# Patient Record
Sex: FEMALE | Race: WHITE | NOT HISPANIC OR LATINO | Employment: UNEMPLOYED | RURAL
[De-identification: names, ages, dates, MRNs, and addresses within clinical notes are randomized per-mention and may not be internally consistent; named-entity substitution may affect disease eponyms.]

---

## 2018-04-24 ENCOUNTER — HISTORICAL (OUTPATIENT)
Dept: ADMINISTRATIVE | Facility: HOSPITAL | Age: 48
End: 2018-04-24

## 2018-04-26 LAB
LAB AP GENERAL CAT - HISTORICAL: NORMAL
LAB AP INTERPRETATION/RESULT - HISTORICAL: NEGATIVE
LAB AP SPECIMEN ADEQUACY - HISTORICAL: NORMAL
LAB AP SPECIMEN SUBMITTED - HISTORICAL: NORMAL

## 2018-09-07 ENCOUNTER — HISTORICAL (OUTPATIENT)
Dept: ADMINISTRATIVE | Facility: HOSPITAL | Age: 48
End: 2018-09-07

## 2018-09-10 LAB
LAB AP CLINICAL INFORMATION: NORMAL
LAB AP COMMENTS: NORMAL
LAB AP DIAGNOSIS - HISTORICAL: NORMAL
LAB AP GROSS PATHOLOGY - HISTORICAL: NORMAL
LAB AP SPECIMEN SUBMITTED - HISTORICAL: NORMAL

## 2020-05-14 ENCOUNTER — HISTORICAL (OUTPATIENT)
Dept: ADMINISTRATIVE | Facility: HOSPITAL | Age: 50
End: 2020-05-14

## 2020-05-18 LAB
LAB AP COMMENTS: NORMAL
LAB AP GENERAL CAT - HISTORICAL: NORMAL
LAB AP INTERPRETATION/RESULT - HISTORICAL: NEGATIVE
LAB AP SPECIMEN ADEQUACY - HISTORICAL: NORMAL
LAB AP SPECIMEN SUBMITTED - HISTORICAL: NORMAL

## 2021-09-27 ENCOUNTER — HOSPITAL ENCOUNTER (OUTPATIENT)
Dept: RADIOLOGY | Facility: HOSPITAL | Age: 51
Discharge: HOME | End: 2021-09-27
Attending: ORTHOPAEDIC SURGERY
Payer: MEDICARE

## 2021-09-27 DIAGNOSIS — M25.562 PAIN IN BOTH KNEES, UNSPECIFIED CHRONICITY: ICD-10-CM

## 2021-09-27 DIAGNOSIS — M25.561 PAIN IN BOTH KNEES, UNSPECIFIED CHRONICITY: ICD-10-CM

## 2021-09-27 PROCEDURE — 73562 X-RAY EXAM OF KNEE 3: CPT | Mod: TC,50

## 2021-10-04 PROBLEM — M17.0 PRIMARY OSTEOARTHRITIS OF BOTH KNEES: Status: ACTIVE | Noted: 2021-10-04

## 2021-12-06 PROBLEM — M17.0 PRIMARY OSTEOARTHRITIS OF BOTH KNEES: Chronic | Status: ACTIVE | Noted: 2021-10-04

## 2021-12-06 RX ORDER — FLUCONAZOLE 150 MG/1
TABLET ORAL
Status: ON HOLD | COMMUNITY
Start: 2021-06-23 | End: 2021-12-07

## 2021-12-06 RX ORDER — DEXAMETHASONE 4 MG/1
TABLET ORAL
Status: ON HOLD | COMMUNITY
Start: 2021-07-02 | End: 2021-12-07

## 2021-12-06 RX ORDER — CLOTRIMAZOLE AND BETAMETHASONE DIPROPIONATE 10; .64 MG/G; MG/G
CREAM TOPICAL
COMMUNITY
Start: 2021-11-09

## 2021-12-06 RX ORDER — MUPIROCIN 20 MG/G
OINTMENT TOPICAL
Status: ON HOLD | COMMUNITY
Start: 2021-07-15 | End: 2021-12-07

## 2021-12-06 RX ORDER — IBUPROFEN 800 MG/1
800 TABLET ORAL EVERY 6 HOURS PRN
Status: ON HOLD | COMMUNITY
Start: 2021-11-30 | End: 2021-12-08 | Stop reason: HOSPADM

## 2021-12-06 RX ORDER — METRONIDAZOLE 500 MG/1
TABLET ORAL
Status: ON HOLD | COMMUNITY
Start: 2021-06-23 | End: 2021-12-07

## 2021-12-06 RX ORDER — CHOLECALCIFEROL (VITAMIN D3) 25 MCG
TABLET ORAL
Status: ON HOLD | COMMUNITY
Start: 2021-07-02 | End: 2021-12-07

## 2021-12-06 RX ORDER — ERGOCALCIFEROL 1.25 MG/1
50000 CAPSULE ORAL
COMMUNITY
Start: 2021-06-18

## 2021-12-06 RX ORDER — ESTRADIOL VALERATE 20 MG/ML
INJECTION INTRAMUSCULAR
COMMUNITY
Start: 2021-12-01

## 2021-12-06 RX ORDER — LANOLIN ALCOHOL/MO/W.PET/CERES
400 CREAM (GRAM) TOPICAL 2 TIMES DAILY
COMMUNITY
Start: 2021-12-02

## 2021-12-06 RX ORDER — PHENTERMINE HYDROCHLORIDE 37.5 MG/1
TABLET ORAL
COMMUNITY
Start: 2021-07-30

## 2021-12-06 RX ORDER — TRIAMCINOLONE ACETONIDE 5 MG/G
CREAM TOPICAL
Status: ON HOLD | COMMUNITY
Start: 2021-11-30 | End: 2021-12-07

## 2021-12-06 RX ORDER — DIPHENOXYLATE HYDROCHLORIDE AND ATROPINE SULFATE 2.5; .025 MG/1; MG/1
TABLET ORAL
Status: ON HOLD | COMMUNITY
Start: 2021-06-23 | End: 2021-12-07

## 2021-12-06 RX ORDER — AZITHROMYCIN 250 MG/1
TABLET, FILM COATED ORAL
Status: ON HOLD | COMMUNITY
Start: 2021-07-15 | End: 2021-12-07

## 2021-12-06 NOTE — PRE ADMISSION SCREENING
Spoke with patient who stated she no longer takes Xarelto.  States she had [facility].

## 2021-12-07 ENCOUNTER — CLINICAL SUPPORT (OUTPATIENT)
Dept: CARDIOLOGY | Facility: CLINIC | Age: 51
End: 2021-12-07
Payer: MEDICARE

## 2021-12-07 ENCOUNTER — HOSPITAL ENCOUNTER (OUTPATIENT)
Facility: HOSPITAL | Age: 51
Discharge: NURSING, REHAB OR LONG-TERM CARE | End: 2021-12-08
Attending: ORTHOPAEDIC SURGERY | Admitting: ORTHOPAEDIC SURGERY
Payer: MEDICARE

## 2021-12-07 ENCOUNTER — ANESTHESIA EVENT (OUTPATIENT)
Dept: SURGERY | Facility: HOSPITAL | Age: 51
End: 2021-12-07
Payer: MEDICARE

## 2021-12-07 ENCOUNTER — ANESTHESIA (OUTPATIENT)
Dept: SURGERY | Facility: HOSPITAL | Age: 51
End: 2021-12-07
Payer: MEDICARE

## 2021-12-07 DIAGNOSIS — I10 HYPERTENSION: ICD-10-CM

## 2021-12-07 DIAGNOSIS — M17.0 PRIMARY OSTEOARTHRITIS OF BOTH KNEES: ICD-10-CM

## 2021-12-07 DIAGNOSIS — Z01.810 PREOP CARDIOVASCULAR EXAM: ICD-10-CM

## 2021-12-07 DIAGNOSIS — M17.12 PRIMARY OSTEOARTHRITIS OF LEFT KNEE: Primary | ICD-10-CM

## 2021-12-07 PROBLEM — E66.01 SEVERE OBESITY (BMI >= 40): Chronic | Status: ACTIVE | Noted: 2021-12-07

## 2021-12-07 PROBLEM — Z96.652 S/P TOTAL KNEE ARTHROPLASTY, LEFT: Status: ACTIVE | Noted: 2021-12-07

## 2021-12-07 PROBLEM — G47.33 OSA (OBSTRUCTIVE SLEEP APNEA): Chronic | Status: ACTIVE | Noted: 2021-12-07

## 2021-12-07 PROBLEM — G40.909 EPILEPSY: Chronic | Status: ACTIVE | Noted: 2021-12-07

## 2021-12-07 LAB
ANION GAP SERPL CALCULATED.3IONS-SCNC: 13 MMOL/L (ref 7–16)
APTT PPP: 27.5 SECONDS (ref 25.2–37.3)
BASOPHILS # BLD AUTO: 0.05 K/UL (ref 0–0.2)
BASOPHILS NFR BLD AUTO: 0.3 % (ref 0–1)
BUN SERPL-MCNC: 9 MG/DL (ref 7–18)
BUN/CREAT SERPL: 14 (ref 6–20)
CALCIUM SERPL-MCNC: 8.4 MG/DL (ref 8.5–10.1)
CHLORIDE SERPL-SCNC: 107 MMOL/L (ref 98–107)
CO2 SERPL-SCNC: 23 MMOL/L (ref 21–32)
CREAT SERPL-MCNC: 0.66 MG/DL (ref 0.55–1.02)
DIFFERENTIAL METHOD BLD: ABNORMAL
EOSINOPHIL # BLD AUTO: 0.01 K/UL (ref 0–0.5)
EOSINOPHIL NFR BLD AUTO: 0.1 % (ref 1–4)
ERYTHROCYTE [DISTWIDTH] IN BLOOD BY AUTOMATED COUNT: 12.2 % (ref 11.5–14.5)
GLUCOSE SERPL-MCNC: 128 MG/DL (ref 74–106)
HCT VFR BLD AUTO: 34.6 % (ref 38–47)
HGB BLD-MCNC: 11.9 G/DL (ref 12–16)
IMM GRANULOCYTES # BLD AUTO: 0.1 K/UL (ref 0–0.04)
IMM GRANULOCYTES NFR BLD: 0.6 % (ref 0–0.4)
INDIRECT COOMBS: NORMAL
LYMPHOCYTES # BLD AUTO: 1.17 K/UL (ref 1–4.8)
LYMPHOCYTES NFR BLD AUTO: 7 % (ref 27–41)
MCH RBC QN AUTO: 30.6 PG (ref 27–31)
MCHC RBC AUTO-ENTMCNC: 34.4 G/DL (ref 32–36)
MCV RBC AUTO: 88.9 FL (ref 80–96)
MONOCYTES # BLD AUTO: 0.42 K/UL (ref 0–0.8)
MONOCYTES NFR BLD AUTO: 2.5 % (ref 2–6)
MPC BLD CALC-MCNC: 10.1 FL (ref 9.4–12.4)
NEUTROPHILS # BLD AUTO: 14.93 K/UL (ref 1.8–7.7)
NEUTROPHILS NFR BLD AUTO: 89.5 % (ref 53–65)
NRBC # BLD AUTO: 0 X10E3/UL
NRBC, AUTO (.00): 0 %
PLATELET # BLD AUTO: 279 K/UL (ref 150–400)
POTASSIUM SERPL-SCNC: 4.4 MMOL/L (ref 3.5–5.1)
RBC # BLD AUTO: 3.89 M/UL (ref 4.2–5.4)
RH BLD: NORMAL
SODIUM SERPL-SCNC: 139 MMOL/L (ref 136–145)
WBC # BLD AUTO: 16.68 K/UL (ref 4.5–11)

## 2021-12-07 PROCEDURE — 85025 COMPLETE CBC W/AUTO DIFF WBC: CPT | Performed by: ORTHOPAEDIC SURGERY

## 2021-12-07 PROCEDURE — 64447 PERIPHERAL BLOCK: ICD-10-PCS | Mod: XU,LT,, | Performed by: ANESTHESIOLOGY

## 2021-12-07 PROCEDURE — 36000713 HC OR TIME LEV V EA ADD 15 MIN: Performed by: ORTHOPAEDIC SURGERY

## 2021-12-07 PROCEDURE — 80048 BASIC METABOLIC PNL TOTAL CA: CPT | Performed by: ORTHOPAEDIC SURGERY

## 2021-12-07 PROCEDURE — 71000039 HC RECOVERY, EACH ADD'L HOUR: Performed by: ORTHOPAEDIC SURGERY

## 2021-12-07 PROCEDURE — D9220A PRA ANESTHESIA: ICD-10-PCS | Mod: ANES,ICN,, | Performed by: ANESTHESIOLOGY

## 2021-12-07 PROCEDURE — 27200750 HC INSULATED NEEDLE/ STIMUPLEX: Performed by: ANESTHESIOLOGY

## 2021-12-07 PROCEDURE — 27000177 HC AIRWAY, LARYNGEAL MASK: Performed by: ANESTHESIOLOGY

## 2021-12-07 PROCEDURE — 63600175 PHARM REV CODE 636 W HCPCS: Performed by: ANESTHESIOLOGY

## 2021-12-07 PROCEDURE — 25000003 PHARM REV CODE 250: Performed by: ANESTHESIOLOGY

## 2021-12-07 PROCEDURE — 25000003 PHARM REV CODE 250: Performed by: NURSE ANESTHETIST, CERTIFIED REGISTERED

## 2021-12-07 PROCEDURE — 27000733: Performed by: ANESTHESIOLOGY

## 2021-12-07 PROCEDURE — 63600175 PHARM REV CODE 636 W HCPCS

## 2021-12-07 PROCEDURE — 27000655: Performed by: ANESTHESIOLOGY

## 2021-12-07 PROCEDURE — 93010 ELECTROCARDIOGRAM REPORT: CPT | Mod: S$PBB,,, | Performed by: INTERNAL MEDICINE

## 2021-12-07 PROCEDURE — 94761 N-INVAS EAR/PLS OXIMETRY MLT: CPT

## 2021-12-07 PROCEDURE — G0378 HOSPITAL OBSERVATION PER HR: HCPCS

## 2021-12-07 PROCEDURE — 71000016 HC POSTOP RECOV ADDL HR: Performed by: ORTHOPAEDIC SURGERY

## 2021-12-07 PROCEDURE — 63600175 PHARM REV CODE 636 W HCPCS: Performed by: ORTHOPAEDIC SURGERY

## 2021-12-07 PROCEDURE — 25000003 PHARM REV CODE 250: Performed by: ORTHOPAEDIC SURGERY

## 2021-12-07 PROCEDURE — C1776 JOINT DEVICE (IMPLANTABLE): HCPCS | Performed by: ORTHOPAEDIC SURGERY

## 2021-12-07 PROCEDURE — 97161 PT EVAL LOW COMPLEX 20 MIN: CPT

## 2021-12-07 PROCEDURE — 86901 BLOOD TYPING SEROLOGIC RH(D): CPT | Performed by: ORTHOPAEDIC SURGERY

## 2021-12-07 PROCEDURE — 37000009 HC ANESTHESIA EA ADD 15 MINS: Performed by: ORTHOPAEDIC SURGERY

## 2021-12-07 PROCEDURE — 11000001 HC ACUTE MED/SURG PRIVATE ROOM

## 2021-12-07 PROCEDURE — 71000015 HC POSTOP RECOV 1ST HR: Performed by: ORTHOPAEDIC SURGERY

## 2021-12-07 PROCEDURE — 27000221 HC OXYGEN, UP TO 24 HOURS

## 2021-12-07 PROCEDURE — 71000033 HC RECOVERY, INTIAL HOUR: Performed by: ORTHOPAEDIC SURGERY

## 2021-12-07 PROCEDURE — 63600175 PHARM REV CODE 636 W HCPCS: Performed by: NURSE ANESTHETIST, CERTIFIED REGISTERED

## 2021-12-07 PROCEDURE — 27000260 *HC AIRWAY ORAL: Performed by: ANESTHESIOLOGY

## 2021-12-07 PROCEDURE — 37000008 HC ANESTHESIA 1ST 15 MINUTES: Performed by: ORTHOPAEDIC SURGERY

## 2021-12-07 PROCEDURE — D9220A PRA ANESTHESIA: Mod: ANES,ICN,, | Performed by: ANESTHESIOLOGY

## 2021-12-07 PROCEDURE — C1713 ANCHOR/SCREW BN/BN,TIS/BN: HCPCS | Performed by: ORTHOPAEDIC SURGERY

## 2021-12-07 PROCEDURE — 85730 THROMBOPLASTIN TIME PARTIAL: CPT | Performed by: ORTHOPAEDIC SURGERY

## 2021-12-07 PROCEDURE — 25000003 PHARM REV CODE 250

## 2021-12-07 PROCEDURE — 97166 OT EVAL MOD COMPLEX 45 MIN: CPT

## 2021-12-07 PROCEDURE — 64447 NJX AA&/STRD FEMORAL NRV IMG: CPT | Mod: XU,LT,, | Performed by: ANESTHESIOLOGY

## 2021-12-07 PROCEDURE — 36000712 HC OR TIME LEV V 1ST 15 MIN: Performed by: ORTHOPAEDIC SURGERY

## 2021-12-07 PROCEDURE — C1769 GUIDE WIRE: HCPCS | Performed by: ORTHOPAEDIC SURGERY

## 2021-12-07 PROCEDURE — 93005 ELECTROCARDIOGRAM TRACING: CPT | Mod: PBBFAC | Performed by: INTERNAL MEDICINE

## 2021-12-07 PROCEDURE — 36415 COLL VENOUS BLD VENIPUNCTURE: CPT | Performed by: ORTHOPAEDIC SURGERY

## 2021-12-07 PROCEDURE — 99212 OFFICE O/P EST SF 10 MIN: CPT | Mod: PBBFAC

## 2021-12-07 PROCEDURE — 27000716 HC OXISENSOR PROBE, ANY SIZE: Performed by: ANESTHESIOLOGY

## 2021-12-07 PROCEDURE — D9220A PRA ANESTHESIA: Mod: CRNA,,,

## 2021-12-07 PROCEDURE — 27201423 OPTIME MED/SURG SUP & DEVICES STERILE SUPPLY: Performed by: ORTHOPAEDIC SURGERY

## 2021-12-07 PROCEDURE — 27100168 OPTIME MED/SURG SUP & DEVICES NON-STERILE SUPPLY: Performed by: ORTHOPAEDIC SURGERY

## 2021-12-07 PROCEDURE — D9220A PRA ANESTHESIA: ICD-10-PCS | Mod: CRNA,,,

## 2021-12-07 PROCEDURE — 99900031 HC PATIENT EDUCATION (STAT)

## 2021-12-07 PROCEDURE — 93010 EKG 12-LEAD: ICD-10-PCS | Mod: S$PBB,,, | Performed by: INTERNAL MEDICINE

## 2021-12-07 PROCEDURE — 99900035 HC TECH TIME PER 15 MIN (STAT)

## 2021-12-07 DEVICE — IMP COMP TIB TRIATHLON SZ4: Type: IMPLANTABLE DEVICE | Site: KNEE | Status: FUNCTIONAL

## 2021-12-07 DEVICE — IMP PATELLA ASYMMETRIC A32 10MM: Type: IMPLANTABLE DEVICE | Site: KNEE | Status: FUNCTIONAL

## 2021-12-07 DEVICE — IMPLANTABLE DEVICE: Type: IMPLANTABLE DEVICE | Site: KNEE | Status: FUNCTIONAL

## 2021-12-07 DEVICE — CEMENT BONE SMARTSET HV 40G: Type: IMPLANTABLE DEVICE | Site: KNEE | Status: FUNCTIONAL

## 2021-12-07 RX ORDER — PROPOFOL 10 MG/ML
VIAL (ML) INTRAVENOUS
Status: DISCONTINUED | OUTPATIENT
Start: 2021-12-07 | End: 2021-12-07

## 2021-12-07 RX ORDER — ONDANSETRON 2 MG/ML
4 INJECTION INTRAMUSCULAR; INTRAVENOUS DAILY PRN
Status: DISCONTINUED | OUTPATIENT
Start: 2021-12-07 | End: 2021-12-07 | Stop reason: HOSPADM

## 2021-12-07 RX ORDER — BISACODYL 10 MG
10 SUPPOSITORY, RECTAL RECTAL DAILY PRN
Status: DISCONTINUED | OUTPATIENT
Start: 2021-12-07 | End: 2021-12-08 | Stop reason: HOSPADM

## 2021-12-07 RX ORDER — MORPHINE SULFATE 4 MG/ML
INJECTION, SOLUTION INTRAMUSCULAR; INTRAVENOUS
Status: DISPENSED
Start: 2021-12-07 | End: 2021-12-08

## 2021-12-07 RX ORDER — CARBAMAZEPINE 100 MG/1
200 TABLET, EXTENDED RELEASE ORAL NIGHTLY
COMMUNITY
End: 2023-08-07

## 2021-12-07 RX ORDER — HYDROCODONE BITARTRATE AND ACETAMINOPHEN 10; 325 MG/1; MG/1
1 TABLET ORAL EVERY 4 HOURS PRN
Status: DISCONTINUED | OUTPATIENT
Start: 2021-12-07 | End: 2021-12-08 | Stop reason: HOSPADM

## 2021-12-07 RX ORDER — LIDOCAINE HYDROCHLORIDE 20 MG/ML
INJECTION INTRAVENOUS
Status: DISCONTINUED | OUTPATIENT
Start: 2021-12-07 | End: 2021-12-07

## 2021-12-07 RX ORDER — CEFAZOLIN SODIUM 1 G/3ML
INJECTION, POWDER, FOR SOLUTION INTRAMUSCULAR; INTRAVENOUS
Status: DISCONTINUED | OUTPATIENT
Start: 2021-12-07 | End: 2021-12-07

## 2021-12-07 RX ORDER — MORPHINE SULFATE 4 MG/ML
4 INJECTION, SOLUTION INTRAMUSCULAR; INTRAVENOUS EVERY 4 HOURS PRN
Status: DISCONTINUED | OUTPATIENT
Start: 2021-12-07 | End: 2021-12-08 | Stop reason: HOSPADM

## 2021-12-07 RX ORDER — PHENYLEPHRINE HYDROCHLORIDE 10 MG/ML
INJECTION INTRAVENOUS
Status: DISCONTINUED | OUTPATIENT
Start: 2021-12-07 | End: 2021-12-07

## 2021-12-07 RX ORDER — DEXAMETHASONE SODIUM PHOSPHATE 4 MG/ML
INJECTION, SOLUTION INTRA-ARTICULAR; INTRALESIONAL; INTRAMUSCULAR; INTRAVENOUS; SOFT TISSUE
Status: DISCONTINUED | OUTPATIENT
Start: 2021-12-07 | End: 2021-12-07

## 2021-12-07 RX ORDER — FENTANYL CITRATE 50 UG/ML
INJECTION, SOLUTION INTRAMUSCULAR; INTRAVENOUS
Status: DISCONTINUED | OUTPATIENT
Start: 2021-12-07 | End: 2021-12-07

## 2021-12-07 RX ORDER — CEFAZOLIN SODIUM 2 G/50ML
2 SOLUTION INTRAVENOUS
Status: COMPLETED | OUTPATIENT
Start: 2021-12-07 | End: 2021-12-08

## 2021-12-07 RX ORDER — TRANEXAMIC ACID 100 MG/ML
INJECTION, SOLUTION INTRAVENOUS
Status: DISCONTINUED | OUTPATIENT
Start: 2021-12-07 | End: 2021-12-07

## 2021-12-07 RX ORDER — ZAFIRLUKAST 20 MG/1
20 TABLET, FILM COATED ORAL 2 TIMES DAILY
COMMUNITY
End: 2023-08-07

## 2021-12-07 RX ORDER — ESCITALOPRAM OXALATE 10 MG/1
20 TABLET ORAL DAILY
Status: DISCONTINUED | OUTPATIENT
Start: 2021-12-08 | End: 2021-12-08

## 2021-12-07 RX ORDER — SODIUM CHLORIDE, SODIUM LACTATE, POTASSIUM CHLORIDE, CALCIUM CHLORIDE 600; 310; 30; 20 MG/100ML; MG/100ML; MG/100ML; MG/100ML
125 INJECTION, SOLUTION INTRAVENOUS CONTINUOUS
Status: DISCONTINUED | OUTPATIENT
Start: 2021-12-07 | End: 2021-12-08 | Stop reason: SDUPTHER

## 2021-12-07 RX ORDER — LIDOCAINE HYDROCHLORIDE 10 MG/ML
1 INJECTION, SOLUTION EPIDURAL; INFILTRATION; INTRACAUDAL; PERINEURAL ONCE
Status: DISCONTINUED | OUTPATIENT
Start: 2021-12-07 | End: 2021-12-07

## 2021-12-07 RX ORDER — KETOROLAC TROMETHAMINE 30 MG/ML
INJECTION, SOLUTION INTRAMUSCULAR; INTRAVENOUS
Status: DISCONTINUED | OUTPATIENT
Start: 2021-12-07 | End: 2021-12-07

## 2021-12-07 RX ORDER — SODIUM CHLORIDE, SODIUM LACTATE, POTASSIUM CHLORIDE, CALCIUM CHLORIDE 600; 310; 30; 20 MG/100ML; MG/100ML; MG/100ML; MG/100ML
INJECTION, SOLUTION INTRAVENOUS CONTINUOUS
Status: DISCONTINUED | OUTPATIENT
Start: 2021-12-07 | End: 2021-12-07

## 2021-12-07 RX ORDER — HYDROMORPHONE HYDROCHLORIDE 2 MG/ML
0.5 INJECTION, SOLUTION INTRAMUSCULAR; INTRAVENOUS; SUBCUTANEOUS EVERY 5 MIN PRN
Status: COMPLETED | OUTPATIENT
Start: 2021-12-07 | End: 2021-12-07

## 2021-12-07 RX ORDER — ONDANSETRON 2 MG/ML
INJECTION INTRAMUSCULAR; INTRAVENOUS
Status: DISCONTINUED | OUTPATIENT
Start: 2021-12-07 | End: 2021-12-07

## 2021-12-07 RX ORDER — OXYCODONE HYDROCHLORIDE 5 MG/1
5 TABLET ORAL
Status: DISCONTINUED | OUTPATIENT
Start: 2021-12-07 | End: 2021-12-07 | Stop reason: HOSPADM

## 2021-12-07 RX ORDER — DIPHENHYDRAMINE HYDROCHLORIDE 50 MG/ML
25 INJECTION INTRAMUSCULAR; INTRAVENOUS EVERY 6 HOURS PRN
Status: DISCONTINUED | OUTPATIENT
Start: 2021-12-07 | End: 2021-12-07 | Stop reason: HOSPADM

## 2021-12-07 RX ORDER — ROPIVACAINE HYDROCHLORIDE 7.5 MG/ML
INJECTION, SOLUTION EPIDURAL; PERINEURAL
Status: COMPLETED | OUTPATIENT
Start: 2021-12-07 | End: 2021-12-07

## 2021-12-07 RX ORDER — SODIUM CHLORIDE, SODIUM LACTATE, POTASSIUM CHLORIDE, CALCIUM CHLORIDE 600; 310; 30; 20 MG/100ML; MG/100ML; MG/100ML; MG/100ML
INJECTION, SOLUTION INTRAVENOUS CONTINUOUS
Status: DISCONTINUED | OUTPATIENT
Start: 2021-12-07 | End: 2021-12-08 | Stop reason: HOSPADM

## 2021-12-07 RX ORDER — MORPHINE SULFATE 10 MG/ML
4 INJECTION INTRAMUSCULAR; INTRAVENOUS; SUBCUTANEOUS EVERY 5 MIN PRN
Status: DISCONTINUED | OUTPATIENT
Start: 2021-12-07 | End: 2021-12-07 | Stop reason: HOSPADM

## 2021-12-07 RX ORDER — SODIUM CHLORIDE, SODIUM LACTATE, POTASSIUM CHLORIDE, CALCIUM CHLORIDE 600; 310; 30; 20 MG/100ML; MG/100ML; MG/100ML; MG/100ML
INJECTION, SOLUTION INTRAVENOUS CONTINUOUS
Status: DISCONTINUED | OUTPATIENT
Start: 2021-12-08 | End: 2021-12-07

## 2021-12-07 RX ORDER — BUPIVACAINE HYDROCHLORIDE 5 MG/ML
INJECTION, SOLUTION EPIDURAL; INTRACAUDAL
Status: COMPLETED | OUTPATIENT
Start: 2021-12-07 | End: 2021-12-07

## 2021-12-07 RX ORDER — ONDANSETRON 2 MG/ML
4 INJECTION INTRAMUSCULAR; INTRAVENOUS EVERY 8 HOURS PRN
Status: DISCONTINUED | OUTPATIENT
Start: 2021-12-07 | End: 2021-12-08 | Stop reason: HOSPADM

## 2021-12-07 RX ORDER — CARBAMAZEPINE 100 MG/1
100 TABLET, EXTENDED RELEASE ORAL DAILY
COMMUNITY
End: 2023-07-26

## 2021-12-07 RX ORDER — MORPHINE SULFATE 10 MG/ML
4 INJECTION INTRAMUSCULAR; INTRAVENOUS; SUBCUTANEOUS EVERY 4 HOURS PRN
Status: DISCONTINUED | OUTPATIENT
Start: 2021-12-07 | End: 2021-12-07

## 2021-12-07 RX ADMIN — PHENYLEPHRINE HYDROCHLORIDE 100 MCG: 10 INJECTION INTRAVENOUS at 01:12

## 2021-12-07 RX ADMIN — ONDANSETRON 8 MG: 2 INJECTION INTRAMUSCULAR; INTRAVENOUS at 01:12

## 2021-12-07 RX ADMIN — PROPOFOL 100 MG: 10 INJECTION, EMULSION INTRAVENOUS at 12:12

## 2021-12-07 RX ADMIN — PROPOFOL 175 MG: 10 INJECTION, EMULSION INTRAVENOUS at 12:12

## 2021-12-07 RX ADMIN — PHENYLEPHRINE HYDROCHLORIDE 15 MCG/MIN: 10 INJECTION INTRAVENOUS at 01:12

## 2021-12-07 RX ADMIN — TRANEXAMIC ACID 1000 MG: 100 INJECTION, SOLUTION INTRAVENOUS at 02:12

## 2021-12-07 RX ADMIN — VANCOMYCIN HYDROCHLORIDE 1500 MG: 1 INJECTION, POWDER, LYOPHILIZED, FOR SOLUTION INTRAVENOUS at 12:12

## 2021-12-07 RX ADMIN — PROMETHAZINE HYDROCHLORIDE 25 MG: 25 INJECTION INTRAMUSCULAR; INTRAVENOUS at 03:12

## 2021-12-07 RX ADMIN — LIDOCAINE HYDROCHLORIDE 50 MG: 20 INJECTION, SOLUTION INTRAVENOUS at 12:12

## 2021-12-07 RX ADMIN — HYDROMORPHONE HYDROCHLORIDE 0.5 MG: 2 INJECTION INTRAMUSCULAR; INTRAVENOUS; SUBCUTANEOUS at 03:12

## 2021-12-07 RX ADMIN — ROPIVACAINE HYDROCHLORIDE 15 ML: 7.5 INJECTION, SOLUTION EPIDURAL; PERINEURAL at 12:12

## 2021-12-07 RX ADMIN — HYDROCODONE BITARTRATE AND ACETAMINOPHEN 1 TABLET: 10; 325 TABLET ORAL at 08:12

## 2021-12-07 RX ADMIN — SODIUM CHLORIDE, POTASSIUM CHLORIDE, SODIUM LACTATE AND CALCIUM CHLORIDE: 600; 310; 30; 20 INJECTION, SOLUTION INTRAVENOUS at 08:12

## 2021-12-07 RX ADMIN — DEXAMETHASONE SODIUM PHOSPHATE 8 MG: 4 INJECTION, SOLUTION INTRA-ARTICULAR; INTRALESIONAL; INTRAMUSCULAR; INTRAVENOUS; SOFT TISSUE at 12:12

## 2021-12-07 RX ADMIN — KETOROLAC TROMETHAMINE 60 MG: 30 INJECTION, SOLUTION INTRAMUSCULAR at 12:12

## 2021-12-07 RX ADMIN — FENTANYL CITRATE 100 MCG: 50 INJECTION INTRAMUSCULAR; INTRAVENOUS at 12:12

## 2021-12-07 RX ADMIN — MORPHINE SULFATE 4 MG: 10 INJECTION INTRAVENOUS at 06:12

## 2021-12-07 RX ADMIN — BUPIVACAINE HYDROCHLORIDE 1.25 ML: 5 INJECTION, SOLUTION EPIDURAL; INTRACAUDAL; PERINEURAL at 12:12

## 2021-12-07 RX ADMIN — CEFAZOLIN 2 G: 10 INJECTION, POWDER, FOR SOLUTION INTRAVENOUS at 10:12

## 2021-12-07 RX ADMIN — TRANEXAMIC ACID 1000 MG: 100 INJECTION, SOLUTION INTRAVENOUS at 12:12

## 2021-12-07 RX ADMIN — MORPHINE SULFATE 4 MG: 4 INJECTION INTRAVENOUS at 10:12

## 2021-12-07 RX ADMIN — SODIUM CHLORIDE, POTASSIUM CHLORIDE, SODIUM LACTATE AND CALCIUM CHLORIDE: 600; 310; 30; 20 INJECTION, SOLUTION INTRAVENOUS at 12:12

## 2021-12-07 RX ADMIN — CEFAZOLIN 2 G: 1 INJECTION, POWDER, FOR SOLUTION INTRAMUSCULAR; INTRAVENOUS; PARENTERAL at 12:12

## 2021-12-07 RX ADMIN — SODIUM CHLORIDE, POTASSIUM CHLORIDE, SODIUM LACTATE AND CALCIUM CHLORIDE: 600; 310; 30; 20 INJECTION, SOLUTION INTRAVENOUS at 10:12

## 2021-12-07 NOTE — PROGRESS NOTES
RELEASE TO ASC RN AWAKE, ALERT WITHOUT DISTRESS NOTED. RN AWARE TO NOT GIVE ANY NARCOTICS PER ORDERS DR. WHITE. SISTER AT BEDSIDE.

## 2021-12-07 NOTE — H&P
[facility] - Orthopedic Periop Services  Orthopedics  H&P    Patient Name: Marian Foster  MRN: [id number]  Admission Date: (Not on file)  Primary Care Provider: Primary Doctor No    Patient information was obtained from patient and ER records.     Subjective:     Principal Problem:Primary osteoarthritis of both knees    Chief Complaint: No chief complaint on file.       HPI: Chief complaint:  Left knee pain  History:  Marian Foster is a 51 y.o. female seen for evaluation of bilateral knee pain.  Left knee is more symptomatic than the right.  Symptoms began insidiously 10 years or more in a been shoulder progressive over time.  She reported has been diagnosed with psoriatic arthritis.  She sees rheumatologist Dr. Masters in [place].  She has had periodic intra-articular steroid injections as well as viscosupplementation injections.  She last had corticosteroid injections both knees 08/19/2021.  She now only deriving 2-3 days improvement in symptoms.  Her pain is worse with weight-bearing.  She has component of night pain.  Symptoms disrupt her desired activities of daily living.  She is here today requesting consideration of total knee replacement arthroplasty on the left side.  She sees Dr. Sutton for her routine medical needs.  She has never had an MI or CVA.  She does have history of sleep apnea and uses a CPAP and oxygen at home.  She is not on the anticoagulation therapy.  She experiences pain over both the medial and lateral aspect of both knees.  X-rays left knee three views:  Bones well mineralized.  There is moderate severe narrowing of medial and lateral joint spaces with marginal osteophyte formation and tricompartmental distribution.  There is no evidence of acute fracture dislocation or pathologic bone.  X-rays right knee three views:  The bones well mineralized.  There is moderately severe narrowing of medial and lateral joint spaces.  Heart marginal osteophyte formation is present in a tricompartmental  distribution.  No evidence of acute fracture dislocation or pathologic bone.  Impression:  Severe DJD both knees  Plan:  Patient wished to proceed with a computer navigated left total knee replacement arthroplasty.  Procedure was discussed in detail using models and actual components.  The potential benefits and risks of surgery outlined to include but not limited to bleeding, infection, damage to blood vessels and nerves, need for further surgery, other risks and complications including even death patient wished proceed.      Past Medical History:   Diagnosis Date    Arthritis     Disorder of kidney and ureter     Hypertension     Liver disease     Osteoarthritis        Past Surgical History:   Procedure Laterality Date    ABDOMINAL SURGERY      KNEE ARTHROSCOPY      TONSILLECTOMY         Review of patient's allergies indicates:   Allergen Reactions    Adhesive tape-silicones     Demerol [meperidine]     Erythromycin     Prozac [fluoxetine]     Tetracyclines        No current facility-administered medications for this encounter.     Current Outpatient Medications   Medication Sig    azithromycin (Z-SHORTY) 250 MG tablet     carBAMazepine (TEGRETOL XR) 100 MG 12 hr tablet     clotrimazole-betamethasone 1-0.05% (LOTRISONE) cream     dexAMETHasone (DECADRON) 4 MG Tab     diphenoxylate-atropine 2.5-0.025 mg (LOMOTIL) 2.5-0.025 mg per tablet     ergocalciferol (ERGOCALCIFEROL) 50,000 unit Cap     EScitalopram oxalate (LEXAPRO) 20 MG tablet     estradiol valerate (DELESTROGEN) 20 mg/mL injection     febuxostat (ULORIC) 40 mg Tab     fluconazole (DIFLUCAN) 150 MG Tab     hydrOXYchloroQUINE (PLAQUENIL) 200 mg tablet     ibuprofen (ADVIL,MOTRIN) 800 MG tablet     magnesium oxide (MAG-OX) 400 mg (241.3 mg magnesium) tablet     metroNIDAZOLE (FLAGYL) 500 MG tablet     mupirocin (BACTROBAN) 2 % ointment     ORENCIA CLICKJECT 125 mg/mL AtIn     oxyCODONE-acetaminophen (PERCOCET)  mg per tablet   "   phentermine (ADIPEX-P) 37.5 mg tablet     potassium chloride SA (K-DUR,KLOR-CON) 20 MEQ tablet     tiZANidine (ZANAFLEX) 4 MG tablet     triamcinolone acetonide 0.5% (KENALOG) 0.5 % Crea     VITAMIN D3 25 mcg (1,000 unit) tablet     XARELTO 10 mg Tab Patient states is no longer taking Xarelto     Family History    None       Tobacco Use    Smoking status: Never Smoker    Smokeless tobacco: Never Used   Substance and Sexual Activity    Alcohol use: Not Currently    Drug use: Not Currently    Sexual activity: Not on file     Review of Systems   Constitutional: Negative.     Objective:     Vital Signs (Most Recent):    Vital Signs (24h Range):        Weight: (!) 148.8 kg (328 lb)  Height: 5' 6" (167.6 cm)  Body mass index is 52.94 kg/m².    No intake or output data in the 24 hours ending 12/06/21 2985    General    Constitutional: She is oriented to person, place, and time. She appears well-developed and well-nourished.   HENT:   Head: Normocephalic and atraumatic.   Eyes: EOM are normal. Pupils are equal, round, and reactive to light.   Neck: Neck supple.   Cardiovascular: Normal rate and regular rhythm.    Pulmonary/Chest: Effort normal and breath sounds normal.   Abdominal: Soft. Bowel sounds are normal.   Neurological: She is alert and oriented to person, place, and time.   Psychiatric: She has a normal mood and affect. Her behavior is normal.     General Musculoskeletal Exam   Gait: antalgic and abnormal       Right Knee Exam     Inspection   Effusion: present    Tenderness   The patient is tender to palpation of the medial joint line.    Crepitus   The patient has crepitus of the patella and medial joint line.    Range of Motion   Extension: abnormal   Flexion: abnormal     Other   Sensation: normal    Left Knee Exam     Inspection   Swelling: present  Effusion: present    Tenderness   The patient tender to palpation of the medial joint line.    Crepitus   The patient has crepitus of the patella and " medial joint line.    Range of Motion   Extension: abnormal   Flexion: abnormal     Tests   Stability Lachman: normal (-1 to 2mm) PCL-Posterior Drawer: normal (0 to 2mm)  MCL - Valgus: normal (0 to 2mm)  LCL - Varus: normal (0 to 2mm)Pivot Shift: normal (Equal)Reverse Pivot Shift: normal (Equal)    Other   Sensation: normal    Muscle Strength   Left Lower Extremity   Hip Abduction: 5/5   Quadriceps:  5/5   Hamstrin/5     Vascular Exam     Right Pulses  Dorsalis Pedis:      2+  Posterior Tibial:      2+        Left Pulses  Dorsalis Pedis:      2+  Posterior Tibial:      2+            Significant Labs: All pertinent labs within the past 24 hours have been reviewed.    Significant Imaging: I have reviewed all pertinent imaging results/findings.    Assessment/Plan:     No notes have been filed under this hospital service.  Service: Orthopedic Surgery      Gaurang Toussaint MD  Orthopedics  [facility] - Orthopedic Periop Services

## 2021-12-07 NOTE — OR NURSING
STILL C/O NAUSEA AND PAIN. SEE FLOW SHEET. ENCOURAGED COUGH DEEP BREATHS. O2 VIA NC.    1600 O2 VIA NC. STILL C/O PAIN. ENCOURAGED COUGH DEEP BREATHS. NO FURTHER C/O NAUSEA. DESATS INTO THE MID 80'S AT INTERVALS. CO2 50'S. DR. WHITE MADE AWARE.

## 2021-12-07 NOTE — PROGRESS NOTES
REC'ED TO RR EASILY AROUSED. ORAL AIRWAY REMOVED. O2 VIA FM. ENCOURAGED DEEP BREATHS. HOB ELEVATED. C/O PAIN AT OP-SITE AND NAUSEA. DRESSING LEFT KNEE D/I. ICE WRAP AND IMMOBILIZER LEFT KNEE. HEMOVAC DRAIN LEFT KNEE COMPRESSED. P/D SCANT RED COLORED DRAINAGE. LEFT PEDAL PULSE +, TOES PINK. MOBILE. IV INFUSING WELL LEFT WRIST 20G. CATH. SEE FLOW SHEET. POSS 2.

## 2021-12-07 NOTE — SUBJECTIVE & OBJECTIVE
Past Medical History:   Diagnosis Date    Arthritis     Disorder of kidney and ureter     Hypertension     Liver disease     Osteoarthritis        Past Surgical History:   Procedure Laterality Date    ABDOMINAL SURGERY      KNEE ARTHROSCOPY      TONSILLECTOMY         Review of patient's allergies indicates:   Allergen Reactions    Adhesive tape-silicones     Demerol [meperidine]     Erythromycin     Prozac [fluoxetine]     Tetracyclines        No current facility-administered medications for this encounter.     Current Outpatient Medications   Medication Sig    azithromycin (Z-SHORTY) 250 MG tablet     carBAMazepine (TEGRETOL XR) 100 MG 12 hr tablet     clotrimazole-betamethasone 1-0.05% (LOTRISONE) cream     dexAMETHasone (DECADRON) 4 MG Tab     diphenoxylate-atropine 2.5-0.025 mg (LOMOTIL) 2.5-0.025 mg per tablet     ergocalciferol (ERGOCALCIFEROL) 50,000 unit Cap     EScitalopram oxalate (LEXAPRO) 20 MG tablet     estradiol valerate (DELESTROGEN) 20 mg/mL injection     febuxostat (ULORIC) 40 mg Tab     fluconazole (DIFLUCAN) 150 MG Tab     hydrOXYchloroQUINE (PLAQUENIL) 200 mg tablet     ibuprofen (ADVIL,MOTRIN) 800 MG tablet     magnesium oxide (MAG-OX) 400 mg (241.3 mg magnesium) tablet     metroNIDAZOLE (FLAGYL) 500 MG tablet     mupirocin (BACTROBAN) 2 % ointment     ORENCIA CLICKJECT 125 mg/mL AtIn     oxyCODONE-acetaminophen (PERCOCET)  mg per tablet     phentermine (ADIPEX-P) 37.5 mg tablet     potassium chloride SA (K-DUR,KLOR-CON) 20 MEQ tablet     tiZANidine (ZANAFLEX) 4 MG tablet     triamcinolone acetonide 0.5% (KENALOG) 0.5 % Crea     VITAMIN D3 25 mcg (1,000 unit) tablet     XARELTO 10 mg Tab Patient states is no longer taking Xarelto     Family History    None       Tobacco Use    Smoking status: Never Smoker    Smokeless tobacco: Never Used   Substance and Sexual Activity    Alcohol use: Not Currently    Drug use: Not Currently    Sexual activity: Not on  "file     Review of Systems   Constitutional: Negative.     Objective:     Vital Signs (Most Recent):    Vital Signs (24h Range):        Weight: (!) 148.8 kg (328 lb)  Height: 5' 6" (167.6 cm)  Body mass index is 52.94 kg/m².    No intake or output data in the 24 hours ending 21 2834    General    Constitutional: She is oriented to person, place, and time. She appears well-developed and well-nourished.   HENT:   Head: Normocephalic and atraumatic.   Eyes: EOM are normal. Pupils are equal, round, and reactive to light.   Neck: Neck supple.   Cardiovascular: Normal rate and regular rhythm.    Pulmonary/Chest: Effort normal and breath sounds normal.   Abdominal: Soft. Bowel sounds are normal.   Neurological: She is alert and oriented to person, place, and time.   Psychiatric: She has a normal mood and affect. Her behavior is normal.     General Musculoskeletal Exam   Gait: antalgic and abnormal       Right Knee Exam     Inspection   Effusion: present    Tenderness   The patient is tender to palpation of the medial joint line.    Crepitus   The patient has crepitus of the patella and medial joint line.    Range of Motion   Extension: abnormal   Flexion: abnormal     Other   Sensation: normal    Left Knee Exam     Inspection   Swelling: present  Effusion: present    Tenderness   The patient tender to palpation of the medial joint line.    Crepitus   The patient has crepitus of the patella and medial joint line.    Range of Motion   Extension: abnormal   Flexion: abnormal     Tests   Stability Lachman: normal (-1 to 2mm) PCL-Posterior Drawer: normal (0 to 2mm)  MCL - Valgus: normal (0 to 2mm)  LCL - Varus: normal (0 to 2mm)Pivot Shift: normal (Equal)Reverse Pivot Shift: normal (Equal)    Other   Sensation: normal    Muscle Strength   Left Lower Extremity   Hip Abduction: 5/5   Quadriceps:  5/5   Hamstrin/5     Vascular Exam     Right Pulses  Dorsalis Pedis:      2+  Posterior Tibial:      2+        Left " Pulses  Dorsalis Pedis:      2+  Posterior Tibial:      2+            Significant Labs: All pertinent labs within the past 24 hours have been reviewed.    Significant Imaging: I have reviewed all pertinent imaging results/findings.

## 2021-12-07 NOTE — HPI
Chief complaint:  Left knee pain  History:  Marian Foster is a 51 y.o. female seen for evaluation of bilateral knee pain.  Left knee is more symptomatic than the right.  Symptoms began insidiously 10 years or more in a been shoulder progressive over time.  She reported has been diagnosed with psoriatic arthritis.  She sees rheumatologist Dr. Masters in [place].  She has had periodic intra-articular steroid injections as well as viscosupplementation injections.  She last had corticosteroid injections both knees 08/19/2021.  She now only deriving 2-3 days improvement in symptoms.  Her pain is worse with weight-bearing.  She has component of night pain.  Symptoms disrupt her desired activities of daily living.  She is here today requesting consideration of total knee replacement arthroplasty on the left side.  She sees Dr. Sutton for her routine medical needs.  She has never had an MI or CVA.  She does have history of sleep apnea and uses a CPAP and oxygen at home.  She is not on the anticoagulation therapy.  She experiences pain over both the medial and lateral aspect of both knees.  X-rays left knee three views:  Bones well mineralized.  There is moderate severe narrowing of medial and lateral joint spaces with marginal osteophyte formation and tricompartmental distribution.  There is no evidence of acute fracture dislocation or pathologic bone.  X-rays right knee three views:  The bones well mineralized.  There is moderately severe narrowing of medial and lateral joint spaces.  Heart marginal osteophyte formation is present in a tricompartmental distribution.  No evidence of acute fracture dislocation or pathologic bone.  Impression:  Severe DJD both knees  Plan:  Patient wished to proceed with a computer navigated left total knee replacement arthroplasty.  Procedure was discussed in detail using models and actual components.  The potential benefits and risks of surgery outlined to include but not limited to  bleeding, infection, damage to blood vessels and nerves, need for further surgery, other risks and complications including even death patient wished proceed.

## 2021-12-07 NOTE — OP NOTE
[facility] - Orthopedic Periop Services  Surgery Department  Operative Note    SUMMARY     Date of Procedure: 12/7/2021     Procedure: Procedure(s) (LRB):  ARTHROPLASTY, KNEE, TOTAL, USING COMPUTER-ASSISTED NAVIGATION (Left)     Surgeon(s) and Role:     * Gaurang Toussaint MD - Primary    Assisting Surgeon: None    Pre-Operative Diagnosis: Primary osteoarthritis of left knee [M17.12]    Post-Operative Diagnosis: Post-Op Diagnosis Codes:     * Primary osteoarthritis of left knee [M17.12]    Anesthesia: general    Technical Procedures Used:  Computer navigated left TKR           DEPARTMENT OF ORTHOPEDIC SURGERY                OPERATIVE REPORT       NAME:  Marian Foster  MRN: [id number]   DATE OF SURGERY:  12/7/2021      PREOPERATIVE DIAGNOSIS:  Primary osteoarthritis of left knee [M17.12]       POSTOPERATIVE DIAGNOSIS: Primary osteoarthritis of left knee [M17.12]       PROCEDURE:   Computer navigated left total knee replacement arthroplasty      ANESTHESIA:  General      PROCEDURE IN DETAIL:  The patient was taken to the operating room and placed in supine position.  After an adequate level of general/regional block anesthesia been achieved (see Anesthesia note) patient's right lower extremity is covered Betadine and draped in sterile fashion.  The right lower extremity was elevated exsanguinated with elastic bandage.  At taken place but the right upper thighs inflated to pressure of 300 mmHg.  The operation was begun by making a skin incision over the anterior midline region of the right knee.  The incision was carried carefully through the subcutaneous layers.  Skin flaps were developed.  The joint was opened through a medial parapatellar incision.  The patella was distracted laterally and the knee was flexed.  a thorough debridement of the joint was performed including removal of the remnants of the menisci.  The ACL was resected.  The PCL was retained.  The computer tracker was attached to the distal femur.  The hip  was brought through a circumduction maneuver registering the center of hip rotation.  A distal femoral cutting guide was attached to the arch bar.  The varus/valgus and flexion/extension parameters were adjusted and the resection guide was pinned in place.  The distal femoral resection was performed with the oscillating saw.  The guides were removed.  With the knee flexed the tibia was brought forward using the PCL guide.  The tibial spines were resected with an oscillating saw.  The tibial arch bar was attached to the tibial plateau cut surface of femoral, tibial and ankle data points were obtained.  Now the proximal tibial cutting guide was attached to the arch bar and adjusted for varus/valgus and depth of her sec.  The proximal tibial resection guide was then pinned in place and the arch bar was removed.  The proximal tibial resection cut was made with an oscillating saw.  Adequate resection was performed with a spacer.  The cutting guide was removed.  the gap space was tensioned using the Lopez guide and varus/valgus alignment was confirmed.  the measured flexion and extension gaps were set on the resection guide and drill holes made for the 4 in 1 cutting block.  The resection guide was attached and pinned in place.  The anterior, posterior, anterior chamfer and posterior chamfer cuts were made with an oscillating saw.  The guide was removed.  The femoral and tibial trials were put in place.  a tibial spacer was engaged with the base plate.  The knee was brought through good stable range of motion.  Rotation of the tibial tray was marked.  The distal femoral peg holes were made with the drill.  Attention was turned to the patella.  The patella was everted and measured with a caliper.  The articular surface of the patella was resected with a patellar mill leaving 14 mm of bone.  peg holes were drilled for the polyethylene patellar button.  Trial component was inserted and he was again brought to a good stable  range of motion.  The trial components were removed.  The knee joint was thoroughly irrigated with antibiotic solution.  The cut tibial surface was again brought anteriorly and a porous coated triathlon [place] size 4 tibia porous-coated tibial component was inserted with a size 11 mm thick polyethylene tibial insert.  Now the size 4 porous-coated femoral component was implanted.  Knee was brought to good stable range of motion.  Attention was turned back to the patella.  The cut surface was irrigated with antibiotic solution.  A 32 mm diameter by 10  mm thick patellar polyethylene button was cemented in place with methylmethacrylate.  The cement was allowed to harden and the excess was removed.  The tourniquet was then let down and hemostasis was achieved with Bovie electrocautery.  The knee was again irrigated with antibiotic solution.  The quadriceps and medial retinacular tissues were reapproximated with interrupted 1. Vicryl suture.  Subcutaneous tissue was approximated with 2 O Vicryl suture.  Skin margin approximated with stainless steel staples.  Wounds were dressed sterilely and a knee immobilizer was applied.  Patient taken to recovery room in satisfactory condition.  Estimated blood loss 50 cc        Gaurang Toussaint MD              Significant Surgical Tasks Conducted by the Assistant(s), if Applicable:     Complications: No    Estimated Blood Loss (EBL): * No values recorded between 12/7/2021  1:13 PM and 12/7/2021  2:50 PM *           Implants:   Implant Name Type Inv. Item Serial No.  Lot No. LRB No. Used Action   GUIDEPIN 3.20MM 4 GALILEA SQUARE - [id number]  GUIDEPIN 3.20MM 4 GALILEA SQUARE  [facility] [id number] Left 1 Implanted and Explanted   GUIDESCREW 6 GALILEA HEXAGONAL 3.20MM - [id number]  GUIDESCREW 6 GALILEA HEXAGONAL 3.20MM  [facility] ([facility]) [id number] Left 1 Implanted and Explanted   CEMENT BONE SMARTSET HV 40G - [id number] Cement CEMENT BONE SMARTSET HV 40G  [facility] [id number] Left 1 Implanted   IMP COMP TIB TRIATHLON SZ4 - [id number] Prosthesis IMP COMP TIB TRIATHLON SZ4  [employer] ([facility]) [id number] Left 1 Implanted   IMP INSERT TIBIAL BEARING 4X11MM - [id number] Prosthesis IMP INSERT TIBIAL BEARING 4X11MM  [employer] ([facility]) [id number] Left 1 Implanted   IMP COMPONENT FEM 4 LT CR - [id number] Prosthesis IMP COMPONENT FEM 4 LT CR  [employer] ([facility]) [id number] Left 1 Implanted   IMP PATELLA ASYMMETRIC A32 10MM - [id number] Prosthesis IMP PATELLA ASYMMETRIC A32 10MM  [employer] ([facility]) [id number] Left 1 Implanted       Specimens:   Specimen (24h ago, onward)            None                  Condition: Good    Disposition: PACU - hemodynamically stable.    Attestation: I was present and scrubbed for the entire procedure.

## 2021-12-07 NOTE — BRIEF OP NOTE
[facility] - Orthopedic Periop Services  Brief Operative Note    SUMMARY     Surgery Date: 12/7/2021     Surgeon(s) and Role:     * Gaurang Toussaint MD - Primary    Assisting Surgeon: None    Pre-op Diagnosis:  Primary osteoarthritis of left knee [M17.12]    Post-op Diagnosis:  Post-Op Diagnosis Codes:     * Primary osteoarthritis of left knee [M17.12]    Procedure(s) (LRB):  ARTHROPLASTY, KNEE, TOTAL, USING COMPUTER-ASSISTED NAVIGATION (Left)    Anesthesia: general    Operative Findings:     Estimated Blood Loss: * No values recorded between 12/7/2021  1:13 PM and 12/7/2021  2:50 PM *50cc    Estimated Blood Loss has been documented.         Specimens:   Specimen (24h ago, onward)            None          [id number]

## 2021-12-07 NOTE — OR NURSING
DR. WHITE  HERE AT BEDSIDE CHECKING ON PATIENT. PATIENT AWAKE, ALERT, TALKATIVE. O2 SATS WNL ON 3L NC O2. STILL C/O PAIN. NO FURTHER NARCOTICS GIVEN. OK TO TRANSFER BACK TO ROOM PER DR. WHITE.

## 2021-12-07 NOTE — ASSESSMENT & PLAN NOTE
Body mass index is 50.56 kg/m². Morbid obesity complicates all aspects of disease management from diagnostic modalities to treatment. Weight loss encouraged and health benefits explained to patient.

## 2021-12-07 NOTE — ASSESSMENT & PLAN NOTE
- Orders per orthopedics specific to surgery   - SCD lower legs   - Anticoagulation  When ok Surgery   - PO Protonix daily for ulcer ppx   - Incentive spirometry hourly    - early ambulation

## 2021-12-08 VITALS
DIASTOLIC BLOOD PRESSURE: 64 MMHG | SYSTOLIC BLOOD PRESSURE: 126 MMHG | HEART RATE: 89 BPM | RESPIRATION RATE: 18 BRPM | BODY MASS INDEX: 45.99 KG/M2 | WEIGHT: 293 LBS | TEMPERATURE: 98 F | HEIGHT: 67 IN | OXYGEN SATURATION: 94 %

## 2021-12-08 LAB
ALBUMIN SERPL BCP-MCNC: 3 G/DL (ref 3.5–5)
ALBUMIN/GLOB SERPL: 0.8 {RATIO}
ALP SERPL-CCNC: 112 U/L (ref 41–108)
ALT SERPL W P-5'-P-CCNC: 35 U/L (ref 13–56)
ANION GAP SERPL CALCULATED.3IONS-SCNC: 11 MMOL/L (ref 7–16)
AST SERPL W P-5'-P-CCNC: 49 U/L (ref 15–37)
BASOPHILS # BLD AUTO: 0.06 K/UL (ref 0–0.2)
BASOPHILS NFR BLD AUTO: 0.4 % (ref 0–1)
BILIRUB SERPL-MCNC: 0.6 MG/DL (ref 0–1.2)
BUN SERPL-MCNC: 14 MG/DL (ref 7–18)
BUN/CREAT SERPL: 20 (ref 6–20)
CALCIUM SERPL-MCNC: 8.1 MG/DL (ref 8.5–10.1)
CHLORIDE SERPL-SCNC: 106 MMOL/L (ref 98–107)
CO2 SERPL-SCNC: 27 MMOL/L (ref 21–32)
CREAT SERPL-MCNC: 0.7 MG/DL (ref 0.55–1.02)
DIFFERENTIAL METHOD BLD: ABNORMAL
EOSINOPHIL # BLD AUTO: 0.02 K/UL (ref 0–0.5)
EOSINOPHIL NFR BLD AUTO: 0.1 % (ref 1–4)
ERYTHROCYTE [DISTWIDTH] IN BLOOD BY AUTOMATED COUNT: 12.3 % (ref 11.5–14.5)
GLOBULIN SER-MCNC: 3.6 G/DL (ref 2–4)
GLUCOSE SERPL-MCNC: 118 MG/DL (ref 74–106)
HCT VFR BLD AUTO: 36.2 % (ref 38–47)
HGB BLD-MCNC: 11.4 G/DL (ref 12–16)
IMM GRANULOCYTES # BLD AUTO: 0.06 K/UL (ref 0–0.04)
IMM GRANULOCYTES NFR BLD: 0.4 % (ref 0–0.4)
LYMPHOCYTES # BLD AUTO: 2.19 K/UL (ref 1–4.8)
LYMPHOCYTES NFR BLD AUTO: 14.5 % (ref 27–41)
MCH RBC QN AUTO: 29.8 PG (ref 27–31)
MCHC RBC AUTO-ENTMCNC: 31.5 G/DL (ref 32–36)
MCV RBC AUTO: 94.5 FL (ref 80–96)
MONOCYTES # BLD AUTO: 1.09 K/UL (ref 0–0.8)
MONOCYTES NFR BLD AUTO: 7.2 % (ref 2–6)
MPC BLD CALC-MCNC: 10.2 FL (ref 9.4–12.4)
NEUTROPHILS # BLD AUTO: 11.71 K/UL (ref 1.8–7.7)
NEUTROPHILS NFR BLD AUTO: 77.4 % (ref 53–65)
NRBC # BLD AUTO: 0 X10E3/UL
NRBC, AUTO (.00): 0 %
PLATELET # BLD AUTO: 288 K/UL (ref 150–400)
POTASSIUM SERPL-SCNC: 4.3 MMOL/L (ref 3.5–5.1)
PROT SERPL-MCNC: 6.6 G/DL (ref 6.4–8.2)
RBC # BLD AUTO: 3.83 M/UL (ref 4.2–5.4)
SARS-COV-2 RDRP RESP QL NAA+PROBE: NEGATIVE
SODIUM SERPL-SCNC: 140 MMOL/L (ref 136–145)
WBC # BLD AUTO: 15.13 K/UL (ref 4.5–11)

## 2021-12-08 PROCEDURE — 25000003 PHARM REV CODE 250: Performed by: INTERNAL MEDICINE

## 2021-12-08 PROCEDURE — 99232 SBSQ HOSP IP/OBS MODERATE 35: CPT | Mod: ,,, | Performed by: INTERNAL MEDICINE

## 2021-12-08 PROCEDURE — 80053 COMPREHEN METABOLIC PANEL: CPT | Performed by: ORTHOPAEDIC SURGERY

## 2021-12-08 PROCEDURE — 99232 PR SUBSEQUENT HOSPITAL CARE,LEVL II: ICD-10-PCS | Mod: ,,, | Performed by: INTERNAL MEDICINE

## 2021-12-08 PROCEDURE — 97535 SELF CARE MNGMENT TRAINING: CPT

## 2021-12-08 PROCEDURE — 36415 COLL VENOUS BLD VENIPUNCTURE: CPT | Performed by: ORTHOPAEDIC SURGERY

## 2021-12-08 PROCEDURE — 25000003 PHARM REV CODE 250: Performed by: ORTHOPAEDIC SURGERY

## 2021-12-08 PROCEDURE — G0378 HOSPITAL OBSERVATION PER HR: HCPCS

## 2021-12-08 PROCEDURE — 63600175 PHARM REV CODE 636 W HCPCS: Performed by: ORTHOPAEDIC SURGERY

## 2021-12-08 PROCEDURE — 85025 COMPLETE CBC W/AUTO DIFF WBC: CPT | Performed by: ORTHOPAEDIC SURGERY

## 2021-12-08 PROCEDURE — 87635 SARS-COV-2 COVID-19 AMP PRB: CPT | Performed by: ORTHOPAEDIC SURGERY

## 2021-12-08 PROCEDURE — 97110 THERAPEUTIC EXERCISES: CPT | Mod: CQ

## 2021-12-08 RX ORDER — LANOLIN ALCOHOL/MO/W.PET/CERES
400 CREAM (GRAM) TOPICAL 2 TIMES DAILY
Status: DISCONTINUED | OUTPATIENT
Start: 2021-12-08 | End: 2021-12-08 | Stop reason: HOSPADM

## 2021-12-08 RX ORDER — LANOLIN ALCOHOL/MO/W.PET/CERES
400 CREAM (GRAM) TOPICAL 2 TIMES DAILY
Status: DISCONTINUED | OUTPATIENT
Start: 2021-12-08 | End: 2021-12-08

## 2021-12-08 RX ORDER — CARBAMAZEPINE 100 MG/1
100 TABLET, EXTENDED RELEASE ORAL DAILY
Status: DISCONTINUED | OUTPATIENT
Start: 2021-12-08 | End: 2021-12-08

## 2021-12-08 RX ORDER — HYDROCODONE BITARTRATE AND ACETAMINOPHEN 10; 325 MG/1; MG/1
TABLET ORAL
Qty: 30 TABLET | Refills: 0 | Status: ON HOLD | OUTPATIENT
Start: 2021-12-08 | End: 2022-05-20 | Stop reason: HOSPADM

## 2021-12-08 RX ORDER — ESCITALOPRAM OXALATE 10 MG/1
20 TABLET ORAL DAILY
Status: DISCONTINUED | OUTPATIENT
Start: 2021-12-08 | End: 2021-12-08 | Stop reason: HOSPADM

## 2021-12-08 RX ORDER — CARBAMAZEPINE 100 MG/1
100 TABLET, EXTENDED RELEASE ORAL DAILY
Status: DISCONTINUED | OUTPATIENT
Start: 2021-12-08 | End: 2021-12-08 | Stop reason: HOSPADM

## 2021-12-08 RX ORDER — KETOROLAC TROMETHAMINE 30 MG/ML
30 INJECTION, SOLUTION INTRAMUSCULAR; INTRAVENOUS ONCE
Status: COMPLETED | OUTPATIENT
Start: 2021-12-08 | End: 2021-12-08

## 2021-12-08 RX ORDER — POTASSIUM CHLORIDE 20 MEQ/1
20 TABLET, EXTENDED RELEASE ORAL DAILY
Status: DISCONTINUED | OUTPATIENT
Start: 2021-12-08 | End: 2021-12-08

## 2021-12-08 RX ORDER — FEBUXOSTAT 40 MG/1
40 TABLET, FILM COATED ORAL DAILY
Status: DISCONTINUED | OUTPATIENT
Start: 2021-12-08 | End: 2021-12-08 | Stop reason: HOSPADM

## 2021-12-08 RX ORDER — CARBAMAZEPINE 100 MG/1
200 TABLET, EXTENDED RELEASE ORAL NIGHTLY
Status: DISCONTINUED | OUTPATIENT
Start: 2021-12-08 | End: 2021-12-08 | Stop reason: HOSPADM

## 2021-12-08 RX ORDER — POTASSIUM CHLORIDE 20 MEQ/1
20 TABLET, EXTENDED RELEASE ORAL DAILY
Status: DISCONTINUED | OUTPATIENT
Start: 2021-12-08 | End: 2021-12-08 | Stop reason: HOSPADM

## 2021-12-08 RX ORDER — CARBAMAZEPINE 100 MG/1
200 TABLET, EXTENDED RELEASE ORAL DAILY
Status: DISCONTINUED | OUTPATIENT
Start: 2021-12-08 | End: 2021-12-08 | Stop reason: HOSPADM

## 2021-12-08 RX ADMIN — ESCITALOPRAM OXALATE 20 MG: 10 TABLET ORAL at 10:12

## 2021-12-08 RX ADMIN — ESCITALOPRAM OXALATE 20 MG: 10 TABLET ORAL at 01:12

## 2021-12-08 RX ADMIN — FEBUXOSTAT 40 MG: 40 TABLET, FILM COATED ORAL at 12:12

## 2021-12-08 RX ADMIN — POTASSIUM CHLORIDE 20 MEQ: 20 TABLET, EXTENDED RELEASE ORAL at 09:12

## 2021-12-08 RX ADMIN — CARBAMAZEPINE 200 MG: 100 TABLET, EXTENDED RELEASE ORAL at 12:12

## 2021-12-08 RX ADMIN — ONDANSETRON 4 MG: 2 INJECTION INTRAMUSCULAR; INTRAVENOUS at 04:12

## 2021-12-08 RX ADMIN — CEFAZOLIN 2 G: 10 INJECTION, POWDER, FOR SOLUTION INTRAVENOUS at 04:12

## 2021-12-08 RX ADMIN — APIXABAN 2.5 MG: 2.5 TABLET, FILM COATED ORAL at 09:12

## 2021-12-08 RX ADMIN — HYDROCODONE BITARTRATE AND ACETAMINOPHEN 1 TABLET: 10; 325 TABLET ORAL at 12:12

## 2021-12-08 RX ADMIN — CARBAMAZEPINE 100 MG: 100 TABLET, EXTENDED RELEASE ORAL at 12:12

## 2021-12-08 RX ADMIN — MORPHINE SULFATE 4 MG: 4 INJECTION INTRAVENOUS at 04:12

## 2021-12-08 RX ADMIN — FEBUXOSTAT 40 MG: 40 TABLET, FILM COATED ORAL at 09:12

## 2021-12-08 RX ADMIN — Medication 400 MG: at 01:12

## 2021-12-08 RX ADMIN — CARBAMAZEPINE 200 MG: 100 TABLET, EXTENDED RELEASE ORAL at 09:12

## 2021-12-08 RX ADMIN — HYDROCODONE BITARTRATE AND ACETAMINOPHEN 1 TABLET: 10; 325 TABLET ORAL at 02:12

## 2021-12-08 RX ADMIN — VANCOMYCIN HYDROCHLORIDE 1000 MG: 1 INJECTION, POWDER, FOR SOLUTION INTRAVENOUS at 12:12

## 2021-12-08 RX ADMIN — MORPHINE SULFATE 4 MG: 4 INJECTION INTRAVENOUS at 09:12

## 2021-12-08 RX ADMIN — KETOROLAC TROMETHAMINE 30 MG: 30 INJECTION, SOLUTION INTRAMUSCULAR; INTRAVENOUS at 12:12

## 2021-12-08 RX ADMIN — Medication 400 MG: at 09:12

## 2021-12-08 RX ADMIN — POTASSIUM CHLORIDE 20 MEQ: 20 TABLET, EXTENDED RELEASE ORAL at 01:12

## 2021-12-08 RX ADMIN — HYDROCODONE BITARTRATE AND ACETAMINOPHEN 1 TABLET: 10; 325 TABLET ORAL at 07:12

## 2021-12-08 NOTE — DISCHARGE INSTRUCTIONS
*Keep dressing dry and intact, do not remove dressing, if dressing becomes wet or bloody notify home health staff.  Home Health will remove your drain (if you have one) on post op day #2 and change your dressing on post op day #3, give them that special dressing we sent home with you.  *Continue incentive spirometry at least every 2 hours while awake.  *Continue white stockings remove 2 times a day for 1 hour and replace. One dressing is changed, apply other stocking to surgery leg.   *Continue cool-jet to knee. Do not apply directly to skin.   *Take laxative of choice to have a bowel movement at least by tomorrow and then every other day.  *Increase fluids by mouth.  *Staples will be removed by home health/swingbed staff 2 weeks from surgery prior to follow up appoinment.  *Elevate surgery leg on pillow at ankle. No pillow under knees.  *Notify home health staff of any concerns.

## 2021-12-08 NOTE — PLAN OF CARE
[facility] - Orthopedic  Discharge Final Note    Primary Care Provider: Primary Doctor No    Expected Discharge Date: 12/8/2021    Final Discharge Note (most recent)     Final Note - 12/08/21 1137        Final Note    Assessment Type Final Discharge Note     Anticipated Discharge Disposition Rehab Facility   [facility]    What phone number can be called within the next 1-3 days to see how you are doing after discharge? [phone]        Post-Acute Status    Post-Acute Authorization Placement;Home Health     Post-Acute Placement Status Set-up Complete/Auth obtained     Home Health Status Set-up Complete/Auth obtained     Patient choice form signed by patient/caregiver List with quality metrics by geographic area provided;List from CMS Compare     Discharge Delays None known at this time                 Important Message from Medicare              Follow-up providers     SYLVIA Devine   Specialty: Emergency Medicine    [address] [facility] Emergency [facility] [employer]  [facility] [address]   Phone: [phone]       Next Steps: Follow up in 2 week(s)    Instructions: follow up on Dec.23 at 9:00          After-discharge care            Destination     [facility]   [facility]    [address] [facility] [address]   Phone: [phone]             Home Medical Care     [facility]   Service: Home Health Services    [address] [facility] [address]   Phone: [phone]                       CLEMENCIA spoke with Valorie and pt has been approved for [facility]. CLEMENCIA faxed dc summary and orders to Valorie. Pkt made and left with nurse and pt informed of acceptance. No other needs.

## 2021-12-08 NOTE — PT/OT/SLP EVAL
Physical Therapy Evaluation    Patient Name:  Marian Foster   MRN:  [id number]    Recommendations:     Discharge Recommendations:  home,home with home health   Discharge Equipment Recommendations: 3-in-1 commode,walker, rolling   Barriers to discharge: None    Assessment:     Marian Foster is a 51 y.o. female admitted with a medical diagnosis of Primary osteoarthritis of left knee.  She presents with the following impairments/functional limitations:  impaired functional mobilty,pain,gait instability Patient with normal post op pain limiting mobility. Will start rom in am as ordered.    Rehab Prognosis: Good; patient would benefit from acute skilled PT services to address these deficits and reach maximum level of function.    Recent Surgery: Procedure(s) (LRB):  ARTHROPLASTY, KNEE, TOTAL, USING COMPUTER-ASSISTED NAVIGATION (Left) Day of Surgery    Plan:     During this hospitalization, patient to be seen BID to address the identified rehab impairments via gait training,therapeutic activities,therapeutic exercises and progress toward the following goals:    · Plan of Care Expires:  01/07/22    Subjective     Chief Complaint: Post op pain  Patient/Family Comments/goals: Patient plans on dc home tomrorow  Pain/Comfort:  · Pain Rating 1: 5/10  · Location - Side 1: Left  · Location 1: knee  · Pain Addressed 1: Pre-medicate for activity    Patients cultural, spiritual, Episcopal conflicts given the current situation: no    Living Environment:  Lives with child  Prior to admission, patients level of function was independent.  Equipment used at home: none.  DME owned (not currently used): none.  Upon discharge, patient will have assistance from family.    Objective:     Communicated with nurse prior to session.  Patient found supine with blood pressure cuff,cryotherapy,knee immobilizer,GIACOMO drain  upon PT entry to room.    General Precautions: Standard, fall   Orthopedic Precautions:LLE weight bearing as tolerated   Braces:  Knee immobilizer  Respiratory Status:  · Flow (L/min): 2  · O2 Device (Oxygen Therapy): nasal cannula    Exams:  · RLE ROM: WFL  · RLE Strength: WNL  · LLE ROM: 3/5  · LLE Strength: immobilized    Functional Mobility:  · Bed Mobility:     · Supine to Sit: minimum assistance  · Transfers:     · Sit to Stand:  minimum assistance with no AD  · Gait: ambulated 30 feet with walker step to gait, limited due to nausea  · Balance: good    Therapeutic Activities and Exercises:   na    AM-PAC 6 CLICK MOBILITY  Total Score:16     Patient left supine with call button in reach.    GOALS:   Multidisciplinary Problems     Physical Therapy Goals        Problem: Physical Therapy Goal    Goal Priority Disciplines Outcome Goal Variances Interventions   Physical Therapy Goal     PT, PT/OT Ongoing, Progressing     Description: Short Term Goals  Independent with HEP  Independent with walkerx 100 feet FWB/WBAT: left lower extremity  0-100 knee flexion to allow normal sit to stand    Long term goals  Needed equipment for home.                        History:     Past Medical History:   Diagnosis Date    Arthritis     COPD (chronic obstructive pulmonary disease)     Disorder of kidney and ureter     Hypertension     Liver disease     Osteoarthritis     Seizures     Sleep apnea        Past Surgical History:   Procedure Laterality Date    ABDOMINAL SURGERY      APPENDECTOMY      BREAST SURGERY      CHOLECYSTECTOMY      GANGLION CYST EXCISION Left     HYSTERECTOMY      KNEE ARTHROSCOPY      LAPAROSCOPY      lithrotripsy      MYRINGOTOMY W/ TUBES Left     TONSILLECTOMY      TRIGGER FINGER RELEASE Right        Time Tracking:     PT Received On: 12/07/21  PT Start Time: 1800     PT Stop Time: 1830  PT Total Time (min): 30 min     Billable Minutes: Evaluation 20       12/07/2021

## 2021-12-08 NOTE — HOSPITAL COURSE
Computer aided left total knee replacement arthroplasty under general anesthesia regional block.  Hospital course was essentially uncomplicated.  T-max during hospital stay was 98.9° F. she is treated prophylactically with IV antibiotic therapy include cefazolin and vancomycin.  Drain had total output of 240 mL. She did not require blood transfusion.  Laboratory data at time of this dictation showed hemoglobin 11.4 hematocrit 36.2.  She had gradual improvement is able to ambulate 30 ft with aid of a rolling walker.  She is under the care pain management.  She has Percocet 10s that she takes at home.  We will stop those at this time.  She is discharged home with [place]  p.o. q.4 hours or 2 p.o. q.6 hours number 30 no refills as needed for pain left knee.  [facility] was reviewed.  Patient was found.

## 2021-12-08 NOTE — PT/OT/SLP EVAL
Occupational Therapy   Evaluation    Name: Marian Foster  MRN: [id number]  Admitting Diagnosis:  Primary osteoarthritis of left knee  Recent Surgery: Procedure(s) (LRB):  ARTHROPLASTY, KNEE, TOTAL, USING COMPUTER-ASSISTED NAVIGATION (Left) Day of Surgery    Recommendations:     Discharge Recommendations: home with home health,nursing facility, skilled  Discharge Equipment Recommendations:   (To be determined)  Barriers to discharge:  None    Assessment:     Marian Foster is a 51 y.o. female with a medical diagnosis of Primary osteoarthritis of left knee.  She presents with decline in functional status. Performance deficits affecting function: impaired self care skills,impaired functional mobilty,impaired endurance,gait instability,pain,decreased ROM.      Rehab Prognosis: Good; patient would benefit from acute skilled OT services to address these deficits and reach maximum level of function.       Plan:     Patient to be seen 5 x/week to address the above listed problems via self-care/home management,therapeutic activities,therapeutic exercises  · Plan of Care Expires:    · Plan of Care Reviewed with: patient    Subjective     Chief Complaint: (L) TKR  Patient/Family Comments/goals: To return Home    Occupational Profile:  Living Environment: pt lives at home with son, sister and granddaughter  Previous level of function: pt reports being (I) prior  Roles and Routines: Pt is disabled  Equipment Used at Home:     Assistance upon Discharge: Family    Pain/Comfort:  · Pain Rating 1: 8/10  · Location - Side 1: Left  · Location 1: knee  · Pain Addressed 1: Reposition,Distraction    Patients cultural, spiritual, Scientologist conflicts given the current situation: no    Objective:     Communicated with: JASPAL James prior to session.  Patient found HOB elevated with blood pressure cuff,cryotherapy,knee immobilizer,GIACOMO drain,oxygen,peripheral IV,SCD upon OT entry to room.    General Precautions: Standard, fall   Orthopedic  Precautions:LLE weight bearing as tolerated   Braces: Knee immobilizer  Respiratory Status::   · Flow (L/min): 3  · O2 Device (Oxygen Therapy): nasal cannula    Occupational Performance:    Bed Mobility:    · Patient completed Rolling/Turning to Left with  minimum assistance  · Patient completed Supine to Sit with minimum assistance  · Patient completed Sit to Supine with minimum assistance    Functional Mobility/Transfers:  · Patient completed Sit <> Stand Transfer with minimum assistance  with  rolling walker   · Functional Mobility: Min a     Activities of Daily Living:  · Upper Body Dressing: minimum assistance donning gown as a robe    Cognitive/Visual Perceptual:  Cognitive/Psychosocial Skills:  -       Oriented to: Person and Time situation  -       Follows Commands/attention:Follows one-step commands  -       Communication: clear/fluent  Visual/Perceptual:      -Intact wears glasses. hearing wfl    Physical Exam:  Balance:    -       SBA with EOB sitting  Skin integrity: Visible skin intact  Edema:  None noted  Sensation:    -       Intact  Motor Planning:    -       wfl  Dominant hand:    -       right  Upper Extremity Range of Motion:     -       Right Upper Extremity: WFL  -       Left Upper Extremity: WFL  Upper Extremity Strength:    -       Right Upper Extremity: WFL  -       Left Upper Extremity: WFL   Strength:    -       Right Upper Extremity: WFL  -       Left Upper Extremity: WFL  Fine Motor Coordination:    -       Intact  Gross motor coordination:   WFL    AMPAC 6 Click ADL:  AMPAC Total Score: 18    Treatment & Education:  OT evaluation completed. See eval for details.  · Pt educated on OT role/POC.   · Importance of OOB activity with staff assistance.  · Importance of sitting up in the chair throughout the day as tolerated, especially for meals   · Safety during functional t/f and mobility with use of RW  · Importance of assisting with self-care activities   · All questions/concerns  answered within OT scope of practice    Education:    Patient left HOB elevated with all lines intact, call button in reach and nurse notified    GOALS:   Multidisciplinary Problems     Occupational Therapy Goals        Problem: Occupational Therapy Goal    Goal Priority Disciplines Outcome Interventions   Occupational Therapy Goal     OT, PT/OT Ongoing, Progressing    Description: ST.Pt will perform bathing with min a  2.Pt will perform UE dressing (I)  3.Pt will perform LE dressing with min a with AD as needed  4.Pt will transfer bed/chair/bsc with CGA  5.Pt will perform standing task x 2 min with CGA   6.Tolerate 15 min of tx without fatigue.      LTG:   Restore to max I with selfcare and mobility.                      History:     Past Medical History:   Diagnosis Date    Arthritis     COPD (chronic obstructive pulmonary disease)     Disorder of kidney and ureter     Hypertension     Liver disease     Osteoarthritis     Seizures     Sleep apnea        Past Surgical History:   Procedure Laterality Date    ABDOMINAL SURGERY      APPENDECTOMY      BREAST SURGERY      CHOLECYSTECTOMY      GANGLION CYST EXCISION Left     HYSTERECTOMY      KNEE ARTHROSCOPY      LAPAROSCOPY      lithrotripsy      MYRINGOTOMY W/ TUBES Left     TONSILLECTOMY      TRIGGER FINGER RELEASE Right        Time Tracking:     OT Date of Treatment: 21  OT Start Time:   OT Stop Time:   OT Total Time (min): 13 min    Billable Minutes:Evaluation 13    2021

## 2021-12-08 NOTE — DISCHARGE SUMMARY
[facility] - Orthopedic  Orthopedics  Discharge Summary      Patient Name: Marian Foster  MRN: [id number]  Admission Date: 12/7/2021  Hospital Length of Stay: 1 days  Discharge Date and Time:  12/08/2021 8:41 AM  Attending Physician: Gaurang Toussaint MD   Discharging Provider: SYLVIA Devine  Primary Care Provider: Primary Doctor No    HPI:   Chief complaint:  Left knee pain  History:  Marian Foster is a 51 y.o. female seen for evaluation of bilateral knee pain.  Left knee is more symptomatic than the right.  Symptoms began insidiously 10 years or more in a been shoulder progressive over time.  She reported has been diagnosed with psoriatic arthritis.  She sees rheumatologist Dr. Masters in [place].  She has had periodic intra-articular steroid injections as well as viscosupplementation injections.  She last had corticosteroid injections both knees 08/19/2021.  She now only deriving 2-3 days improvement in symptoms.  Her pain is worse with weight-bearing.  She has component of night pain.  Symptoms disrupt her desired activities of daily living.  She is here today requesting consideration of total knee replacement arthroplasty on the left side.  She sees Dr. Sutton for her routine medical needs.  She has never had an MI or CVA.  She does have history of sleep apnea and uses a CPAP and oxygen at home.  She is not on the anticoagulation therapy.  She experiences pain over both the medial and lateral aspect of both knees.  X-rays left knee three views:  Bones well mineralized.  There is moderate severe narrowing of medial and lateral joint spaces with marginal osteophyte formation and tricompartmental distribution.  There is no evidence of acute fracture dislocation or pathologic bone.  X-rays right knee three views:  The bones well mineralized.  There is moderately severe narrowing of medial and lateral joint spaces.  Heart marginal osteophyte formation is present in a tricompartmental distribution.  No  evidence of acute fracture dislocation or pathologic bone.  Impression:  Severe DJD both knees  Plan:  Patient wished to proceed with a computer navigated left total knee replacement arthroplasty.  Procedure was discussed in detail using models and actual components.  The potential benefits and risks of surgery outlined to include but not limited to bleeding, infection, damage to blood vessels and nerves, need for further surgery, other risks and complications including even death patient wished proceed.      Procedure(s) (LRB):  ARTHROPLASTY, KNEE, TOTAL, USING COMPUTER-ASSISTED NAVIGATION (Left)      Hospital Course:  Computer aided left total knee replacement arthroplasty under general anesthesia regional block.  Hospital course was essentially uncomplicated.  T-max during hospital stay was 98.9° F. she is treated prophylactically with IV antibiotic therapy include cefazolin and vancomycin.  Drain had total output of 240 mL. She did not require blood transfusion.  Laboratory data at time of this dictation showed hemoglobin 11.4 hematocrit 36.2.  She had gradual improvement is able to ambulate 30 ft with aid of a rolling walker.  She is under the care pain management.  She has Percocet 10s that she takes at home.  We will stop those at this time.  She is discharged home with [place]  p.o. q.4 hours or 2 p.o. q.6 hours number 30 no refills as needed for pain left knee.  [facility] was reviewed.  Patient was found.        Goals of Care Treatment Preferences:  Code Status: Full Code      Consults (From admission, onward)        Status Ordering Provider     Inpatient consult to Hospitalist  Once        Provider:  (Not yet assigned)    Acknowledged KAYA BROWN     IP consult case management/social work  Once        Provider:  (Not yet assigned)    Acknowledged KAYA BORWN          Significant Diagnostic Studies: Labs: All labs within the past 24 hours have been reviewed    Pending Diagnostic  Studies:     None        Final Active Diagnoses:    Diagnosis Date Noted POA    PRINCIPAL PROBLEM:  Primary osteoarthritis of left knee [M17.12] 12/07/2021 Yes    Severe obesity (BMI >= 40) [E66.01] 12/07/2021 Yes     Chronic    S/P total knee arthroplasty, left [Z96.652] 12/07/2021 Not Applicable    GAMAL (obstructive sleep apnea) [G47.33] 12/07/2021 Yes     Chronic    HTN (hypertension) [I10] 12/07/2021 Yes     Chronic    Epilepsy [G40.909] 12/07/2021 Yes     Chronic    Primary osteoarthritis of both knees [M17.0] 10/04/2021 Yes     Chronic      Problems Resolved During this Admission:      Discharged Condition: stable    [place] [facility]    [place] Up:   Follow-up Information     SYLVIA Devine In 2 weeks.    Specialty: Emergency Medicine  Contact information:  [facility] [place] [address] Emergency Room Physicians Pro Fees  [place] [address]  [phone]                       Patient Instructions:   No discharge procedures on file.  Medications:  Reconciled Home Medications:      Medication List      CONTINUE taking these medications    * carBAMazepine 100 MG 12 hr tablet  Commonly known as: TEGRETOL XR  Take 200 mg by mouth once daily. Every am     * carBAMazepine 100 MG 12 hr tablet  Commonly known as: TEGRETOL XR  Take 100 mg by mouth once daily. At lunch time     * carBAMazepine 100 MG 12 hr tablet  Commonly known as: TEGRETOL XR  Take 200 mg by mouth nightly.     clotrimazole-betamethasone 1-0.05% cream  Commonly known as: LOTRISONE     ergocalciferol 50,000 unit Cap  Commonly known as: ERGOCALCIFEROL  Take 50,000 Units by mouth every 7 days.     EScitalopram oxalate 20 MG tablet  Commonly known as: LEXAPRO  Take 20 mg by mouth once daily.     estradiol valerate 20 mg/mL injection  Commonly known as: DELESTROGEN     febuxostat 40 mg Tab  Commonly known as: ULORIC  Take 40 mg by mouth once daily.     magnesium oxide 400 mg (241.3 mg magnesium) tablet  Commonly  known as: MAG-OX  Take 400 mg by mouth 2 (two) times daily.     ORENCIA CLICKJECT 125 mg/mL Atin  Generic drug: abatacept  once a week.     oxyCODONE-acetaminophen  mg per tablet  Commonly known as: PERCOCET     phentermine 37.5 mg tablet  Commonly known as: ADIPEX-P     potassium chloride SA 20 MEQ tablet  Commonly known as: K-DUR,KLOR-CON     tiZANidine 4 MG tablet  Commonly known as: ZANAFLEX  Take 4 mg by mouth once daily.     zafirlukast 20 MG tablet  Commonly known as: ACCOLATE  Take 20 mg by mouth 2 (two) times daily.         * This list has 3 medication(s) that are the same as other medications prescribed for you. Read the directions carefully, and ask your doctor or other care provider to review them with you.            STOP taking these medications    azithromycin 250 MG tablet  Commonly known as: Z-SHORTY     dexAMETHasone 4 MG Tab  Commonly known as: DECADRON     diphenoxylate-atropine 2.5-0.025 mg 2.5-0.025 mg per tablet  Commonly known as: LOMOTIL     hydrOXYchloroQUINE 200 mg tablet  Commonly known as: PLAQUENIL     ibuprofen 800 MG tablet  Commonly known as: ADVIL,MOTRIN     metroNIDAZOLE 500 MG tablet  Commonly known as: FLAGYL     mupirocin 2 % ointment  Commonly known as: BACTROBAN     triamcinolone acetonide 0.5% 0.5 % Crea  Commonly known as: KENALOG     VITAMIN D3 1000 units Tab  Generic drug: vitamin D     XARELTO 10 mg Tab  Generic drug: rivaroxaban            SYLVIA Devine  Orthopedics  [facility] - Orthopedic

## 2021-12-08 NOTE — PT/OT/SLP PROGRESS
Occupational Therapy   Treatment    Name: Marian Foster  MRN: [id number]  Admitting Diagnosis:  Primary osteoarthritis of left knee  1 Day Post-Op    Recommendations:     Discharge Recommendations:  (home with home health)  Discharge Equipment Recommendations:   (recommended a sockaid and reacher)  Barriers to discharge:  None    Assessment:     Marian Foster is a 51 y.o. female with a medical diagnosis of Primary osteoarthritis of left knee.  She presents with decline in functional status. Performance deficits affecting function are impaired self care skills,impaired functional mobilty,pain,decreased lower extremity function.     Rehab Prognosis:  Good; patient would benefit from acute skilled OT services to address these deficits and reach maximum level of function.       Plan:     Patient to be seen 5 x/week to address the above listed problems via self-care/home management,therapeutic activities,therapeutic exercises  · Plan of Care Expires:    · Plan of Care Reviewed with: patient    Subjective     Pain/Comfort:  · Pain Rating 1: 7/10  · Location - Side 1: Left  · Location 1: knee  · Pain Addressed 1: Distraction,Reposition    Objective:     Communicated with: JASPAL Becerra prior to session.  Patient found HOB elevated with knee immobilizer,peripheral IV,hemovac upon OT entry to room.    General Precautions: Standard, fall   Orthopedic Precautions:LLE weight bearing as tolerated   Braces: Knee immobilizer  Respiratory Status:  · Flow (L/min): 2  · O2 Device (Oxygen Therapy): nasal cannula     Occupational Performance:     Bed Mobility:    · Patient completed Rolling/Turning to Left with  minimum assistance  · Patient completed Supine to Sit with minimum assistance     Functional Mobility/Transfers:  · Patient completed Sit <> Stand Transfer with minimum assistance  with  bed elevated and gait belt for assistance   · Patient completed Bed <> Chair Transfer using Step Transfer technique with contact guard  assistance with rolling walker  · Patient completed BSC Transfer Step Transfer technique with contact guard assistance with  rolling walker and gait belt for assistance  · Functional Mobility: CGA    Activities of Daily Living:  · Grooming: independence brushing teeth  · Bathing: I with UE anterior bathing,pt crossed large towel over shoulder to bathe back. Pt stood with CGA with RW for perineum/buttock bathing performed with setup. I with bathing (R) leg and (D) with (R) foot. (D) with bathing toes on (L) due to dressing.   · Upper Body Dressing: independence donning pullover top.  · Lower Body Dressing: Pt donned underwear and pants over feet with reacher with VC. Pt educated to perla affected (L) LE first. Pt stood with CGA to perla over hips. Pt donned socks with sockaid with min a .  · Toileting: independence .      [facility] 6 Click ADL: 22    Treatment & Education:  Pt participated well with ADL retraining. Pt demonstrated understanding regarding AD for LE dressing. Pt did not desire to purchase devices at this time.    Patient left up in chair with all lines intact, call button in reach and nurse notifiedEducation:      GOALS:   Multidisciplinary Problems     Occupational Therapy Goals        Problem: Occupational Therapy Goal    Goal Priority Disciplines Outcome Interventions   Occupational Therapy Goal     OT, PT/OT Ongoing, Progressing    Description: ST.Pt will perform bathing with min a  2.Pt will perform UE dressing (I)  3.Pt will perform LE dressing with min a with AD as needed  4.Pt will transfer bed/chair/bsc with CGA  5.Pt will perform standing task x 2 min with CGA   6.Tolerate 15 min of tx without fatigue.      LTG:   Restore to max I with selfcare and mobility.                      Time Tracking:     OT Date of Treatment: 21  OT Start Time: 1013  OT Stop Time: 1107  OT Total Time (min): 54 min    Billable Minutes:Self Care/Home Management 45    OT/MINNA: OT          2021

## 2021-12-08 NOTE — PLAN OF CARE
[facility]  Initial Discharge Assessment       Primary Care Provider: Primary Doctor No    Admission Diagnosis: Primary osteoarthritis of left knee [M17.12]  Primary osteoarthritis of both knees [M17.0]    Admission Date: 12/7/2021  Expected Discharge Date: 12/8/2021    Discharge Barriers Identified: None    Payor: MEDICARE / Plan: MEDICARE PART A & B / Product Type: Government /     Extended Emergency Contact Information  Primary Emergency Contact: ANTHONY VAUGHAN  Mobile Phone: [phone]  Relation: Daughter  Preferred language: English   needed? No    Discharge Plan A: Rehab ([facility])  Discharge Plan B: Home Health ([facility])      [place], [place] - [address]  [address] [facility] [place] [address]  Phone: [phone] Fax: [phone]      Initial Assessment (most recent)     Adult Discharge Assessment - 12/08/21 1058        Discharge Assessment    Assessment Type Discharge Planning Assessment     Source of Information patient     Lives With child(anthony), adult;grandchild(anthony);sibling(s)     Do you expect to return to your current living situation? Yes     Do you have help at home or someone to help you manage your care at home? Yes     Who are your caregiver(s) and their phone number(s)? Anthony Vaughan- Daughter [phone]     Current cognitive status: Alert/Oriented     Equipment Currently Used at Home other (see comments)   Rollator    Do you currently have service(s) that help you manage your care at home? No     Discharge Plan A Rehab   [facility]    Discharge Plan B Home Health   [facility]    DME Needed Upon Discharge  walker, rolling     Discharge Plan discussed with: Patient     Discharge Barriers Identified None               Pt lives at home with family, not current with  and has a rollator. Pt plans to dc to [facility] and if not accepted pt to dc home with [facility]. PT will need a rw from [facility], choice obtained. SW faxed referral to [facility] and faxed in initial hh referral to [facility]. SW will cont to follow for dc needs.

## 2021-12-08 NOTE — CONSULTS
[facility]  [facility] Medicine  Consult Note    Patient Name: Marian Foster  MRN: [id number]  Admission Date: 12/7/2021  Hospital Length of Stay: 0 days  Attending Physician: Gaurang Toussaint MD   Primary Care Provider: Primary Doctor No           Patient information was obtained from patient and ER records.     Consults  Subjective:     Principal Problem: Primary osteoarthritis of left knee    Chief Complaint: No chief complaint on file.       HPI:   52 y/o female  s/p left knee arthroplasty due to Arthritis.Pt is seen in consult for medical management during this admit. Pt followed by  for bilateral knee pain with worsening of left knee. Pt has failed conservative treatment including injections w/o relief of symptoms.Pt reports pain and weakness within her bilateral knees that affects her activity of daily living. She reports pain with weight bearing especially her left knee and has is at a increased risk of falls.Upon exam pt awakes easily and answers questions. Pt denies SOB or chest pain.Thanks for consult. Will follow while in hospital.                       .  Left knee is more symptomatic than the right.  Symptoms began insidiously 10 years or more in a been shoulder progressive over time.  She reported has been diagnosed with psoriatic arthritis.  She sees rheumatologist Dr. Masters in [place].  She has had periodic intra-articular steroid injections as well as viscosupplementation injections.  She last had corticosteroid injections both knees 08/19/2021.  She now only deriving 2-3 days improvement in symptoms.  Her pain is worse with weight-bearing.  She has component of night pain.  Symptoms disrupt her desired activities of daily living.  She is here today requesting consideration of total knee replacement arthroplasty on the left side.  She sees Dr. Sutton for her routine medical needs.  She has never had an MI or CVA.  She does have history of sleep apnea and uses a CPAP and  oxygen at home.  She is not on the anticoagulation       Past Medical History:   Diagnosis Date    Arthritis     COPD (chronic obstructive pulmonary disease)     Disorder of kidney and ureter     Hypertension     Liver disease     Osteoarthritis     Seizures     Sleep apnea        Past Surgical History:   Procedure Laterality Date    ABDOMINAL SURGERY      APPENDECTOMY      BREAST SURGERY      CHOLECYSTECTOMY      GANGLION CYST EXCISION Left     HYSTERECTOMY      KNEE ARTHROSCOPY      LAPAROSCOPY      lithrotripsy      MYRINGOTOMY W/ TUBES Left     TONSILLECTOMY      TRIGGER FINGER RELEASE Right        Review of patient's allergies indicates:   Allergen Reactions    Adhesive tape-silicones     Demerol [meperidine]     Erythromycin     Prozac [fluoxetine]     Tetracyclines        No current facility-administered medications on file prior to encounter.     Current Outpatient Medications on File Prior to Encounter   Medication Sig    carBAMazepine (TEGRETOL XR) 100 MG 12 hr tablet Take 200 mg by mouth once daily. Every am    carBAMazepine (TEGRETOL XR) 100 MG 12 hr tablet Take 100 mg by mouth once daily. At lunch time    clotrimazole-betamethasone 1-0.05% (LOTRISONE) cream     ergocalciferol (ERGOCALCIFEROL) 50,000 unit Cap Take 50,000 Units by mouth every 7 days.    EScitalopram oxalate (LEXAPRO) 20 MG tablet Take 20 mg by mouth once daily.    febuxostat (ULORIC) 40 mg Tab Take 40 mg by mouth once daily.    ibuprofen (ADVIL,MOTRIN) 800 MG tablet Take 800 mg by mouth every 6 (six) hours as needed.    magnesium oxide (MAG-OX) 400 mg (241.3 mg magnesium) tablet Take 400 mg by mouth 2 (two) times daily.    phentermine (ADIPEX-P) 37.5 mg tablet     potassium chloride SA (K-DUR,KLOR-CON) 20 MEQ tablet     tiZANidine (ZANAFLEX) 4 MG tablet Take 4 mg by mouth once daily.    zafirlukast (ACCOLATE) 20 MG tablet Take 20 mg by mouth 2 (two) times daily.    [DISCONTINUED] fluconazole  (DIFLUCAN) 150 MG Tab     azithromycin (Z-SHORTY) 250 MG tablet     carBAMazepine (TEGRETOL XR) 100 MG 12 hr tablet Take 200 mg by mouth nightly.    dexAMETHasone (DECADRON) 4 MG Tab     diphenoxylate-atropine 2.5-0.025 mg (LOMOTIL) 2.5-0.025 mg per tablet     estradiol valerate (DELESTROGEN) 20 mg/mL injection     hydrOXYchloroQUINE (PLAQUENIL) 200 mg tablet     metroNIDAZOLE (FLAGYL) 500 MG tablet     mupirocin (BACTROBAN) 2 % ointment     ORENCIA CLICKJECT 125 mg/mL AtIn once a week.    oxyCODONE-acetaminophen (PERCOCET)  mg per tablet     triamcinolone acetonide 0.5% (KENALOG) 0.5 % Crea     VITAMIN D3 25 mcg (1,000 unit) tablet     XARELTO 10 mg Tab Patient states is no longer taking Xarelto     Family History    None       Tobacco Use    Smoking status: Former Smoker    Smokeless tobacco: Never Used   Substance and Sexual Activity    Alcohol use: Not Currently    Drug use: Not Currently    Sexual activity: Not on file     Review of Systems   Constitutional: Positive for activity change. Negative for chills, diaphoresis and fever.   HENT: Negative for congestion.    Respiratory: Negative for chest tightness and shortness of breath.    Cardiovascular: Negative for chest pain.   Gastrointestinal: Negative for abdominal pain, constipation, diarrhea and nausea.   Musculoskeletal: Positive for gait problem.   Neurological: Negative for dizziness and light-headedness.   All other systems reviewed and are negative.    Objective:     Vital Signs (Most Recent):  Temp: 97.5 °F (36.4 °C) (12/07/21 1740)  Pulse: 85 (12/07/21 1825)  Resp: 20 (12/07/21 1825)  BP: (!) 125/58 (12/07/21 1825)  SpO2: 97 % (12/07/21 1825) Vital Signs (24h Range):  Temp:  [97.5 °F (36.4 °C)-98.9 °F (37.2 °C)] 97.5 °F (36.4 °C)  Pulse:  [77-90] 85  Resp:  [12-20] 20  SpO2:  [90 %-99 %] 97 %  BP: (112-147)/(47-71) 125/58     Weight: (!) 144.2 kg (318 lb)  Body mass index is 50.56 kg/m².    Physical Exam  Vitals and nursing note  reviewed.   Constitutional:       General: She is not in acute distress.     Appearance: She is obese.   HENT:      Head: Normocephalic and atraumatic.      Nose: Nose normal.      Mouth/Throat:      Mouth: Mucous membranes are moist.   Eyes:      Extraocular Movements: Extraocular movements intact.      Pupils: Pupils are equal, round, and reactive to light.   Cardiovascular:      Rate and Rhythm: Normal rate.      Pulses: Normal pulses.   Pulmonary:      Effort: Pulmonary effort is normal. No respiratory distress.      Breath sounds: No wheezing.   Abdominal:      Palpations: Abdomen is soft.      Tenderness: There is no abdominal tenderness.   Musculoskeletal:      Cervical back: Normal range of motion and neck supple.      Comments: S/p left knee arthroplasty. dsg d/i . Pt has motor,sensory function and pulses in all 4 extremities.     Skin:     General: Skin is warm and dry.      Capillary Refill: Capillary refill takes less than 2 seconds.   Neurological:      General: No focal deficit present.      Mental Status: She is alert and oriented to person, place, and time.      Cranial Nerves: No cranial nerve deficit.      Sensory: No sensory deficit.   Psychiatric:         Mood and Affect: Mood normal.         Behavior: Behavior normal.         Significant Labs: All pertinent labs within the past 24 hours have been reviewed.    Significant Imaging: I have reviewed all pertinent imaging results/findings within the past 24 hours.    Assessment/Plan:     Epilepsy    Chronic,resume home medications      HTN (hypertension)    Chronic,resume home medications    GAMAL (obstructive sleep apnea)    Non compliant with C- Pap. ON O2 via NC WEAR THRU NIGHT.    S/P total knee arthroplasty, left    - Orders per orthopedics specific to surgery   - SCD lower legs   - Anticoagulation  When ok Surgery   - PO Protonix daily for ulcer ppx   - Incentive spirometry hourly    - early ambulation    Severe obesity (BMI >= 40)    Body mass  index is 50.56 kg/m². Morbid obesity complicates all aspects of disease management from diagnostic modalities to treatment. Weight loss encouraged and health benefits explained to patient.      VTE Risk Mitigation (From admission, onward)         Ordered     apixaban tablet 2.5 mg  2 times daily         12/07/21 1556     IP VTE HIGH RISK PATIENT  Once         12/07/21 1556     Place ASH hose  Until discontinued         12/07/21 1556                    Thank you for your consult. I will follow-up with patient. Please contact us if you have any additional questions.    Lanre Villalta, JENNIFER  Department of Hospital Medicine   [facility]

## 2021-12-08 NOTE — SUBJECTIVE & OBJECTIVE
Interval History: NAEO. Pt tolerates diet.Denies CP,AFEBRILE.    Review of Systems   Constitutional: Positive for activity change. Negative for chills, diaphoresis and fever.   HENT: Negative for congestion.    Respiratory: Negative for chest tightness and shortness of breath.    Cardiovascular: Negative for chest pain.   Gastrointestinal: Negative for abdominal pain, constipation, diarrhea and nausea.   Musculoskeletal: Positive for gait problem.   Neurological: Negative for dizziness and light-headedness.   All other systems reviewed and are negative.    Objective:     Vital Signs (Most Recent):  Temp: 98.5 °F (36.9 °C) (12/08/21 0412)  Pulse: 67 (12/08/21 0412)  Resp: 18 (12/08/21 0958)  BP: (!) 160/77 (12/08/21 0412)  SpO2: 95 % (12/08/21 0412) Vital Signs (24h Range):  Temp:  [97.5 °F (36.4 °C)-98.9 °F (37.2 °C)] 98.5 °F (36.9 °C)  Pulse:  [67-90] 67  Resp:  [12-20] 18  SpO2:  [90 %-99 %] 95 %  BP: (112-160)/(47-77) 160/77     Weight: (!) 144.2 kg (318 lb)  Body mass index is 50.56 kg/m².    Intake/Output Summary (Last 24 hours) at 12/8/2021 1039  Last data filed at 12/8/2021 0500  Gross per 24 hour   Intake 1990 ml   Output 240 ml   Net 1750 ml      Physical Exam  Vitals and nursing note reviewed.   Constitutional:       General: She is not in acute distress.     Appearance: She is obese.   HENT:      Head: Normocephalic and atraumatic.      Nose: Nose normal.      Mouth/Throat:      Mouth: Mucous membranes are moist.   Eyes:      Extraocular Movements: Extraocular movements intact.      Pupils: Pupils are equal, round, and reactive to light.   Cardiovascular:      Rate and Rhythm: Normal rate.      Pulses: Normal pulses.   Pulmonary:      Effort: Pulmonary effort is normal. No respiratory distress.      Breath sounds: No wheezing.   Abdominal:      Palpations: Abdomen is soft.      Tenderness: There is no abdominal tenderness.   Musculoskeletal:      Cervical back: Normal range of motion and neck supple.       Comments: S/p left knee arthroplasty. dsg d/i . Pt has motor,sensory function and pulses in all 4 extremities.     Skin:     General: Skin is warm and dry.      Capillary Refill: Capillary refill takes less than 2 seconds.   Neurological:      General: No focal deficit present.      Mental Status: She is alert and oriented to person, place, and time.      Cranial Nerves: No cranial nerve deficit.      Sensory: No sensory deficit.   Psychiatric:         Mood and Affect: Mood normal.         Behavior: Behavior normal.         Significant Labs: All pertinent labs within the past 24 hours have been reviewed.    Significant Imaging: I have reviewed all pertinent imaging results/findings within the past 24 hours.

## 2021-12-08 NOTE — SUBJECTIVE & OBJECTIVE
Past Medical History:   Diagnosis Date    Arthritis     COPD (chronic obstructive pulmonary disease)     Disorder of kidney and ureter     Hypertension     Liver disease     Osteoarthritis     Seizures     Sleep apnea        Past Surgical History:   Procedure Laterality Date    ABDOMINAL SURGERY      APPENDECTOMY      BREAST SURGERY      CHOLECYSTECTOMY      GANGLION CYST EXCISION Left     HYSTERECTOMY      KNEE ARTHROSCOPY      LAPAROSCOPY      lithrotripsy      MYRINGOTOMY W/ TUBES Left     TONSILLECTOMY      TRIGGER FINGER RELEASE Right        Review of patient's allergies indicates:   Allergen Reactions    Adhesive tape-silicones     Demerol [meperidine]     Erythromycin     Prozac [fluoxetine]     Tetracyclines        No current facility-administered medications on file prior to encounter.     Current Outpatient Medications on File Prior to Encounter   Medication Sig    carBAMazepine (TEGRETOL XR) 100 MG 12 hr tablet Take 200 mg by mouth once daily. Every am    carBAMazepine (TEGRETOL XR) 100 MG 12 hr tablet Take 100 mg by mouth once daily. At lunch time    clotrimazole-betamethasone 1-0.05% (LOTRISONE) cream     ergocalciferol (ERGOCALCIFEROL) 50,000 unit Cap Take 50,000 Units by mouth every 7 days.    EScitalopram oxalate (LEXAPRO) 20 MG tablet Take 20 mg by mouth once daily.    febuxostat (ULORIC) 40 mg Tab Take 40 mg by mouth once daily.    ibuprofen (ADVIL,MOTRIN) 800 MG tablet Take 800 mg by mouth every 6 (six) hours as needed.    magnesium oxide (MAG-OX) 400 mg (241.3 mg magnesium) tablet Take 400 mg by mouth 2 (two) times daily.    phentermine (ADIPEX-P) 37.5 mg tablet     potassium chloride SA (K-DUR,KLOR-CON) 20 MEQ tablet     tiZANidine (ZANAFLEX) 4 MG tablet Take 4 mg by mouth once daily.    zafirlukast (ACCOLATE) 20 MG tablet Take 20 mg by mouth 2 (two) times daily.    [DISCONTINUED] fluconazole (DIFLUCAN) 150 MG Tab     azithromycin (Z-SHORTY) 250 MG tablet      carBAMazepine (TEGRETOL XR) 100 MG 12 hr tablet Take 200 mg by mouth nightly.    dexAMETHasone (DECADRON) 4 MG Tab     diphenoxylate-atropine 2.5-0.025 mg (LOMOTIL) 2.5-0.025 mg per tablet     estradiol valerate (DELESTROGEN) 20 mg/mL injection     hydrOXYchloroQUINE (PLAQUENIL) 200 mg tablet     metroNIDAZOLE (FLAGYL) 500 MG tablet     mupirocin (BACTROBAN) 2 % ointment     ORENCIA CLICKJECT 125 mg/mL AtIn once a week.    oxyCODONE-acetaminophen (PERCOCET)  mg per tablet     triamcinolone acetonide 0.5% (KENALOG) 0.5 % Crea     VITAMIN D3 25 mcg (1,000 unit) tablet     XARELTO 10 mg Tab Patient states is no longer taking Xarelto     Family History    None       Tobacco Use    Smoking status: Former Smoker    Smokeless tobacco: Never Used   Substance and Sexual Activity    Alcohol use: Not Currently    Drug use: Not Currently    Sexual activity: Not on file     Review of Systems   Constitutional: Positive for activity change. Negative for chills, diaphoresis and fever.   HENT: Negative for congestion.    Respiratory: Negative for chest tightness and shortness of breath.    Cardiovascular: Negative for chest pain.   Gastrointestinal: Negative for abdominal pain, constipation, diarrhea and nausea.   Musculoskeletal: Positive for gait problem.   Neurological: Negative for dizziness and light-headedness.   All other systems reviewed and are negative.    Objective:     Vital Signs (Most Recent):  Temp: 97.5 °F (36.4 °C) (12/07/21 1740)  Pulse: 85 (12/07/21 1825)  Resp: 20 (12/07/21 1825)  BP: (!) 125/58 (12/07/21 1825)  SpO2: 97 % (12/07/21 1825) Vital Signs (24h Range):  Temp:  [97.5 °F (36.4 °C)-98.9 °F (37.2 °C)] 97.5 °F (36.4 °C)  Pulse:  [77-90] 85  Resp:  [12-20] 20  SpO2:  [90 %-99 %] 97 %  BP: (112-147)/(47-71) 125/58     Weight: (!) 144.2 kg (318 lb)  Body mass index is 50.56 kg/m².    Physical Exam  Vitals and nursing note reviewed.   Constitutional:       General: She is not in acute  distress.     Appearance: She is obese.   HENT:      Head: Normocephalic and atraumatic.      Nose: Nose normal.      Mouth/Throat:      Mouth: Mucous membranes are moist.   Eyes:      Extraocular Movements: Extraocular movements intact.      Pupils: Pupils are equal, round, and reactive to light.   Cardiovascular:      Rate and Rhythm: Normal rate.      Pulses: Normal pulses.   Pulmonary:      Effort: Pulmonary effort is normal. No respiratory distress.      Breath sounds: No wheezing.   Abdominal:      Palpations: Abdomen is soft.      Tenderness: There is no abdominal tenderness.   Musculoskeletal:      Cervical back: Normal range of motion and neck supple.      Comments: S/p left knee arthroplasty. dsg d/i . Pt has motor,sensory function and pulses in all 4 extremities.     Skin:     General: Skin is warm and dry.      Capillary Refill: Capillary refill takes less than 2 seconds.   Neurological:      General: No focal deficit present.      Mental Status: She is alert and oriented to person, place, and time.      Cranial Nerves: No cranial nerve deficit.      Sensory: No sensory deficit.   Psychiatric:         Mood and Affect: Mood normal.         Behavior: Behavior normal.         Significant Labs: All pertinent labs within the past 24 hours have been reviewed.    Significant Imaging: I have reviewed all pertinent imaging results/findings within the past 24 hours.

## 2021-12-08 NOTE — PLAN OF CARE
Problem: Physical Therapy Goal  Goal: Physical Therapy Goal  Description: Short Term Goals  Independent with HEP  Independent with walkerx 100 feet FWB/WBAT: left lower extremity  0-100 knee flexion to allow normal sit to stand    Long term goals  Needed equipment for home.       Outcome: Ongoing, Progressing

## 2021-12-08 NOTE — PLAN OF CARE
Problem: Occupational Therapy Goal  Goal: Occupational Therapy Goal  Description: ST.Pt will perform bathing with min a  2.Pt will perform UE dressing (I)  3.Pt will perform LE dressing with min a with AD as needed  4.Pt will transfer bed/chair/bsc with CGA  5.Pt will perform standing task x 2 min with CGA   6.Tolerate 15 min of tx without fatigue.      LTG:   Restore to max I with selfcare and mobility.     Outcome: Ongoing, Progressing

## 2021-12-08 NOTE — NURSING
[facility] to [facility]; transported per family to [id number]

## 2021-12-08 NOTE — HPI
52 y/o female  s/p left knee arthroplasty due to Arthritis.Pt is seen in consult for medical management during this admit. Pt followed by  for bilateral knee pain with worsening of left knee. Pt has failed conservative treatment including injections w/o relief of symptoms.Pt reports pain and weakness within her bilateral knees that affects her activity of daily living. She reports pain with weight bearing especially her left knee and has is at a increased risk of falls.Upon exam pt awakes easily and answers questions. Pt denies SOB or chest pain.Thanks for consult. Will follow while in hospital.                       .  Left knee is more symptomatic than the right.  Symptoms began insidiously 10 years or more in a been shoulder progressive over time.  She reported has been diagnosed with psoriatic arthritis.  She sees rheumatologist Dr. Masters in [place].  She has had periodic intra-articular steroid injections as well as viscosupplementation injections.  She last had corticosteroid injections both knees 08/19/2021.  She now only deriving 2-3 days improvement in symptoms.  Her pain is worse with weight-bearing.  She has component of night pain.  Symptoms disrupt her desired activities of daily living.  She is here today requesting consideration of total knee replacement arthroplasty on the left side.  She sees Dr. Sutton for her routine medical needs.  She has never had an MI or CVA.  She does have history of sleep apnea and uses a CPAP and oxygen at home.  She is not on the anticoagulation

## 2021-12-08 NOTE — PROGRESS NOTES
[facility] Medicine  Progress Note    Patient Name: Marian Foster  MRN: [id number]  Patient Class: IP- Surgery Admit   Admission Date: 12/7/2021  Length of Stay: 1 days  Attending Physician: Gaurang Toussaint MD  Primary Care Provider: Primary Doctor No        Subjective:     Principal Problem:Primary osteoarthritis of left knee        HPI:    52 y/o female  s/p left knee arthroplasty due to Arthritis.Pt is seen in consult for medical management during this admit. Pt followed by  for bilateral knee pain with worsening of left knee. Pt has failed conservative treatment including injections w/o relief of symptoms.Pt reports pain and weakness within her bilateral knees that affects her activity of daily living. She reports pain with weight bearing especially her left knee and has is at a increased risk of falls.Upon exam pt awakes easily and answers questions. Pt denies SOB or chest pain.Thanks for consult. Will follow while in hospital.                       .  Left knee is more symptomatic than the right.  Symptoms began insidiously 10 years or more in a been shoulder progressive over time.  She reported has been diagnosed with psoriatic arthritis.  She sees rheumatologist Dr. Masters in [place].  She has had periodic intra-articular steroid injections as well as viscosupplementation injections.  She last had corticosteroid injections both knees 08/19/2021.  She now only deriving 2-3 days improvement in symptoms.  Her pain is worse with weight-bearing.  She has component of night pain.  Symptoms disrupt her desired activities of daily living.  She is here today requesting consideration of total knee replacement arthroplasty on the left side.  She sees Dr. Sutton for her routine medical needs.  She has never had an MI or CVA.  She does have history of sleep apnea and uses a CPAP and oxygen at home.  She is not on the anticoagulation       Overview/Hospital Course:  12/8/21  -  Progressing well P/O day#1  - SS working on SWB placement.  - Family at bedside.      Interval History: NAEO. Pt tolerates diet.Denies CP,AFEBRILE.    Review of Systems   Constitutional: Positive for activity change. Negative for chills, diaphoresis and fever.   HENT: Negative for congestion.    Respiratory: Negative for chest tightness and shortness of breath.    Cardiovascular: Negative for chest pain.   Gastrointestinal: Negative for abdominal pain, constipation, diarrhea and nausea.   Musculoskeletal: Positive for gait problem.   Neurological: Negative for dizziness and light-headedness.   All other systems reviewed and are negative.    Objective:     Vital Signs (Most Recent):  Temp: 98.5 °F (36.9 °C) (12/08/21 0412)  Pulse: 67 (12/08/21 0412)  Resp: 18 (12/08/21 0958)  BP: (!) 160/77 (12/08/21 0412)  SpO2: 95 % (12/08/21 0412) Vital Signs (24h Range):  Temp:  [97.5 °F (36.4 °C)-98.9 °F (37.2 °C)] 98.5 °F (36.9 °C)  Pulse:  [67-90] 67  Resp:  [12-20] 18  SpO2:  [90 %-99 %] 95 %  BP: (112-160)/(47-77) 160/77     Weight: (!) 144.2 kg (318 lb)  Body mass index is 50.56 kg/m².    Intake/Output Summary (Last 24 hours) at 12/8/2021 1039  Last data filed at 12/8/2021 0500  Gross per 24 hour   Intake 1990 ml   Output 240 ml   Net 1750 ml      Physical Exam  Vitals and nursing note reviewed.   Constitutional:       General: She is not in acute distress.     Appearance: She is obese.   HENT:      Head: Normocephalic and atraumatic.      Nose: Nose normal.      Mouth/Throat:      Mouth: Mucous membranes are moist.   Eyes:      Extraocular Movements: Extraocular movements intact.      Pupils: Pupils are equal, round, and reactive to light.   Cardiovascular:      Rate and Rhythm: Normal rate.      Pulses: Normal pulses.   Pulmonary:      Effort: Pulmonary effort is normal. No respiratory distress.      Breath sounds: No wheezing.   Abdominal:      Palpations: Abdomen is soft.      Tenderness: There is no abdominal  tenderness.   Musculoskeletal:      Cervical back: Normal range of motion and neck supple.      Comments: S/p left knee arthroplasty. dsg d/i . Pt has motor,sensory function and pulses in all 4 extremities.     Skin:     General: Skin is warm and dry.      Capillary Refill: Capillary refill takes less than 2 seconds.   Neurological:      General: No focal deficit present.      Mental Status: She is alert and oriented to person, place, and time.      Cranial Nerves: No cranial nerve deficit.      Sensory: No sensory deficit.   Psychiatric:         Mood and Affect: Mood normal.         Behavior: Behavior normal.         Significant Labs: All pertinent labs within the past 24 hours have been reviewed.    Significant Imaging: I have reviewed all pertinent imaging results/findings within the past 24 hours.      Assessment/Plan:      Epilepsy    Chronic,resume home medications      HTN (hypertension)    Chronic,resume home medications    GAMAL (obstructive sleep apnea)    Non compliant with C- Pap. ON O2 via NC WEAR THRU NIGHT.    S/P total knee arthroplasty, left    - Orders per orthopedics specific to surgery   - SCD lower legs   - Anticoagulation  When ok Surgery   - PO Protonix daily for ulcer ppx   - Incentive spirometry hourly    - early ambulation  12/8/21  - Progressing well P/O day#1  - SS working on SWB placement.    Severe obesity (BMI >= 40)    Body mass index is 50.56 kg/m². Morbid obesity complicates all aspects of disease management from diagnostic modalities to treatment. Weight loss encouraged and health benefits explained to patient.      VTE Risk Mitigation (From admission, onward)         Ordered     apixaban tablet 5 mg  2 times daily         12/08/21 1237     IP VTE HIGH RISK PATIENT  Once         12/08/21 0842     Place ASH hose  Until discontinued         12/08/21 0842     Place ASH hose  Until discontinued         12/07/21 1556                Discharge Planning   KAROL: 12/8/2021     Code Status:  Prior   Is the patient medically ready for discharge?:     Reason for patient still in hospital (select all that apply): Treatment  Discharge Plan A: Rehab ([facility])   Discharge Delays: None known at this time              JENNIFER Ott  Department of Hospital Medicine   [facility] - Orthopedic

## 2021-12-08 NOTE — PLAN OF CARE
Problem: Adult Inpatient Plan of Care  Goal: Plan of Care Review  Outcome: Ongoing, Progressing  Goal: Patient-Specific Goal (Individualization)  Outcome: Ongoing, Progressing  Goal: Absence of Hospital-Acquired Illness or Injury  Outcome: Ongoing, Progressing  Goal: Optimal Comfort and Wellbeing  Outcome: Ongoing, Progressing  Goal: Readiness for Transition of Care  Outcome: Ongoing, Progressing  Goal: Rounds/Family Conference  Outcome: Ongoing, Progressing     Problem: Bariatric Environmental Safety  Goal: Safety Maintained with Care  Outcome: Ongoing, Progressing     Problem: Fall Injury Risk  Goal: Absence of Fall and Fall-Related Injury  Outcome: Ongoing, Progressing

## 2021-12-09 NOTE — PT/OT/SLP PROGRESS
"Physical Therapy Treatment    Patient Name:  Marian Foster   MRN:  [id number]    Recommendations:     Discharge Recommendations:  home,home with home health   Discharge Equipment Recommendations: 3-in-1 commode,walker, rolling   Barriers to discharge: None    Assessment:     Marian Foster is a 51 y.o. female admitted with a medical diagnosis of Primary osteoarthritis of left knee.  She presents with the following impairments/functional limitations:  impaired self care skills,impaired functional mobilty,pain,decreased lower extremity function . Pt displays performance deficits that are due to self limiting factors and mentality.    Rehab Prognosis: Good; patient would benefit from acute skilled PT services to address these deficits and reach maximum level of function.    Recent Surgery: Procedure(s) (LRB):  ARTHROPLASTY, KNEE, TOTAL, USING COMPUTER-ASSISTED NAVIGATION (Left) 1 Day Post-Op    Plan:     During this hospitalization, patient to be seen 5 x/week to address the identified rehab impairments via gait training,therapeutic activities,therapeutic exercises and progress toward the following goals:    · Plan of Care Expires:  01/07/22    Subjective     Chief Complaint: LLE post op  Patient/Family Comments/goals: "I have been up in the chair all morning and already walked to the bathroom do not make me get out of this bed"  Pain/Comfort:  · Pain Rating 1: 7/10  · Location - Side 1: Left  · Location 1: knee  · Pain Addressed 1: Distraction,Reposition      Objective:     Communicated with JASPAL Corral prior to session.  Patient found supine with knee immobilizer,peripheral IV,hemovac upon PT entry to room.     General Precautions: Standard, fall   Orthopedic Precautions:LLE weight bearing as tolerated   Braces: Knee immobilizer  Respiratory Status:  · Flow (L/min): 2  · Oxygen Concentration (%): 32  · O2 Device (Oxygen Therapy): nasal cannula     Functional Mobility:  · Bed Mobility:     · Rolling Left:  modified " independence  · Rolling Right: modified independence      AM-PAC 6 CLICK MOBILITY  Turning over in bed (including adjusting bedclothes, sheets and blankets)?: 3  Sitting down on and standing up from a chair with arms (e.g., wheelchair, bedside commode, etc.): 3  Moving from lying on back to sitting on the side of the bed?: 3  Moving to and from a bed to a chair (including a wheelchair)?: 3  Need to walk in hospital room?: 3  Climbing 3-5 steps with a railing?: 1  Basic Mobility Total Score: 16       Therapeutic Activities and Exercises:   Supine BLE ap, heel slides (AAROM), hip abd/add, AAROM, SLR (AAROM), QS 30x ea   Knee flexion measured at 52 PROM    Patient left supine with all lines intact, call button in reach and Shayna, Rn notified..    GOALS:   Multidisciplinary Problems     Physical Therapy Goals        Problem: Physical Therapy Goal    Goal Priority Disciplines Outcome Goal Variances Interventions   Physical Therapy Goal     PT, PT/OT Ongoing, Progressing     Description: Short Term Goals  Independent with HEP  Independent with walkerx 100 feet FWB/WBAT: left lower extremity  0-100 knee flexion to allow normal sit to stand    Long term goals  Needed equipment for home.                        Time Tracking:     PT Received On: 12/08/21  PT Start Time: 1218     PT Stop Time: 1249  PT Total Time (min): 31 min     Billable Minutes: Therapeutic Exercise 25    Treatment Type: Treatment  PT/PTA: PTA     PTA Visit Number: 1     12/08/2021

## 2021-12-27 PROBLEM — Z96.651 TOTAL KNEE REPLACEMENT STATUS, RIGHT: Status: ACTIVE | Noted: 2021-12-07

## 2022-01-18 ENCOUNTER — HOSPITAL ENCOUNTER (OUTPATIENT)
Dept: RADIOLOGY | Facility: HOSPITAL | Age: 52
Discharge: HOME | End: 2022-01-18
Attending: NURSE PRACTITIONER
Payer: MEDICARE

## 2022-01-18 DIAGNOSIS — Z47.89 ORTHOPEDIC AFTERCARE: ICD-10-CM

## 2022-01-18 PROBLEM — Z09 FOLLOW UP: Status: ACTIVE | Noted: 2022-01-18

## 2022-01-18 PROCEDURE — 73562 X-RAY EXAM OF KNEE 3: CPT | Mod: TC,LT

## 2022-01-18 PROCEDURE — 73562 X-RAY EXAM OF KNEE 3: CPT | Mod: 26,LT,, | Performed by: RADIOLOGY

## 2022-01-18 PROCEDURE — 73562 XR KNEE AP LAT WITH SUNRISE LEFT: ICD-10-PCS | Mod: 26,LT,, | Performed by: RADIOLOGY

## 2022-03-03 ENCOUNTER — HOSPITAL ENCOUNTER (OUTPATIENT)
Dept: RADIOLOGY | Facility: HOSPITAL | Age: 52
Discharge: HOME | End: 2022-03-03
Attending: NURSE PRACTITIONER
Payer: MEDICARE

## 2022-03-03 DIAGNOSIS — Z47.89 ORTHOPEDIC AFTERCARE: ICD-10-CM

## 2022-03-03 PROCEDURE — 73562 XR KNEE AP LAT WITH SUNRISE LEFT: ICD-10-PCS | Mod: 26,LT,, | Performed by: STUDENT IN AN ORGANIZED HEALTH CARE EDUCATION/TRAINING PROGRAM

## 2022-03-03 PROCEDURE — 73562 X-RAY EXAM OF KNEE 3: CPT | Mod: TC,LT

## 2022-03-03 PROCEDURE — 73562 X-RAY EXAM OF KNEE 3: CPT | Mod: 26,LT,, | Performed by: STUDENT IN AN ORGANIZED HEALTH CARE EDUCATION/TRAINING PROGRAM

## 2022-04-25 PROBLEM — Z09 FOLLOW UP: Status: RESOLVED | Noted: 2022-01-18 | Resolved: 2022-04-25

## 2022-05-11 PROBLEM — M17.11 PRIMARY OSTEOARTHRITIS OF RIGHT KNEE: Status: ACTIVE | Noted: 2022-05-11

## 2022-05-12 PROCEDURE — 85730 THROMBOPLASTIN TIME PARTIAL: CPT | Performed by: ORTHOPAEDIC SURGERY

## 2022-05-12 PROCEDURE — 85610 PROTHROMBIN TIME: CPT | Performed by: ORTHOPAEDIC SURGERY

## 2022-05-19 ENCOUNTER — ANESTHESIA EVENT (OUTPATIENT)
Dept: SURGERY | Facility: HOSPITAL | Age: 52
End: 2022-05-19
Payer: MEDICARE

## 2022-05-19 ENCOUNTER — HOSPITAL ENCOUNTER (OUTPATIENT)
Facility: HOSPITAL | Age: 52
Discharge: HOME HEALTH | End: 2022-05-20
Attending: ORTHOPAEDIC SURGERY | Admitting: ORTHOPAEDIC SURGERY
Payer: MEDICARE

## 2022-05-19 ENCOUNTER — ANESTHESIA (OUTPATIENT)
Dept: SURGERY | Facility: HOSPITAL | Age: 52
End: 2022-05-19
Payer: MEDICARE

## 2022-05-19 DIAGNOSIS — I10 PRIMARY HYPERTENSION: Chronic | ICD-10-CM

## 2022-05-19 DIAGNOSIS — T88.4XXA HARD TO INTUBATE, INITIAL ENCOUNTER: Primary | ICD-10-CM

## 2022-05-19 DIAGNOSIS — M17.0 PRIMARY OSTEOARTHRITIS OF BOTH KNEES: Primary | Chronic | ICD-10-CM

## 2022-05-19 DIAGNOSIS — M17.11 PRIMARY OSTEOARTHRITIS OF RIGHT KNEE: ICD-10-CM

## 2022-05-19 DIAGNOSIS — G47.33 OSA (OBSTRUCTIVE SLEEP APNEA): Chronic | ICD-10-CM

## 2022-05-19 DIAGNOSIS — G40.802 OTHER EPILEPSY WITHOUT STATUS EPILEPTICUS, NOT INTRACTABLE: Chronic | ICD-10-CM

## 2022-05-19 DIAGNOSIS — R52 UNCONTROLLED PAIN: ICD-10-CM

## 2022-05-19 LAB
BASOPHILS # BLD AUTO: 0.04 K/UL (ref 0–0.2)
BASOPHILS NFR BLD AUTO: 0.2 % (ref 0–1)
DIFFERENTIAL METHOD BLD: ABNORMAL
EOSINOPHIL # BLD AUTO: 0 K/UL (ref 0–0.5)
EOSINOPHIL NFR BLD AUTO: 0 % (ref 1–4)
ERYTHROCYTE [DISTWIDTH] IN BLOOD BY AUTOMATED COUNT: 13.2 % (ref 11.5–14.5)
HCT VFR BLD AUTO: 37.9 % (ref 38–47)
HGB BLD-MCNC: 12.4 G/DL (ref 12–16)
IMM GRANULOCYTES # BLD AUTO: 0.09 K/UL (ref 0–0.04)
IMM GRANULOCYTES NFR BLD: 0.5 % (ref 0–0.4)
LYMPHOCYTES # BLD AUTO: 1.35 K/UL (ref 1–4.8)
LYMPHOCYTES NFR BLD AUTO: 7.5 % (ref 27–41)
LYMPHOCYTES NFR BLD MANUAL: 7 % (ref 27–41)
MCH RBC QN AUTO: 29.2 PG (ref 27–31)
MCHC RBC AUTO-ENTMCNC: 32.7 G/DL (ref 32–36)
MCV RBC AUTO: 89.4 FL (ref 80–96)
MONOCYTES # BLD AUTO: 0.31 K/UL (ref 0–0.8)
MONOCYTES NFR BLD AUTO: 1.7 % (ref 2–6)
MONOCYTES NFR BLD MANUAL: 2 % (ref 2–6)
MPC BLD CALC-MCNC: 10 FL (ref 9.4–12.4)
NEUTROPHILS # BLD AUTO: 16.24 K/UL (ref 1.8–7.7)
NEUTROPHILS NFR BLD AUTO: 90.1 % (ref 53–65)
NEUTS BAND NFR BLD MANUAL: 1 % (ref 1–5)
NEUTS SEG NFR BLD MANUAL: 90 % (ref 50–62)
NRBC # BLD AUTO: 0 X10E3/UL
NRBC, AUTO (.00): 0 %
PLATELET # BLD AUTO: 309 K/UL (ref 150–400)
PLATELET MORPHOLOGY: ABNORMAL
RBC # BLD AUTO: 4.24 M/UL (ref 4.2–5.4)
RBC MORPH BLD: NORMAL
WBC # BLD AUTO: 18.03 K/UL (ref 4.5–11)

## 2022-05-19 PROCEDURE — 25000003 PHARM REV CODE 250: Performed by: ORTHOPAEDIC SURGERY

## 2022-05-19 PROCEDURE — D9220A PRA ANESTHESIA: Mod: ANES,,, | Performed by: ANESTHESIOLOGY

## 2022-05-19 PROCEDURE — 86901 BLOOD TYPING SEROLOGIC RH(D): CPT | Performed by: ORTHOPAEDIC SURGERY

## 2022-05-19 PROCEDURE — 36000712 HC OR TIME LEV V 1ST 15 MIN: Performed by: ORTHOPAEDIC SURGERY

## 2022-05-19 PROCEDURE — C1713 ANCHOR/SCREW BN/BN,TIS/BN: HCPCS | Performed by: ORTHOPAEDIC SURGERY

## 2022-05-19 PROCEDURE — C1776 JOINT DEVICE (IMPLANTABLE): HCPCS | Performed by: ORTHOPAEDIC SURGERY

## 2022-05-19 PROCEDURE — 97162 PT EVAL MOD COMPLEX 30 MIN: CPT

## 2022-05-19 PROCEDURE — 64447 PERIPHERAL BLOCK: ICD-10-PCS | Mod: XU,RT,, | Performed by: ANESTHESIOLOGY

## 2022-05-19 PROCEDURE — 97165 OT EVAL LOW COMPLEX 30 MIN: CPT

## 2022-05-19 PROCEDURE — 36415 COLL VENOUS BLD VENIPUNCTURE: CPT | Performed by: ORTHOPAEDIC SURGERY

## 2022-05-19 PROCEDURE — 99900031 HC PATIENT EDUCATION (STAT)

## 2022-05-19 PROCEDURE — 37000009 HC ANESTHESIA EA ADD 15 MINS: Performed by: ORTHOPAEDIC SURGERY

## 2022-05-19 PROCEDURE — 63600175 PHARM REV CODE 636 W HCPCS: Performed by: ORTHOPAEDIC SURGERY

## 2022-05-19 PROCEDURE — 63600175 PHARM REV CODE 636 W HCPCS: Performed by: ANESTHESIOLOGY

## 2022-05-19 PROCEDURE — 63600175 PHARM REV CODE 636 W HCPCS

## 2022-05-19 PROCEDURE — 64447 NJX AA&/STRD FEMORAL NRV IMG: CPT | Mod: XU,RT,, | Performed by: ANESTHESIOLOGY

## 2022-05-19 PROCEDURE — 27200750 HC INSULATED NEEDLE/ STIMUPLEX: Performed by: ANESTHESIOLOGY

## 2022-05-19 PROCEDURE — 71000015 HC POSTOP RECOV 1ST HR: Performed by: ORTHOPAEDIC SURGERY

## 2022-05-19 PROCEDURE — 27000716 HC OXISENSOR PROBE, ANY SIZE: Performed by: ANESTHESIOLOGY

## 2022-05-19 PROCEDURE — C1769 GUIDE WIRE: HCPCS | Performed by: ORTHOPAEDIC SURGERY

## 2022-05-19 PROCEDURE — 27000165 HC TUBE, ETT CUFFED: Performed by: ANESTHESIOLOGY

## 2022-05-19 PROCEDURE — D9220A PRA ANESTHESIA: ICD-10-PCS | Mod: ANES,,, | Performed by: ANESTHESIOLOGY

## 2022-05-19 PROCEDURE — 27000655: Performed by: ANESTHESIOLOGY

## 2022-05-19 PROCEDURE — 27201423 OPTIME MED/SURG SUP & DEVICES STERILE SUPPLY: Performed by: ORTHOPAEDIC SURGERY

## 2022-05-19 PROCEDURE — 94761 N-INVAS EAR/PLS OXIMETRY MLT: CPT

## 2022-05-19 PROCEDURE — 37000008 HC ANESTHESIA 1ST 15 MINUTES: Performed by: ORTHOPAEDIC SURGERY

## 2022-05-19 PROCEDURE — 71000033 HC RECOVERY, INTIAL HOUR: Performed by: ORTHOPAEDIC SURGERY

## 2022-05-19 PROCEDURE — 27000510 HC BLANKET BAIR HUGGER ANY SIZE: Performed by: ANESTHESIOLOGY

## 2022-05-19 PROCEDURE — 27000509 HC TUBE SALEM SUMP ANY SIZE: Performed by: ANESTHESIOLOGY

## 2022-05-19 PROCEDURE — 27201480 HC GLIDESCOPE: Performed by: ANESTHESIOLOGY

## 2022-05-19 PROCEDURE — 85025 COMPLETE CBC W/AUTO DIFF WBC: CPT | Performed by: ORTHOPAEDIC SURGERY

## 2022-05-19 PROCEDURE — 71000016 HC POSTOP RECOV ADDL HR: Performed by: ORTHOPAEDIC SURGERY

## 2022-05-19 PROCEDURE — 99900035 HC TECH TIME PER 15 MIN (STAT)

## 2022-05-19 PROCEDURE — D9220A PRA ANESTHESIA: ICD-10-PCS | Mod: CRNA,,,

## 2022-05-19 PROCEDURE — 27000221 HC OXYGEN, UP TO 24 HOURS

## 2022-05-19 PROCEDURE — D9220A PRA ANESTHESIA: Mod: CRNA,,,

## 2022-05-19 PROCEDURE — 27000260 *HC AIRWAY ORAL: Performed by: ANESTHESIOLOGY

## 2022-05-19 PROCEDURE — 25000003 PHARM REV CODE 250

## 2022-05-19 PROCEDURE — 36000713 HC OR TIME LEV V EA ADD 15 MIN: Performed by: ORTHOPAEDIC SURGERY

## 2022-05-19 PROCEDURE — 27000689 HC BLADE LARYNGOSCOPE ANY SIZE: Performed by: ANESTHESIOLOGY

## 2022-05-19 PROCEDURE — 27100168 OPTIME MED/SURG SUP & DEVICES NON-STERILE SUPPLY: Performed by: ORTHOPAEDIC SURGERY

## 2022-05-19 DEVICE — IMP PATELLA ASYMMETRIC A32 10MM: Type: IMPLANTABLE DEVICE | Site: KNEE | Status: FUNCTIONAL

## 2022-05-19 DEVICE — IMP COMP TIB TRIATHLON SZ5: Type: IMPLANTABLE DEVICE | Site: KNEE | Status: FUNCTIONAL

## 2022-05-19 DEVICE — IMP INSERT TIBIAL BEARING 5X11MM: Type: IMPLANTABLE DEVICE | Site: KNEE | Status: FUNCTIONAL

## 2022-05-19 DEVICE — CEMENT BONE SMARTSET HV 40G: Type: IMPLANTABLE DEVICE | Site: KNEE | Status: FUNCTIONAL

## 2022-05-19 DEVICE — IMPLANTABLE DEVICE: Type: IMPLANTABLE DEVICE | Site: KNEE | Status: FUNCTIONAL

## 2022-05-19 RX ORDER — DIPHENHYDRAMINE HYDROCHLORIDE 50 MG/ML
25 INJECTION INTRAMUSCULAR; INTRAVENOUS EVERY 6 HOURS PRN
Status: DISCONTINUED | OUTPATIENT
Start: 2022-05-19 | End: 2022-05-19 | Stop reason: HOSPADM

## 2022-05-19 RX ORDER — FENTANYL CITRATE 50 UG/ML
INJECTION, SOLUTION INTRAMUSCULAR; INTRAVENOUS
Status: DISCONTINUED | OUTPATIENT
Start: 2022-05-19 | End: 2022-05-19

## 2022-05-19 RX ORDER — HYDROCODONE BITARTRATE AND ACETAMINOPHEN 10; 325 MG/1; MG/1
1 TABLET ORAL EVERY 4 HOURS PRN
Status: DISCONTINUED | OUTPATIENT
Start: 2022-05-19 | End: 2022-05-20 | Stop reason: HOSPADM

## 2022-05-19 RX ORDER — PHENYLEPHRINE HYDROCHLORIDE 10 MG/ML
INJECTION INTRAVENOUS
Status: DISCONTINUED | OUTPATIENT
Start: 2022-05-19 | End: 2022-05-19

## 2022-05-19 RX ORDER — ROPIVACAINE HYDROCHLORIDE 7.5 MG/ML
INJECTION, SOLUTION EPIDURAL; PERINEURAL
Status: COMPLETED | OUTPATIENT
Start: 2022-05-19 | End: 2022-05-19

## 2022-05-19 RX ORDER — HYDROMORPHONE HYDROCHLORIDE 2 MG/ML
0.5 INJECTION, SOLUTION INTRAMUSCULAR; INTRAVENOUS; SUBCUTANEOUS EVERY 5 MIN PRN
Status: COMPLETED | OUTPATIENT
Start: 2022-05-19 | End: 2022-05-19

## 2022-05-19 RX ORDER — LIDOCAINE HYDROCHLORIDE 20 MG/ML
INJECTION, SOLUTION EPIDURAL; INFILTRATION; INTRACAUDAL; PERINEURAL
Status: DISCONTINUED | OUTPATIENT
Start: 2022-05-19 | End: 2022-05-19

## 2022-05-19 RX ORDER — KETAMINE HYDROCHLORIDE 50 MG/ML
INJECTION, SOLUTION INTRAMUSCULAR; INTRAVENOUS
Status: DISCONTINUED | OUTPATIENT
Start: 2022-05-19 | End: 2022-05-19

## 2022-05-19 RX ORDER — SODIUM CHLORIDE, SODIUM LACTATE, POTASSIUM CHLORIDE, CALCIUM CHLORIDE 600; 310; 30; 20 MG/100ML; MG/100ML; MG/100ML; MG/100ML
INJECTION, SOLUTION INTRAVENOUS CONTINUOUS
Status: DISCONTINUED | OUTPATIENT
Start: 2022-05-19 | End: 2022-05-20 | Stop reason: HOSPADM

## 2022-05-19 RX ORDER — TRANEXAMIC ACID 100 MG/ML
INJECTION, SOLUTION INTRAVENOUS
Status: DISCONTINUED | OUTPATIENT
Start: 2022-05-19 | End: 2022-05-19

## 2022-05-19 RX ORDER — MORPHINE SULFATE 8 MG/ML
4 INJECTION INTRAMUSCULAR; INTRAVENOUS; SUBCUTANEOUS EVERY 5 MIN PRN
Status: DISCONTINUED | OUTPATIENT
Start: 2022-05-19 | End: 2022-05-19 | Stop reason: HOSPADM

## 2022-05-19 RX ORDER — ONDANSETRON 2 MG/ML
INJECTION INTRAMUSCULAR; INTRAVENOUS
Status: DISCONTINUED | OUTPATIENT
Start: 2022-05-19 | End: 2022-05-19

## 2022-05-19 RX ORDER — ONDANSETRON 2 MG/ML
4 INJECTION INTRAMUSCULAR; INTRAVENOUS EVERY 8 HOURS PRN
Status: DISCONTINUED | OUTPATIENT
Start: 2022-05-19 | End: 2022-05-20 | Stop reason: HOSPADM

## 2022-05-19 RX ORDER — LIDOCAINE HYDROCHLORIDE 10 MG/ML
1 INJECTION, SOLUTION EPIDURAL; INFILTRATION; INTRACAUDAL; PERINEURAL ONCE
Status: DISCONTINUED | OUTPATIENT
Start: 2022-05-19 | End: 2022-05-19 | Stop reason: HOSPADM

## 2022-05-19 RX ORDER — PROPOFOL 10 MG/ML
VIAL (ML) INTRAVENOUS
Status: DISCONTINUED | OUTPATIENT
Start: 2022-05-19 | End: 2022-05-19

## 2022-05-19 RX ORDER — MIDAZOLAM HYDROCHLORIDE 1 MG/ML
INJECTION INTRAMUSCULAR; INTRAVENOUS
Status: DISCONTINUED | OUTPATIENT
Start: 2022-05-19 | End: 2022-05-19

## 2022-05-19 RX ORDER — LANOLIN ALCOHOL/MO/W.PET/CERES
2000 CREAM (GRAM) TOPICAL DAILY
COMMUNITY

## 2022-05-19 RX ORDER — IBUPROFEN 800 MG/1
800 TABLET ORAL EVERY 6 HOURS PRN
COMMUNITY

## 2022-05-19 RX ORDER — ROCURONIUM BROMIDE 10 MG/ML
INJECTION, SOLUTION INTRAVENOUS
Status: DISCONTINUED | OUTPATIENT
Start: 2022-05-19 | End: 2022-05-19

## 2022-05-19 RX ORDER — CEFAZOLIN SODIUM 2 G/50ML
2 SOLUTION INTRAVENOUS
Status: DISPENSED | OUTPATIENT
Start: 2022-05-19 | End: 2022-05-20

## 2022-05-19 RX ORDER — MORPHINE SULFATE 8 MG/ML
INJECTION INTRAMUSCULAR; INTRAVENOUS; SUBCUTANEOUS
Status: DISCONTINUED | OUTPATIENT
Start: 2022-05-19 | End: 2022-05-19

## 2022-05-19 RX ORDER — ONDANSETRON 2 MG/ML
4 INJECTION INTRAMUSCULAR; INTRAVENOUS DAILY PRN
Status: DISCONTINUED | OUTPATIENT
Start: 2022-05-19 | End: 2022-05-19 | Stop reason: HOSPADM

## 2022-05-19 RX ORDER — MORPHINE SULFATE 8 MG/ML
4 INJECTION INTRAMUSCULAR; INTRAVENOUS; SUBCUTANEOUS EVERY 4 HOURS PRN
Status: DISCONTINUED | OUTPATIENT
Start: 2022-05-19 | End: 2022-05-20 | Stop reason: HOSPADM

## 2022-05-19 RX ORDER — CEFAZOLIN SODIUM 1 G/3ML
INJECTION, POWDER, FOR SOLUTION INTRAMUSCULAR; INTRAVENOUS
Status: DISCONTINUED | OUTPATIENT
Start: 2022-05-19 | End: 2022-05-19

## 2022-05-19 RX ORDER — DEXAMETHASONE SODIUM PHOSPHATE 4 MG/ML
INJECTION, SOLUTION INTRA-ARTICULAR; INTRALESIONAL; INTRAMUSCULAR; INTRAVENOUS; SOFT TISSUE
Status: DISCONTINUED | OUTPATIENT
Start: 2022-05-19 | End: 2022-05-19

## 2022-05-19 RX ORDER — BISACODYL 10 MG
10 SUPPOSITORY, RECTAL RECTAL DAILY PRN
Status: DISCONTINUED | OUTPATIENT
Start: 2022-05-19 | End: 2022-05-20 | Stop reason: HOSPADM

## 2022-05-19 RX ORDER — DEXAMETHASONE SODIUM PHOSPHATE 10 MG/ML
INJECTION INTRAMUSCULAR; INTRAVENOUS
Status: COMPLETED | OUTPATIENT
Start: 2022-05-19 | End: 2022-05-19

## 2022-05-19 RX ADMIN — SODIUM CHLORIDE, POTASSIUM CHLORIDE, SODIUM LACTATE AND CALCIUM CHLORIDE: 600; 310; 30; 20 INJECTION, SOLUTION INTRAVENOUS at 08:05

## 2022-05-19 RX ADMIN — PROPOFOL 50 MG: 10 INJECTION, EMULSION INTRAVENOUS at 09:05

## 2022-05-19 RX ADMIN — LIDOCAINE HYDROCHLORIDE 100 MG: 20 INJECTION, SOLUTION INTRAVENOUS at 08:05

## 2022-05-19 RX ADMIN — MIDAZOLAM 2 MG: 1 INJECTION INTRAMUSCULAR; INTRAVENOUS at 08:05

## 2022-05-19 RX ADMIN — DEXAMETHASONE SODIUM PHOSPHATE 8 MG: 4 INJECTION, SOLUTION INTRA-ARTICULAR; INTRALESIONAL; INTRAMUSCULAR; INTRAVENOUS; SOFT TISSUE at 08:05

## 2022-05-19 RX ADMIN — HYDROMORPHONE HYDROCHLORIDE 0.5 MG: 2 INJECTION, SOLUTION INTRAMUSCULAR; INTRAVENOUS; SUBCUTANEOUS at 11:05

## 2022-05-19 RX ADMIN — FENTANYL CITRATE 50 MCG: 50 INJECTION INTRAMUSCULAR; INTRAVENOUS at 09:05

## 2022-05-19 RX ADMIN — SODIUM CHLORIDE, POTASSIUM CHLORIDE, SODIUM LACTATE AND CALCIUM CHLORIDE: 600; 310; 30; 20 INJECTION, SOLUTION INTRAVENOUS at 03:05

## 2022-05-19 RX ADMIN — VANCOMYCIN HYDROCHLORIDE 2000 MG: 1 INJECTION, POWDER, LYOPHILIZED, FOR SOLUTION INTRAVENOUS at 08:05

## 2022-05-19 RX ADMIN — TRANEXAMIC ACID 1000 MG: 100 INJECTION, SOLUTION INTRAVENOUS at 09:05

## 2022-05-19 RX ADMIN — PROPOFOL 150 MG: 10 INJECTION, EMULSION INTRAVENOUS at 08:05

## 2022-05-19 RX ADMIN — CEFAZOLIN 3 G: 1 INJECTION, POWDER, FOR SOLUTION INTRAMUSCULAR; INTRAVENOUS; PARENTERAL at 08:05

## 2022-05-19 RX ADMIN — HYDROCODONE BITARTRATE AND ACETAMINOPHEN 1 TABLET: 10; 325 TABLET ORAL at 11:05

## 2022-05-19 RX ADMIN — KETAMINE HYDROCHLORIDE 70 MG: 50 INJECTION INTRAMUSCULAR; INTRAVENOUS at 09:05

## 2022-05-19 RX ADMIN — HYDROCODONE BITARTRATE AND ACETAMINOPHEN 1 TABLET: 10; 325 TABLET ORAL at 12:05

## 2022-05-19 RX ADMIN — PHENYLEPHRINE HYDROCHLORIDE 100 MCG: 10 INJECTION INTRAVENOUS at 09:05

## 2022-05-19 RX ADMIN — MORPHINE SULFATE 4 MG: 8 INJECTION INTRAVENOUS at 03:05

## 2022-05-19 RX ADMIN — ROPIVACAINE HYDROCHLORIDE 40 ML: 7.5 INJECTION, SOLUTION EPIDURAL; PERINEURAL at 08:05

## 2022-05-19 RX ADMIN — DEXAMETHASONE SODIUM PHOSPHATE 4 MG: 10 INJECTION, SOLUTION INTRAMUSCULAR; INTRAVENOUS at 08:05

## 2022-05-19 RX ADMIN — SUGAMMADEX 200 MG: 100 INJECTION, SOLUTION INTRAVENOUS at 10:05

## 2022-05-19 RX ADMIN — FENTANYL CITRATE 100 MCG: 50 INJECTION INTRAMUSCULAR; INTRAVENOUS at 08:05

## 2022-05-19 RX ADMIN — MORPHINE SULFATE 4 MG: 8 INJECTION INTRAVENOUS at 10:05

## 2022-05-19 RX ADMIN — ROCURONIUM BROMIDE 50 MG: 10 INJECTION, SOLUTION INTRAVENOUS at 08:05

## 2022-05-19 RX ADMIN — SODIUM CHLORIDE, POTASSIUM CHLORIDE, SODIUM LACTATE AND CALCIUM CHLORIDE: 600; 310; 30; 20 INJECTION, SOLUTION INTRAVENOUS at 10:05

## 2022-05-19 RX ADMIN — ROCURONIUM BROMIDE 30 MG: 10 INJECTION, SOLUTION INTRAVENOUS at 09:05

## 2022-05-19 RX ADMIN — PHENYLEPHRINE HYDROCHLORIDE 100 MCG: 10 INJECTION INTRAVENOUS at 10:05

## 2022-05-19 RX ADMIN — ONDANSETRON 4 MG: 2 INJECTION INTRAMUSCULAR; INTRAVENOUS at 08:05

## 2022-05-19 RX ADMIN — MORPHINE SULFATE 4 MG: 8 INJECTION INTRAVENOUS at 08:05

## 2022-05-19 RX ADMIN — TRANEXAMIC ACID 1000 MG: 100 INJECTION, SOLUTION INTRAVENOUS at 10:05

## 2022-05-19 RX ADMIN — CEFAZOLIN SODIUM 2 G: 10 INJECTION, POWDER, FOR SOLUTION INTRAVENOUS at 08:05

## 2022-05-19 NOTE — ANESTHESIA PREPROCEDURE EVALUATION
05/19/2022  Marian Foster is a 51 y.o., female.      Pre-op Assessment    I have reviewed the Patient Summary Reports.    I have reviewed the NPO Status.   I have reviewed the Medications.     Review of Systems         Anesthesia Plan  Type of Anesthesia, risks & benefits discussed:    Anesthesia Type: Gen Supraglottic Airway, Regional  Intra-op Monitoring Plan: Standard ASA Monitors  Post Op Pain Control Plan: multimodal analgesia  Induction:  IV  Informed Consent: Informed consent signed with the Patient and all parties understand the risks and agree with anesthesia plan.  All questions answered.   ASA Score: 3    Ready For Surgery From Anesthesia Perspective.     .  NPO greater than 8 hours  NAC  Allergies   Adhesive Tape-silicones  Medium Allergy 4/13/2021    Demerol [Meperidine]  Medium Allergy 4/13/2021    Erythromycin  Medium Allergy 4/13/2021    Prozac [Fluoxetine]  Medium Allergy 4/13/2021    Tetracyclines  Medium Allergy 4/13/2021      Hct 36  Ca 8.1  12/7/21 EKG: Normal sinus rhythm 78 bpm;  Nonspecific ST abnormality    Arthritis Hypertension   Disorder of kidney and ureter Liver disease   Osteoarthritis Seizures   COPD (chronic obstructive pulmonary disease) Sleep apnea   Home oxygen (3 L)  Super morbid obesity (BMI 51)  GERD  Hypocalcemia    Airway exam deferred (COVID precautions); adequate ROM at neck.    Plan is general plus adductor canal block

## 2022-05-19 NOTE — NURSING
1445 Pt up from surgery. No distress noted. States she is in pain and needs to use the restroom. Repositioned in bed. Hemovac on. Cool jet on. TEDs on and compressions on feet. No other complaints or requests at this time. Call bell in reach. Side rails up x2.     1530 Placed pt on bedpan. Gave morphine. Repositioned in bed. No distress noted. No complaints or requests at this time. Call bell in reach. Side rails up x2.     1840 Pt resting in bed. Placed on bedpan. No distress noted. No complaints or requests at this time. Call bell in reach. Side rails up x2.

## 2022-05-19 NOTE — ANESTHESIA PROCEDURE NOTES
Peripheral Block    Patient location during procedure: OR   Block not for primary anesthetic.  Reason for block: at surgeon's request and post-op pain management   Post-op Pain Location: Right   Start time: 5/19/2022 8:33 AM  Timeout: 5/19/2022 8:31 AM   End time: 5/19/2022 8:38 AM    Staffing  Authorizing Provider: Marlon Carrizales MD  Performing Provider: Marlon Carrizales MD    Preanesthetic Checklist  Completed: patient identified, risks and benefits discussed, pre-op evaluation and timeout performed  Peripheral Block  Patient position: supine  Prep: ChloraPrep  Block type: adductor canal  Laterality: right  Injection technique: single shot  Needle  Needle localization: ultrasound guidance     Assessment  Injection assessment: negative aspiration    Medications:    Medications: ROPIvacaine (NAROPIN) injection 0.75% - Perineural   40 mL - 5/19/2022 8:39:00 AM  dexamethasone sodium phos (PF) injection 10 mg/mL - Other   4 mg - 5/19/2022 8:39:00 AM    Additional Notes  No complications.

## 2022-05-19 NOTE — ANESTHESIA PROCEDURE NOTES
Intubation    Date/Time: 5/19/2022 8:43 AM  Performed by: Michael Sims II, CRNA  Authorized by: Marlon Carrizales MD     Intubation:     Induction:  Intravenous    Intubated:  Postinduction    Mask Ventilation:  Moderately difficult with oral airway    Attempts:  2    Attempted By:  CRNA    Method of Intubation:  Direct    Blade:  Rosales 2    Laryngeal View Grade: Grade III - only epiglottis visible      Attempted By (2nd Attempt):  CRNA    Method of Intubation (2nd Attempt):  Video laryngoscopy    Blade (2nd Attempt):  Glidescope 3    Laryngeal View Grade (2nd Attempt): Grade I - full view of cords      Difficult Airway Encountered?: Yes      Future Airway Recommendations:  Glidescope    Complications:  Esophageal intubation - immediately recognized and removed and desaturation (clinically insignificant)    Airway Device:  Oral endotracheal tube    Airway Device Size:  7.5    Style/Cuff Inflation:  Cuffed    Inflation Amount (mL):  8    Tube secured:  23    Secured at:  The lips    Placement Verified By:  Capnometry    Complicating Factors:  Anterior larynx, small mouth, obesity, short neck and oropharyngeal edema or fat    Findings Post-Intubation:  BS equal bilateral and atraumatic/condition of teeth unchanged

## 2022-05-19 NOTE — H&P
[facility] - Orthopedic Periop Services  Orthopedics  H&P    Patient Name: Marian Foster  MRN: [id number]  Admission Date: (Not on file)  Primary Care Provider: Primary Doctor No    Patient information was obtained from patient and ER records.     Subjective:     Principal Problem:Primary osteoarthritis of right knee    Chief Complaint: No chief complaint on file.       HPI:   Chief complaint:  Right knee pain  History:Marian Foster is a 51 y.o. female seen for recheck of right knee pain.  She underwent left total knee replacement arthroplasty 12/07/2021.  She has done well aspect her left knee since that time.  She had progressive right knee pain refractory to conservative management.  X-rays of the right knee 09/24/2021 demonstrate evidence of tricompartmental degenerative changes.  For  Impression:  Severe DJD-right knee  Plan:  Patient offered right total knee replacement arthroplasty.  Procedure was discussed in detail using models and actual components.  The potential benefits and risks of surgery outlined to include but not limited to bleeding, infection, damage to blood vessels and nerves, need for further surgery, other risks and complications including even death and she wished to proceed.  We discussed overnight stay in the hospital.              Past Medical History:   Diagnosis Date    Arthritis     COPD (chronic obstructive pulmonary disease)     Disorder of kidney and ureter     Hypertension     Liver disease     Osteoarthritis     Seizures     Sleep apnea        Past Surgical History:   Procedure Laterality Date    ABDOMINAL SURGERY      APPENDECTOMY      BREAST SURGERY      CHOLECYSTECTOMY      GANGLION CYST EXCISION Left     HYSTERECTOMY      KNEE ARTHROSCOPY      LAPAROSCOPY      lithrotripsy      MYRINGOTOMY W/ TUBES Left     TONSILLECTOMY      TRIGGER FINGER RELEASE Right        Review of patient's allergies indicates:   Allergen Reactions    Adhesive tape-silicones     Demerol  "[meperidine]     Erythromycin     Prozac [fluoxetine]     Tetracyclines        No current facility-administered medications for this encounter.     Current Outpatient Medications   Medication Sig    carBAMazepine (TEGRETOL XR) 100 MG 12 hr tablet Take 200 mg by mouth once daily. Every am    carBAMazepine (TEGRETOL XR) 100 MG 12 hr tablet Take 100 mg by mouth once daily. At lunch time    carBAMazepine (TEGRETOL XR) 100 MG 12 hr tablet Take 200 mg by mouth nightly.    clotrimazole-betamethasone 1-0.05% (LOTRISONE) cream     ergocalciferol (ERGOCALCIFEROL) 50,000 unit Cap Take 50,000 Units by mouth every 7 days.    EScitalopram oxalate (LEXAPRO) 20 MG tablet Take 20 mg by mouth once daily.    estradiol valerate (DELESTROGEN) 20 mg/mL injection     febuxostat (ULORIC) 40 mg Tab Take 40 mg by mouth once daily.    HYDROcodone-acetaminophen (NORCO)  mg per tablet One p.o. Q 4 2 p.o. Q 6 p.r.n. pain left knee    magnesium oxide (MAG-OX) 400 mg (241.3 mg magnesium) tablet Take 400 mg by mouth 2 (two) times daily.    ORENCIA CLICKJECT 125 mg/mL AtIn once a week.    oxyCODONE-acetaminophen (PERCOCET)  mg per tablet     phentermine (ADIPEX-P) 37.5 mg tablet     potassium chloride SA (K-DUR,KLOR-CON) 20 MEQ tablet     tiZANidine (ZANAFLEX) 4 MG tablet Take 4 mg by mouth once daily.    zafirlukast (ACCOLATE) 20 MG tablet Take 20 mg by mouth 2 (two) times daily.     Family History    None       Tobacco Use    Smoking status: Former Smoker    Smokeless tobacco: Never Used   Substance and Sexual Activity    Alcohol use: Not Currently    Drug use: Not Currently    Sexual activity: Not on file     Review of Systems   Constitutional: Negative.   Objective:     Vital Signs (Most Recent):    Vital Signs (24h Range):        Weight: (!) 144.2 kg (317 lb 14.5 oz)  Height: 5' 6.5" (168.9 cm)  Body mass index is 50.54 kg/m².    No intake or output data in the 24 hours ending 05/18/22 " 2250    General    Constitutional: She is oriented to person, place, and time. She appears well-developed and well-nourished.   HENT:   Head: Normocephalic and atraumatic.   Eyes: EOM are normal. Pupils are equal, round, and reactive to light.   Neck: Neck supple.   Cardiovascular:  Normal rate, regular rhythm and normal heart sounds.            Pulmonary/Chest: Effort normal and breath sounds normal.   Abdominal: Soft. Bowel sounds are normal.   Neurological: She is alert and oriented to person, place, and time.   Psychiatric: She has a normal mood and affect. Her behavior is normal.             Significant Labs: All pertinent labs within the past 24 hours have been reviewed.    Significant Imaging: I have reviewed all pertinent imaging results/findings.    Assessment/Plan:     No notes have been filed under this hospital service.  Service: Orthopedic Surgery      Gaurang Toussaint MD  Orthopedics  [facility] - Orthopedic Periop Services

## 2022-05-19 NOTE — PLAN OF CARE
Problem: Occupational Therapy  Goal: Occupational Therapy Goal  Description: ST.Pt will perform bathing with Ceci with setup at EOB  2.Pt will perform UE dressing with Mary  3.Pt will perform LE dressing with Ceci  4.Pt will transfer bed/chair/bsc with SBA with crutches  5.Pt will perform standing task x 3 min with SBA with crutches  6.Tolerate 15 min of tx without fatigue.      LTG:   Restore to max I with selfcare and mobility.      Outcome: Ongoing, Progressing

## 2022-05-19 NOTE — PLAN OF CARE
[facility] - Orthopedic  Initial Discharge Assessment       Primary Care Provider: Primary Doctor No    Admission Diagnosis: Primary osteoarthritis of right knee [M17.11]    Admission Date: 5/19/2022  Expected Discharge Date:     Discharge Barriers Identified: None    Payor: MEDICARE / Plan: MEDICARE PART A & B / Product Type: Government /     Extended Emergency Contact Information  Primary Emergency Contact: ANTHONY VAUGHAN  Mobile Phone: [phone]  Relation: Daughter  Preferred language: English   needed? No  Secondary Emergency Contact: elsi parekh  Mobile Phone: [phone]  Relation: Sister    Discharge Plan A: Home with family, Home Health  Discharge Plan B: Home with family, Home Health      [facility] [address]  [address] [facility] [address]  Phone: [phone] Fax: [phone]    [facility] [place] - [address]  [address]  [facility] [address]  Phone: [phone] Fax: [phone]      Initial Assessment (most recent)     Adult Discharge Assessment - 05/19/22 1548        Discharge Assessment    Assessment Type Discharge Planning Assessment     Source of Information patient     Lives With sibling(s)     Do you expect to return to your current living situation? Yes     Do you have help at home or someone to help you manage your care at home? Yes     Who are your caregiver(s) and their phone number(s)? elsi parekh sister [phone]     Current cognitive status: Alert/Oriented     Equipment Currently Used at Home crutches;walker, rolling;bedside commode     Do you currently have service(s) that help you manage your care at home? No     Discharge Plan A Home with family;Home Health     Discharge Plan B Home with family;Home Health     DME Needed Upon Discharge  none     Discharge Plan discussed with: Patient     Discharge Barriers Identified None               Consult for swb/hh/bsc/rw, spoke with pt and pt  lives at home with family, choice for [facility] , pt has rw, bsc, and crutches for home use, confirmed with [facility] PT coordinator that they can see pt 5 days in a row starting 5/21/22 and then 3x per week thereafter, following

## 2022-05-19 NOTE — BRIEF OP NOTE
[facility] - Orthopedic Periop Services  Brief Operative Note    SUMMARY     Surgery Date: 5/19/2022     Surgeon(s) and Role:     * Gaurang Toussaint MD - Primary    Assisting Surgeon: None    Pre-op Diagnosis:  Primary osteoarthritis of right knee [M17.11]    Post-op Diagnosis:  Post-Op Diagnosis Codes:     * Primary osteoarthritis of right knee [M17.11]    Procedure(s) (LRB):  ARTHROPLASTY, KNEE, TOTAL, USING COMPUTER-ASSISTED NAVIGATION (Right)    Anesthesia: general    Operative Findings:  Severe DJD right knee    Estimated Blood Loss: * No values recorded between 5/19/2022  9:28 AM and 5/19/2022 11:02 AM *50cc    Estimated Blood Loss has been documented.         Specimens:   Specimen (24h ago, onward)            None          [id number]

## 2022-05-19 NOTE — OP NOTE
[facility] - Orthopedic Periop Services  Surgery Department  Operative Note    SUMMARY     Date of Procedure: 5/19/2022     Procedure: Procedure(s) (LRB):  ARTHROPLASTY, KNEE, TOTAL, USING COMPUTER-ASSISTED NAVIGATION (Right)     Surgeon(s) and Role:     * Gaurang Toussaint MD - Primary    Assisting Surgeon: None    Pre-Operative Diagnosis: Primary osteoarthritis of right knee [M17.11]    Post-Operative Diagnosis: Post-Op Diagnosis Codes:     * Primary osteoarthritis of right knee [M17.11]    Anesthesia:  General    Technical Procedures Used:  Computer navigated right TKR         DEPARTMENT OF ORTHOPEDIC SURGERY                OPERATIVE REPORT       NAME:  Marian Foster  MRN: [id number]   DATE OF SURGERY:  5/19/2022      PREOPERATIVE DIAGNOSIS:  Primary osteoarthritis of right knee [M17.11]       POSTOPERATIVE DIAGNOSIS: Primary osteoarthritis of right knee [M17.11]       PROCEDURE:   Computer navigated right total knee replacement arthroplasty      ANESTHESIA:  General      PROCEDURE IN DETAIL:  The patient was taken to the operating room and placed in supine position.  After an adequate level of general/regional block anesthesia been achieved (see Anesthesia note) patient's right lower extremity is covered Betadine and draped in sterile fashion.  The right lower extremity was elevated exsanguinated with elastic bandage.  At taken place but the right upper thighs inflated to pressure of 300 mmHg.  The operation was begun by making a skin incision over the anterior midline region of the right knee.  The incision was carried carefully through the subcutaneous layers.  Skin flaps were developed.  The joint was opened through a medial parapatellar incision.  The patella was distracted laterally and the knee was flexed.  a thorough debridement of the joint was performed including removal of the remnants of the menisci.  The ACL was resected.  The PCL was retained.  The computer tracker was attached to the distal femur.   The hip was brought through a circumduction maneuver registering the center of hip rotation.  A distal femoral cutting guide was attached to the arch bar.  The varus/valgus and flexion/extension parameters were adjusted and the resection guide was pinned in place.  The distal femoral resection was performed with the oscillating saw.  The guides were removed.  With the knee flexed the tibia was brought forward using the PCL guide.  The tibial spines were resected with an oscillating saw.  The tibial arch bar was attached to the tibial plateau cut surface of femoral, tibial and ankle data points were obtained.  Now the proximal tibial cutting guide was attached to the arch bar and adjusted for varus/valgus and depth of her sec.  The proximal tibial resection guide was then pinned in place and the arch bar was removed.  The proximal tibial resection cut was made with an oscillating saw.  Adequate resection was performed with a spacer.  The cutting guide was removed.  the gap space was tensioned using the Lopez guide and varus/valgus alignment was confirmed.  the measured flexion and extension gaps were set on the resection guide and drill holes made for the 4 in 1 cutting block.  The resection guide was attached and pinned in place.  The anterior, posterior, anterior chamfer and posterior chamfer cuts were made with an oscillating saw.  The guide was removed.  The femoral and tibial trials were put in place.  a tibial spacer was engaged with the base plate.  The knee was brought through good stable range of motion.  Rotation of the tibial tray was marked.  The distal femoral peg holes were made with the drill.  Attention was turned to the patella.  The patella was everted and measured with a caliper.  The articular surface of the patella was resected with a patellar mill leaving 14 mm of bone.  peg holes were drilled for the polyethylene patellar button.  Trial component was inserted and he was again brought to a  good stable range of motion.  The trial components were removed.  The knee joint was thoroughly irrigated with antibiotic solution.  The cut tibial surface was again brought anteriorly and a porous coated triathlon [place] size 5 tibia porous-coated tibial component was inserted with a size 11 mm thick polyethylene tibial insert.  Now the size 5 porous-coated femoral component was implanted.  Knee was brought to good stable range of motion.  Attention was turned back to the patella.  The cut surface was irrigated with antibiotic solution.  A 32 mm diameter by 10  mm thick patellar polyethylene button was cemented in place with methylmethacrylate.  The cement was allowed to harden and the excess was removed.  The tourniquet was then let down and hemostasis was achieved with Bovie electrocautery.  The knee was again irrigated with antibiotic solution.  The quadriceps and medial retinacular tissues were reapproximated with interrupted 1. Vicryl suture.  Subcutaneous tissue was approximated with 2 O Vicryl suture.  Skin margin approximated with stainless steel staples.  Wounds were dressed sterilely and a knee immobilizer was applied.  Patient taken to recovery room in satisfactory condition.  Estimated blood loss 50 cc        Gaurang Toussaint MD                  Complications: No    Estimated Blood Loss (EBL): * No values recorded between 5/19/2022  9:28 AM and 5/19/2022 11:04 AM *           Implants:   Implant Name Type Inv. Item Serial No.  Lot No. LRB No. Used Action   GUIDEPIN 3.20MM 4 GALILEA SQUARE - [id number]  GUIDEPIN 3.20MM 4 GALILEA SQUARE  [facility]  Right 1 Implanted and Explanted   GUIDESCREW 6 GALILEA HEXAGONAL 3.20MM - [id number]  GUIDESCREW 6 GALILEA HEXAGONAL 3.20MM  [facility] ([facility])  Right 1 Implanted and Explanted   CEMENT BONE SMARTSET HV 40G - [id number] Cement CEMENT BONE SMARTSET HV 40G  [facility] ([facility]) [id number] Right 1 Implanted   IMP COMP TIB TRIATHLON SZ5 -  [id number] Implant IMP COMP TIB TRIATHLON SZ5  [employer] [id number] Right 1 Implanted   IMP INSERT TIBIAL BEARING 5X11MM - [id number] Prosthesis IMP INSERT TIBIAL BEARING 5X11MM  [employer] [id number] Right 1 Implanted   IMP PATELLA ASYMMETRIC A32 10MM - [id number] Prosthesis IMP PATELLA ASYMMETRIC A32 10MM  [employer] [id number] Right 1 Implanted   IMP COMPONENT FEM 5 RT CR - [id number] Prosthesis IMP COMPONENT FEM 5 RT CR  [employer] [id number] Right 1 Implanted       Specimens:   Specimen (24h ago, onward)            None                  Condition: Good    Disposition: PACU - hemodynamically stable.    Attestation: I was present and scrubbed for the entire procedure.

## 2022-05-19 NOTE — PT/OT/SLP EVAL
Physical Therapy Evaluation    Patient Name:  Marian Foster   MRN:  [id number]    Recommendations:     Discharge Recommendations:  home health PT, home health OT   Discharge Equipment Recommendations: none   Barriers to discharge: None    Assessment:     Marian Foster is a 51 y.o. female admitted with a medical diagnosis of Primary osteoarthritis of right knee.  She presents with the following impairments/functional limitations:  decreased ROM, gait instability, pain, impaired functional mobilty Patient with limited mobility due to pain. Patient feels like the blocks havent been effective in pain relief. Needs a lot of encouragement for oob mobility. Does okay with crutches. Plan is to go home in am.    Rehab Prognosis: Good; patient would benefit from acute skilled PT services to address these deficits and reach maximum level of function.    Recent Surgery: Procedure(s) (LRB):  ARTHROPLASTY, KNEE, TOTAL, USING COMPUTER-ASSISTED NAVIGATION (Right) Day of Surgery    Plan:     During this hospitalization, patient to be seen BID to address the identified rehab impairments via gait training, therapeutic activities and progress toward the following goals:    · Plan of Care Expires:       Subjective     Chief Complaint: post op apin  Patient/Family Comments/goals: Patient complains   Pain/Comfort:  Pain Rating 1: 6/10  Location - Side 1: Right  Location 1: knee    Patients cultural, spiritual, Pentecostalism conflicts given the current situation: no    Living Environment:  Lives with siters  Prior to admission, patients level of function was independetn.  Equipment used at home: 3-in-1 commode, crutches, axillary.  DME owned (not currently used): crutches.  Upon discharge, patient will have assistance from family.    Objective:     Communicated with nurse prior to session.  Patient found up in chair with peripheral IV, pulse ox (continuous), blood pressure cuff  upon PT entry to room.    General Precautions: Standard, fall    Orthopedic Precautions:RLE weight bearing as tolerated   Braces:    Respiratory Status: Room air    Exams:  · Cognitive Exam:  Patient is oriented to Person, Place, Time and Situation  · RLE ROM: knee immobilized  · RLE Strength: 4/5  · LLE ROM: WNL  · LLE Strength: WNL    Functional Mobility:  · Bed Mobility:     · Supine to Sit: contact guard assistance  · Sit to Supine: contact guard assistance  · Transfers:     · Sit to Stand:  contact guard assistance with axillary crutches  · Gait: ambulated 15 feet with crutches wbat, 75% decreased elizabeth    Therapeutic Activities and Exercises:   reviewed tx plan    AM-PAC 6 CLICK MOBILITY  Total Score:18     Patient left supine with call button in reach.    GOALS:   Multidisciplinary Problems     Physical Therapy Goals        Problem: Physical Therapy    Goal Priority Disciplines Outcome Goal Variances Interventions   Physical Therapy Goal     PT, PT/OT Ongoing, Progressing     Description: Short Term Goals  Independent with HEP  Independent with crutchesx 100 feet FWB/WBAT: right lower extremity  0-100 knee flexion    Long term goals  Needed equipment for home.                        History:     Past Medical History:   Diagnosis Date    Arthritis     COPD (chronic obstructive pulmonary disease)     Disorder of kidney and ureter     Hypertension     Liver disease     Osteoarthritis     Seizures     Sleep apnea        Past Surgical History:   Procedure Laterality Date    ABDOMINAL SURGERY      x3    APPENDECTOMY      BREAST SURGERY      CHOLECYSTECTOMY      GANGLION CYST EXCISION Left     HYSTERECTOMY      KNEE ARTHROSCOPY      LAPAROSCOPY      lithrotripsy      MYRINGOTOMY W/ TUBES Left     TONSILLECTOMY      TRIGGER FINGER RELEASE Right        Time Tracking:     PT Received On: 05/19/22  PT Start Time: 1600     PT Stop Time: 1630  PT Total Time (min): 30 min     Billable Minutes: Evaluation 20 05/19/2022

## 2022-05-19 NOTE — INTERVAL H&P NOTE
The patient has been examined and the H&P has been reviewed:    I concur with the findings and no changes have occurred since H&P was written.    Surgery risks, benefits and alternative options discussed and understood by patient/family.          Active Hospital Problems    Diagnosis  POA    *Primary osteoarthritis of right knee [M17.11]  Yes      Resolved Hospital Problems   No resolved problems to display.

## 2022-05-19 NOTE — TRANSFER OF CARE
"Anesthesia Transfer of Care Note    Patient: Marian Foster    Procedure(s) Performed: Procedure(s) (LRB):  ARTHROPLASTY, KNEE, TOTAL, USING COMPUTER-ASSISTED NAVIGATION (Right)    Patient location: PACU    Anesthesia Type: general and regional    Transport from OR: Transported from OR on 6-10 L/min O2 by face mask with adequate spontaneous ventilation    Post pain: adequate analgesia    Post assessment: no apparent anesthetic complications    Post vital signs: stable    Level of consciousness: responds to stimulation    Nausea/Vomiting: no nausea/vomiting    Complications: none    Transfer of care protocol was followed      Last vitals:   Visit Vitals  /63 (BP Location: Left arm, Patient Position: Lying)   Pulse 96   Temp 37 °C (98.6 °F) (Oral)   Resp 16   Ht 5' 6" (1.676 m)   Wt (!) 142 kg (313 lb)   SpO2 (!) 94%   Breastfeeding No   BMI 50.52 kg/m²     "

## 2022-05-19 NOTE — PLAN OF CARE
Problem: Physical Therapy  Goal: Physical Therapy Goal  Description: Short Term Goals  Independent with HEP  Independent with crutchesx 100 feet FWB/WBAT: right lower extremity  0-100 knee flexion    Long term goals  Needed equipment for home.       Outcome: Ongoing, Progressing

## 2022-05-19 NOTE — DISCHARGE INSTRUCTIONS
*Keep dressing dry and intact, do not remove dressing, if dressing becomes wet or bloody notify home health staff.  Home Health will remove your drain (if you have one) on post op day #2 and change your dressing on post op day #3, give them that special dressing we sent home with you.  *Continue incentive spirometry at least every 2 hours while awake.  *Continue white stockings remove 2 times a day for 1 hour and replace. Once dressing is changed, apply other stocking to surgery leg.   *Continue cool-jet to knee. Do not apply directly to skin.   *Take laxative of choice to have a bowel movement at least by tomorrow and then every other day.  *Increase fluids by mouth.  *Staples will be removed by home health/swingbed staff 2 weeks from surgery prior to follow up appoinment.  *Elevate surgery leg on pillow at ankle. No pillow under knees.  *Notify home health staff of any concerns.

## 2022-05-19 NOTE — SUBJECTIVE & OBJECTIVE
Past Medical History:   Diagnosis Date    Arthritis     COPD (chronic obstructive pulmonary disease)     Disorder of kidney and ureter     Hypertension     Liver disease     Osteoarthritis     Seizures     Sleep apnea        Past Surgical History:   Procedure Laterality Date    ABDOMINAL SURGERY      APPENDECTOMY      BREAST SURGERY      CHOLECYSTECTOMY      GANGLION CYST EXCISION Left     HYSTERECTOMY      KNEE ARTHROSCOPY      LAPAROSCOPY      lithrotripsy      MYRINGOTOMY W/ TUBES Left     TONSILLECTOMY      TRIGGER FINGER RELEASE Right        Review of patient's allergies indicates:   Allergen Reactions    Adhesive tape-silicones     Demerol [meperidine]     Erythromycin     Prozac [fluoxetine]     Tetracyclines        No current facility-administered medications for this encounter.     Current Outpatient Medications   Medication Sig    carBAMazepine (TEGRETOL XR) 100 MG 12 hr tablet Take 200 mg by mouth once daily. Every am    carBAMazepine (TEGRETOL XR) 100 MG 12 hr tablet Take 100 mg by mouth once daily. At lunch time    carBAMazepine (TEGRETOL XR) 100 MG 12 hr tablet Take 200 mg by mouth nightly.    clotrimazole-betamethasone 1-0.05% (LOTRISONE) cream     ergocalciferol (ERGOCALCIFEROL) 50,000 unit Cap Take 50,000 Units by mouth every 7 days.    EScitalopram oxalate (LEXAPRO) 20 MG tablet Take 20 mg by mouth once daily.    estradiol valerate (DELESTROGEN) 20 mg/mL injection     febuxostat (ULORIC) 40 mg Tab Take 40 mg by mouth once daily.    HYDROcodone-acetaminophen (NORCO)  mg per tablet One p.o. Q 4 2 p.o. Q 6 p.r.n. pain left knee    magnesium oxide (MAG-OX) 400 mg (241.3 mg magnesium) tablet Take 400 mg by mouth 2 (two) times daily.    ORENCIA CLICKJECT 125 mg/mL AtIn once a week.    oxyCODONE-acetaminophen (PERCOCET)  mg per tablet     phentermine (ADIPEX-P) 37.5 mg tablet     potassium chloride SA (K-DUR,KLOR-CON) 20 MEQ tablet     tiZANidine (ZANAFLEX) 4 MG tablet Take 4 mg by mouth once  "daily.    zafirlukast (ACCOLATE) 20 MG tablet Take 20 mg by mouth 2 (two) times daily.     Family History    None       Tobacco Use    Smoking status: Former Smoker    Smokeless tobacco: Never Used   Substance and Sexual Activity    Alcohol use: Not Currently    Drug use: Not Currently    Sexual activity: Not on file     Review of Systems   Constitutional: Negative.   Objective:     Vital Signs (Most Recent):    Vital Signs (24h Range):        Weight: (!) 144.2 kg (317 lb 14.5 oz)  Height: 5' 6.5" (168.9 cm)  Body mass index is 50.54 kg/m².    No intake or output data in the 24 hours ending 05/18/22 2250    General    Constitutional: She is oriented to person, place, and time. She appears well-developed and well-nourished.   HENT:   Head: Normocephalic and atraumatic.   Eyes: EOM are normal. Pupils are equal, round, and reactive to light.   Neck: Neck supple.   Cardiovascular:  Normal rate, regular rhythm and normal heart sounds.            Pulmonary/Chest: Effort normal and breath sounds normal.   Abdominal: Soft. Bowel sounds are normal.   Neurological: She is alert and oriented to person, place, and time.   Psychiatric: She has a normal mood and affect. Her behavior is normal.             Significant Labs: All pertinent labs within the past 24 hours have been reviewed.    Significant Imaging: I have reviewed all pertinent imaging results/findings.  "

## 2022-05-19 NOTE — HPI
Chief complaint:  Right knee pain  History:Marian Foster is a 51 y.o. female seen for recheck of right knee pain.  She underwent left total knee replacement arthroplasty 12/07/2021.  She has done well aspect her left knee since that time.  She had progressive right knee pain refractory to conservative management.  X-rays of the right knee 09/24/2021 demonstrate evidence of tricompartmental degenerative changes.  For  Impression:  Severe DJD-right knee  Plan:  Patient offered right total knee replacement arthroplasty.  Procedure was discussed in detail using models and actual components.  The potential benefits and risks of surgery outlined to include but not limited to bleeding, infection, damage to blood vessels and nerves, need for further surgery, other risks and complications including even death and she wished to proceed.  We discussed overnight stay in the hospital.

## 2022-05-19 NOTE — PT/OT/SLP EVAL
Occupational Therapy   Evaluation    Name: Marian Foster  MRN: [id number]  Admitting Diagnosis:  Primary osteoarthritis of right knee  Recent Surgery: Procedure(s) (LRB):  ARTHROPLASTY, KNEE, TOTAL, USING COMPUTER-ASSISTED NAVIGATION (Right) Day of Surgery    Recommendations:     Discharge Recommendations: home with home health  Discharge Equipment Recommendations:  none  Barriers to discharge:  None    Assessment:     Marian Foster is a 51 y.o. female with a medical diagnosis of Primary osteoarthritis of right knee.  She presents with s/p right TKR on 5/19/22. Performance deficits affecting function: impaired self care skills, impaired functional mobilty, gait instability, impaired balance.      Rehab Prognosis: Good; patient would benefit from acute skilled OT services to address these deficits and reach maximum level of function.       Plan:     Patient to be seen 5 x/week to address the above listed problems via self-care/home management, therapeutic activities, therapeutic exercises  · Plan of Care Expires: 05/26/22  · Plan of Care Reviewed with: patient    Subjective     Chief Complaint: s/p TKR  Patient/Family Comments/goals: pt agreeable to participate in OT eval    Occupational Profile:  Living Environment: pt lives with sister in one story home with one step to enter  Previous level of function: independent with all ADL tasks and utilized cane for functional mobility   Roles and Routines: perform self care  Equipment Used at Home:  bedside commode, walker, standard, crutches, axillary, cane, straight  Assistance upon Discharge: home with home health    Pain/Comfort:  · Pain Rating 1: 10/10  · Location - Side 1: Right  · Location 1: knee  · Pain Addressed 1: Pre-medicate for activity    Patients cultural, spiritual, Caodaism conflicts given the current situation: no    Objective:     Communicated with: JASPAL Bazan prior to session.  Patient found supine with blood pressure cuff, hemovac, knee immobilizer,  oxygen, peripheral IV, pulse ox (continuous), SCD upon OT entry to room.    General Precautions: Standard, fall   Orthopedic Precautions:RLE weight bearing as tolerated   Braces:    Respiratory Status: Nasal cannula, flow 2 L/min    Occupational Performance:    Bed Mobility:    · Patient completed Supine to Sit with minimum assistance  · Patient completed Sit to Supine with stand by assistance    Functional Mobility/Transfers:  · Patient completed Sit <> Stand Transfer with contact guard assistance  with  axillary crutches and bed elevated   · Functional Mobility: pt performed functional mobility with crutches with CGA    Activities of Daily Living:  · Lower Body Dressing: maximal assistance to perla socks    Cognitive/Visual Perceptual:  Cognitive/Psychosocial Skills:     -       Oriented to: Person, Place, Time and Situation   -       Follows Commands/attention:Follows multistep  commands  -       Mood/Affect/Coping skills/emotional control: Cooperative    Physical Exam:  Balance:    -       WFL with crutches  Upper Extremity Range of Motion:     -       Right Upper Extremity: WFL  -       Left Upper Extremity: WFL  Upper Extremity Strength:    -       Right Upper Extremity: WFL  -       Left Upper Extremity: WFL  Gross motor coordination:   WFL    AMPAC 6 Click ADL:  AMPAC Total Score:      Treatment & Education:  · Pt educated on OT role/POC.   · Importance of OOB activity with staff assistance.  · Importance of sitting up in the chair throughout the day as tolerated, especially for meals   · Safety during functional t/f and mobility with use of AD  · Importance of assisting with self-care activities   · All questions/concerns answered within OT scope of practice    Education:    Patient left supine with all lines intact and call button in reach    GOALS:   Multidisciplinary Problems     Occupational Therapy Goals        Problem: Occupational Therapy    Goal Priority Disciplines Outcome Interventions    Occupational Therapy Goal     OT, PT/OT Ongoing, Progressing    Description: ST.Pt will perform bathing with Ceci with setup at EOB  2.Pt will perform UE dressing with Mary  3.Pt will perform LE dressing with Ceci  4.Pt will transfer bed/chair/bsc with SBA with crutches  5.Pt will perform standing task x 3 min with SBA with crutches  6.Tolerate 15 min of tx without fatigue.      LTG:   Restore to max I with selfcare and mobility.                       History:     Past Medical History:   Diagnosis Date    Arthritis     COPD (chronic obstructive pulmonary disease)     Disorder of kidney and ureter     Hypertension     Liver disease     Osteoarthritis     Seizures     Sleep apnea        Past Surgical History:   Procedure Laterality Date    ABDOMINAL SURGERY      x3    APPENDECTOMY      BREAST SURGERY      CHOLECYSTECTOMY      GANGLION CYST EXCISION Left     HYSTERECTOMY      KNEE ARTHROSCOPY      LAPAROSCOPY      lithrotripsy      MYRINGOTOMY W/ TUBES Left     TONSILLECTOMY      TRIGGER FINGER RELEASE Right        Time Tracking:     OT Date of Treatment: 22  OT Start Time: 1611  OT Stop Time: 1630  OT Total Time (min): 19 min    Billable Minutes:Evaluation OT min complexity eval    2022

## 2022-05-19 NOTE — PLAN OF CARE
1105  Pt to pacu pt resp reg and non labored pt dsg d/i to r knee imobilizer intact pt crio therapy started pt sao2 95% on 2l nbp will monitor     1120 pt c/o pain in r knee pt pain level 10 gave dilaudid 0.5mg ivp will titrate for pain control     1130  X rays done pt sao2 97% on 2l nbp pt cont to c/o pain titrating dilaudid for pain control  Unable to contact lab will cont to try    1145 pt awake pt sao2 95% on 2l nbp pt cont to c/o pain titrating dilaudid  For pain control     1210 pt released to jhonny meza rn b/p 136/83 pulse 104 resp 12 sao2 96% on 2l nbp

## 2022-05-20 VITALS
HEART RATE: 80 BPM | SYSTOLIC BLOOD PRESSURE: 139 MMHG | WEIGHT: 293 LBS | HEIGHT: 66 IN | BODY MASS INDEX: 47.09 KG/M2 | RESPIRATION RATE: 17 BRPM | DIASTOLIC BLOOD PRESSURE: 76 MMHG | OXYGEN SATURATION: 97 % | TEMPERATURE: 99 F

## 2022-05-20 LAB
ALBUMIN SERPL BCP-MCNC: 3.3 G/DL (ref 3.5–5)
ALBUMIN/GLOB SERPL: 1.1 {RATIO}
ALP SERPL-CCNC: 98 U/L (ref 41–108)
ALT SERPL W P-5'-P-CCNC: 42 U/L (ref 13–56)
ANION GAP SERPL CALCULATED.3IONS-SCNC: 17 MMOL/L (ref 7–16)
AST SERPL W P-5'-P-CCNC: 27 U/L (ref 15–37)
BASOPHILS # BLD AUTO: 0.04 K/UL (ref 0–0.2)
BASOPHILS NFR BLD AUTO: 0.3 % (ref 0–1)
BILIRUB SERPL-MCNC: 0.2 MG/DL (ref 0–1.2)
BUN SERPL-MCNC: 12 MG/DL (ref 7–18)
BUN/CREAT SERPL: 17 (ref 6–20)
CALCIUM SERPL-MCNC: 8.3 MG/DL (ref 8.5–10.1)
CHLORIDE SERPL-SCNC: 102 MMOL/L (ref 98–107)
CO2 SERPL-SCNC: 25 MMOL/L (ref 21–32)
CREAT SERPL-MCNC: 0.71 MG/DL (ref 0.55–1.02)
DIFFERENTIAL METHOD BLD: ABNORMAL
EOSINOPHIL # BLD AUTO: 0.06 K/UL (ref 0–0.5)
EOSINOPHIL NFR BLD AUTO: 0.4 % (ref 1–4)
ERYTHROCYTE [DISTWIDTH] IN BLOOD BY AUTOMATED COUNT: 13.3 % (ref 11.5–14.5)
GLOBULIN SER-MCNC: 3.1 G/DL (ref 2–4)
GLUCOSE SERPL-MCNC: 127 MG/DL (ref 74–106)
HCT VFR BLD AUTO: 34.2 % (ref 38–47)
HGB BLD-MCNC: 11.2 G/DL (ref 12–16)
IMM GRANULOCYTES # BLD AUTO: 0.07 K/UL (ref 0–0.04)
IMM GRANULOCYTES NFR BLD: 0.5 % (ref 0–0.4)
INDIRECT COOMBS: NORMAL
LYMPHOCYTES # BLD AUTO: 3.25 K/UL (ref 1–4.8)
LYMPHOCYTES NFR BLD AUTO: 23.9 % (ref 27–41)
MCH RBC QN AUTO: 29.1 PG (ref 27–31)
MCHC RBC AUTO-ENTMCNC: 32.7 G/DL (ref 32–36)
MCV RBC AUTO: 88.8 FL (ref 80–96)
MONOCYTES # BLD AUTO: 1.36 K/UL (ref 0–0.8)
MONOCYTES NFR BLD AUTO: 10 % (ref 2–6)
MPC BLD CALC-MCNC: 10.3 FL (ref 9.4–12.4)
NEUTROPHILS # BLD AUTO: 8.84 K/UL (ref 1.8–7.7)
NEUTROPHILS NFR BLD AUTO: 64.9 % (ref 53–65)
NRBC # BLD AUTO: 0 X10E3/UL
NRBC, AUTO (.00): 0 %
PLATELET # BLD AUTO: 315 K/UL (ref 150–400)
POTASSIUM SERPL-SCNC: 3.9 MMOL/L (ref 3.5–5.1)
PROT SERPL-MCNC: 6.4 G/DL (ref 6.4–8.2)
RBC # BLD AUTO: 3.85 M/UL (ref 4.2–5.4)
RH BLD: NORMAL
SODIUM SERPL-SCNC: 140 MMOL/L (ref 136–145)
WBC # BLD AUTO: 13.62 K/UL (ref 4.5–11)

## 2022-05-20 PROCEDURE — 63600175 PHARM REV CODE 636 W HCPCS: Performed by: HOSPITALIST

## 2022-05-20 PROCEDURE — 99499 UNLISTED E&M SERVICE: CPT | Mod: ,,, | Performed by: HOSPITALIST

## 2022-05-20 PROCEDURE — 25000003 PHARM REV CODE 250: Performed by: HOSPITALIST

## 2022-05-20 PROCEDURE — 94660 CPAP INITIATION&MGMT: CPT

## 2022-05-20 PROCEDURE — 99219 PR INITIAL OBSERVATION CARE,LEVL II: ICD-10-PCS | Mod: ,,, | Performed by: HOSPITALIST

## 2022-05-20 PROCEDURE — 36415 COLL VENOUS BLD VENIPUNCTURE: CPT | Performed by: ORTHOPAEDIC SURGERY

## 2022-05-20 PROCEDURE — 85025 COMPLETE CBC W/AUTO DIFF WBC: CPT | Performed by: ORTHOPAEDIC SURGERY

## 2022-05-20 PROCEDURE — 99900035 HC TECH TIME PER 15 MIN (STAT)

## 2022-05-20 PROCEDURE — 99499 NO LOS: ICD-10-PCS | Mod: ,,, | Performed by: HOSPITALIST

## 2022-05-20 PROCEDURE — 97110 THERAPEUTIC EXERCISES: CPT | Mod: CQ

## 2022-05-20 PROCEDURE — 27000190 HC CPAP FULL FACE MASK W/VALVE

## 2022-05-20 PROCEDURE — 63600175 PHARM REV CODE 636 W HCPCS: Performed by: ORTHOPAEDIC SURGERY

## 2022-05-20 PROCEDURE — 97116 GAIT TRAINING THERAPY: CPT | Mod: CQ

## 2022-05-20 PROCEDURE — 99219 PR INITIAL OBSERVATION CARE,LEVL II: CPT | Mod: ,,, | Performed by: HOSPITALIST

## 2022-05-20 PROCEDURE — 63600175 PHARM REV CODE 636 W HCPCS: Performed by: NURSE PRACTITIONER

## 2022-05-20 PROCEDURE — 94761 N-INVAS EAR/PLS OXIMETRY MLT: CPT

## 2022-05-20 PROCEDURE — 25000003 PHARM REV CODE 250: Performed by: ORTHOPAEDIC SURGERY

## 2022-05-20 PROCEDURE — 27000221 HC OXYGEN, UP TO 24 HOURS

## 2022-05-20 PROCEDURE — 80053 COMPREHEN METABOLIC PANEL: CPT | Performed by: ORTHOPAEDIC SURGERY

## 2022-05-20 RX ORDER — KETOROLAC TROMETHAMINE 30 MG/ML
30 INJECTION, SOLUTION INTRAMUSCULAR; INTRAVENOUS ONCE
Status: COMPLETED | OUTPATIENT
Start: 2022-05-20 | End: 2022-05-20

## 2022-05-20 RX ORDER — HYDROMORPHONE HYDROCHLORIDE 2 MG/ML
2 INJECTION, SOLUTION INTRAMUSCULAR; INTRAVENOUS; SUBCUTANEOUS ONCE
Status: COMPLETED | OUTPATIENT
Start: 2022-05-20 | End: 2022-05-20

## 2022-05-20 RX ORDER — CARBAMAZEPINE 100 MG/1
200 TABLET, EXTENDED RELEASE ORAL DAILY
Status: DISCONTINUED | OUTPATIENT
Start: 2022-05-20 | End: 2022-05-20

## 2022-05-20 RX ORDER — ESCITALOPRAM OXALATE 10 MG/1
20 TABLET ORAL DAILY
Status: DISCONTINUED | OUTPATIENT
Start: 2022-05-20 | End: 2022-05-20 | Stop reason: HOSPADM

## 2022-05-20 RX ORDER — HYDROCODONE BITARTRATE AND ACETAMINOPHEN 10; 325 MG/1; MG/1
TABLET ORAL
Qty: 30 TABLET | Refills: 0 | Status: SHIPPED | OUTPATIENT
Start: 2022-05-20 | End: 2022-08-30

## 2022-05-20 RX ORDER — UBIDECARENONE 75 MG
2000 CAPSULE ORAL DAILY
Status: DISCONTINUED | OUTPATIENT
Start: 2022-05-20 | End: 2022-05-20 | Stop reason: HOSPADM

## 2022-05-20 RX ORDER — MONTELUKAST SODIUM 10 MG/1
10 TABLET ORAL NIGHTLY
Status: DISCONTINUED | OUTPATIENT
Start: 2022-05-20 | End: 2022-05-20 | Stop reason: HOSPADM

## 2022-05-20 RX ORDER — CARBAMAZEPINE 100 MG/1
200 TABLET, EXTENDED RELEASE ORAL DAILY
Status: DISCONTINUED | OUTPATIENT
Start: 2022-05-20 | End: 2022-05-20 | Stop reason: HOSPADM

## 2022-05-20 RX ORDER — CARBAMAZEPINE 200 MG/1
200 TABLET ORAL ONCE
Status: COMPLETED | OUTPATIENT
Start: 2022-05-20 | End: 2022-05-20

## 2022-05-20 RX ORDER — CARBAMAZEPINE 100 MG/1
200 TABLET, EXTENDED RELEASE ORAL NIGHTLY
Status: DISCONTINUED | OUTPATIENT
Start: 2022-05-20 | End: 2022-05-20 | Stop reason: HOSPADM

## 2022-05-20 RX ORDER — ERGOCALCIFEROL 1.25 MG/1
50000 CAPSULE ORAL
Status: DISCONTINUED | OUTPATIENT
Start: 2022-05-25 | End: 2022-05-20 | Stop reason: HOSPADM

## 2022-05-20 RX ORDER — LANOLIN ALCOHOL/MO/W.PET/CERES
400 CREAM (GRAM) TOPICAL 2 TIMES DAILY
Status: DISCONTINUED | OUTPATIENT
Start: 2022-05-20 | End: 2022-05-20 | Stop reason: HOSPADM

## 2022-05-20 RX ORDER — CARBAMAZEPINE 100 MG/1
100 TABLET, EXTENDED RELEASE ORAL
Status: DISCONTINUED | OUTPATIENT
Start: 2022-05-20 | End: 2022-05-20 | Stop reason: HOSPADM

## 2022-05-20 RX ORDER — DIPHENHYDRAMINE HYDROCHLORIDE 50 MG/ML
50 INJECTION INTRAMUSCULAR; INTRAVENOUS ONCE
Status: COMPLETED | OUTPATIENT
Start: 2022-05-20 | End: 2022-05-20

## 2022-05-20 RX ORDER — MORPHINE SULFATE 4 MG/ML
4 INJECTION, SOLUTION INTRAMUSCULAR; INTRAVENOUS ONCE
Status: COMPLETED | OUTPATIENT
Start: 2022-05-20 | End: 2022-05-20

## 2022-05-20 RX ORDER — CARBAMAZEPINE 100 MG/1
100 TABLET, EXTENDED RELEASE ORAL DAILY
Status: DISCONTINUED | OUTPATIENT
Start: 2022-05-20 | End: 2022-05-20

## 2022-05-20 RX ADMIN — Medication 2000 MCG: at 08:05

## 2022-05-20 RX ADMIN — DIPHENHYDRAMINE HYDROCHLORIDE 50 MG: 50 INJECTION INTRAMUSCULAR; INTRAVENOUS at 04:05

## 2022-05-20 RX ADMIN — ESCITALOPRAM OXALATE 20 MG: 10 TABLET ORAL at 08:05

## 2022-05-20 RX ADMIN — Medication 400 MG: at 08:05

## 2022-05-20 RX ADMIN — MORPHINE SULFATE 4 MG: 8 INJECTION INTRAVENOUS at 07:05

## 2022-05-20 RX ADMIN — MORPHINE SULFATE 4 MG: 8 INJECTION INTRAVENOUS at 12:05

## 2022-05-20 RX ADMIN — HYDROMORPHONE HYDROCHLORIDE 2 MG: 2 INJECTION, SOLUTION INTRAMUSCULAR; INTRAVENOUS; SUBCUTANEOUS at 05:05

## 2022-05-20 RX ADMIN — MORPHINE SULFATE 4 MG: 4 INJECTION INTRAVENOUS at 04:05

## 2022-05-20 RX ADMIN — KETOROLAC TROMETHAMINE 30 MG: 30 INJECTION, SOLUTION INTRAMUSCULAR at 08:05

## 2022-05-20 RX ADMIN — CARBAMAZEPINE 200 MG: 200 TABLET ORAL at 05:05

## 2022-05-20 RX ADMIN — HYDROCODONE BITARTRATE AND ACETAMINOPHEN 1 TABLET: 10; 325 TABLET ORAL at 03:05

## 2022-05-20 RX ADMIN — CARBAMAZEPINE 200 MG: 100 TABLET, EXTENDED RELEASE ORAL at 08:05

## 2022-05-20 NOTE — HPI
51 you F underwent RTKR today.  She had LTKR last year and said she didn't not have as much problem with pain because the nerve block was effective.  She states this time it was not.  She had spoken with the anesthesiologist on the way back to her room around 1100 yesterday afternoon after surgery and he had stated sometimes they do not work as well.  She is having a great deal of pain at present and is shaking.  Her nurse became concerned and when she saw her shaking a checked her chart.  Apparently patient has a h/o seizure disorder after a SDH in 1997.  She had missed her nighttime antiepileptic.  Apparently a consult had been placed for hospitalist to evaluate and restart home meds.  No consult was called.      I came to see patient immediately at bedside.  She had no postictal state and said she did not have a seizure she was just shaking because of the pain.  She states she has had no sleep due the pain and that is excruciating.  I was concerned due to the lack of sleep lowering her seizure threshold and restarted meds and went ahead and gave her nighttime AED stat and will get her back on her regular routine today.      She is very concerned about having to leave today.  She is afraid she will not be able to control the pain.

## 2022-05-20 NOTE — ANESTHESIA POSTPROCEDURE EVALUATION
Anesthesia Post Evaluation    Patient: Marian Foster    Procedure(s) Performed: Procedure(s) (LRB):  ARTHROPLASTY, KNEE, TOTAL, USING COMPUTER-ASSISTED NAVIGATION (Right)    Final Anesthesia Type: general      Patient location during evaluation: PACU  Post-procedure vital signs: reviewed and stable  Pain management: adequate  Airway patency: patent  GAMAL mitigation strategies: Use of major conduction anesthesia (spinal/epidural) or peripheral nerve block  PONV status at discharge: No PONV  Anesthetic complications: no      Cardiovascular status: hemodynamically stable  Respiratory status: unassisted  Hydration status: euvolemic  Follow-up not needed.          Vitals Value Taken Time   /74 05/19/22 1416   Temp 37 °C (98.6 °F) 05/19/22 1110   Pulse 93 05/19/22 1422   Resp 16 05/19/22 1415   SpO2 98 % 05/19/22 1422   Vitals shown include unvalidated device data.      Event Time   Out of Recovery 12:05:00         Pain/Lisa Score: Pain Rating Prior to Med Admin: 7 (5/20/2022  8:16 AM)  Pain Rating Post Med Admin: 6 (5/20/2022  5:55 AM)  Lisa Score: 9 (5/19/2022 11:50 AM)

## 2022-05-20 NOTE — SUBJECTIVE & OBJECTIVE
Past Medical History:   Diagnosis Date    Arthritis     COPD (chronic obstructive pulmonary disease)     Disorder of kidney and ureter     Hypertension     Liver disease     Osteoarthritis     Seizures     Sleep apnea        Past Surgical History:   Procedure Laterality Date    ABDOMINAL SURGERY      x3    APPENDECTOMY      BREAST SURGERY      CHOLECYSTECTOMY      GANGLION CYST EXCISION Left     HYSTERECTOMY      KNEE ARTHROSCOPY      LAPAROSCOPY      lithrotripsy      MYRINGOTOMY W/ TUBES Left     TONSILLECTOMY      TRIGGER FINGER RELEASE Right        Review of patient's allergies indicates:   Allergen Reactions    Adhesive tape-silicones     Demerol [meperidine]     Erythromycin     Prozac [fluoxetine]     Tetracyclines        No current facility-administered medications on file prior to encounter.     Current Outpatient Medications on File Prior to Encounter   Medication Sig    carBAMazepine (TEGRETOL XR) 100 MG 12 hr tablet Take 200 mg by mouth once daily. Every am    carBAMazepine (TEGRETOL XR) 100 MG 12 hr tablet Take 100 mg by mouth once daily. At lunch time    carBAMazepine (TEGRETOL XR) 100 MG 12 hr tablet Take 200 mg by mouth nightly.    clotrimazole-betamethasone 1-0.05% (LOTRISONE) cream     cyanocobalamin (VITAMIN B-12) 1000 MCG tablet Take 2,000 mcg by mouth once daily.    ergocalciferol (ERGOCALCIFEROL) 50,000 unit Cap Take 50,000 Units by mouth every 7 days.    EScitalopram oxalate (LEXAPRO) 20 MG tablet Take 20 mg by mouth once daily.    estradiol valerate (DELESTROGEN) 20 mg/mL injection     febuxostat (ULORIC) 40 mg Tab Take 40 mg by mouth once daily.    ibuprofen (ADVIL,MOTRIN) 800 MG tablet Take 800 mg by mouth every 6 (six) hours as needed for Pain.    magnesium oxide (MAG-OX) 400 mg (241.3 mg magnesium) tablet Take 400 mg by mouth 2 (two) times daily.    ORENCIA CLICKJECT 125 mg/mL AtIn once a week.    oxyCODONE-acetaminophen (PERCOCET)  mg per tablet     phentermine (ADIPEX-P) 37.5 mg  tablet     potassium chloride SA (K-DUR,KLOR-CON) 20 MEQ tablet     tiZANidine (ZANAFLEX) 4 MG tablet Take 4 mg by mouth once daily.    zafirlukast (ACCOLATE) 20 MG tablet Take 20 mg by mouth 2 (two) times daily.    HYDROcodone-acetaminophen (NORCO)  mg per tablet One p.o. Q 4 2 p.o. Q 6 p.r.n. pain left knee     Family History    None       Tobacco Use    Smoking status: Former Smoker    Smokeless tobacco: Never Used   Substance and Sexual Activity    Alcohol use: Not Currently    Drug use: Not Currently    Sexual activity: Not on file     Review of Systems   Constitutional:  Negative for appetite change, chills, fatigue, fever and unexpected weight change.   HENT:  Negative for congestion, mouth sores, nosebleeds, sinus pain, sore throat and trouble swallowing.    Respiratory:  Negative for apnea, cough, chest tightness and shortness of breath.    Cardiovascular:  Negative for chest pain, palpitations and leg swelling.   Gastrointestinal:  Negative for abdominal pain, blood in stool, constipation, diarrhea, nausea and vomiting.   Genitourinary:  Negative for decreased urine volume, difficulty urinating, dysuria and frequency.   Musculoskeletal:  Positive for arthralgias and joint swelling. Negative for back pain and neck pain.   Skin:  Negative for rash.   Neurological:  Negative for syncope, light-headedness and headaches.   Hematological:  Does not bruise/bleed easily.   Psychiatric/Behavioral:  Negative for confusion and suicidal ideas.    Objective:     Vital Signs (Most Recent):  Temp: 99 °F (37.2 °C) (05/20/22 0323)  Pulse: 83 (05/20/22 0323)  Resp: 20 (05/20/22 0419)  BP: 126/68 (05/20/22 0323)  SpO2: 98 % (05/20/22 0323) Vital Signs (24h Range):  Temp:  [97.8 °F (36.6 °C)-99.9 °F (37.7 °C)] 99 °F (37.2 °C)  Pulse:  [] 83  Resp:  [10-20] 20  SpO2:  [91 %-98 %] 98 %  BP: (111-173)/(42-90) 126/68     Weight: (!) 142 kg (313 lb)  Body mass index is 50.52 kg/m².    Physical Exam  Constitutional:        General: She is in acute distress.      Appearance: She is obese.   HENT:      Head: Atraumatic.      Mouth/Throat:      Mouth: Mucous membranes are moist.      Pharynx: Oropharynx is clear.   Eyes:      Conjunctiva/sclera: Conjunctivae normal.      Pupils: Pupils are equal, round, and reactive to light.   Cardiovascular:      Rate and Rhythm: Normal rate and regular rhythm.      Pulses: Normal pulses.      Heart sounds: Normal heart sounds.   Pulmonary:      Effort: Pulmonary effort is normal.      Breath sounds: Normal breath sounds.   Abdominal:      General: Abdomen is flat. Bowel sounds are normal.      Palpations: Abdomen is soft.   Musculoskeletal:      Cervical back: Neck supple.      Right lower leg: No edema.      Left lower leg: No edema.   Skin:     General: Skin is warm and dry.      Capillary Refill: Capillary refill takes 2 to 3 seconds.      Coloration: Skin is not jaundiced or pale.      Findings: No bruising, lesion or rash.   Neurological:      General: No focal deficit present.      Mental Status: She is alert and oriented to person, place, and time.   Psychiatric:         Mood and Affect: Mood normal.       Significant Labs: All pertinent labs within the past 24 hours have been reviewed.    Significant Imaging: I have reviewed all pertinent imaging results/findings within the past 24 hours.

## 2022-05-20 NOTE — PT/OT/SLP PROGRESS
Physical Therapy Treatment    Patient Name:  Marian Foster   MRN:  [id number]    Recommendations:     Discharge Recommendations:  home health PT, home health OT   Discharge Equipment Recommendations: none   Barriers to discharge: None    Assessment:     Marian Foster is a 51 y.o. female admitted with a medical diagnosis of Primary osteoarthritis of both knees.  She presents with the following impairments/functional limitations:  decreased ROM, gait instability, pain, impaired functional mobilty.    Rehab Prognosis: Good; patient would benefit from acute skilled PT services to address these deficits and reach maximum level of function.    Recent Surgery: Procedure(s) (LRB):  ARTHROPLASTY, KNEE, TOTAL, USING COMPUTER-ASSISTED NAVIGATION (Right) 1 Day Post-Op    Plan:     During this hospitalization, patient to be seen BID to address the identified rehab impairments via gait training, therapeutic activities and progress toward the following goals:    · Plan of Care Expires:       Subjective     Chief Complaint: Pain  Patient/Family Comments/goals: Pt. States that she wants to get up to sitting without help, states she uses a strap to assist her leg and feels better when in control of it. Pt. States she has one very short step to get up at home and is accustomed to using crutches to navigate this step.   Pain/Comfort:  · Pain Rating 1: 8/10  · Pain Addressed 1: Pre-medicate for activity, Nurse notified  · Pain Rating Post-Intervention 1: 8/10      Objective:     Communicated with SN and Supervising PT prior to session.  Patient found HOB elevated with peripheral IV, SCD upon PT entry to room.     General Precautions: Standard, fall   Orthopedic Precautions:RLE weight bearing as tolerated   Braces: N/A  Respiratory Status: Room air     Functional Mobility:  · Bed Mobility:     · Scooting: stand by assistance  · Supine to Sit: stand by assistance  · Sit to Supine: contact guard assistance  · Transfers:     · Sit to Stand:   minimum assistance with axillary crutches  · Gait: Pt. amb. x 22 feet with axillary crutches with short standing rests required x2 due to pain level. VC for proper sequencing.   · Balance: Static standing with CGA and bilateral crutches4 x 30 seconds surrounding gait activity with no LOB.      AM-PAC 6 CLICK MOBILITY  Turning over in bed (including adjusting bedclothes, sheets and blankets)?: 4  Sitting down on and standing up from a chair with arms (e.g., wheelchair, bedside commode, etc.): 3  Moving from lying on back to sitting on the side of the bed?: 4  Moving to and from a bed to a chair (including a wheelchair)?: 3  Need to walk in hospital room?: 3 (CGA)  Climbing 3-5 steps with a railing?: 3  Basic Mobility Total Score: 20       Therapeutic Activities and Exercises:   Pt. Participated in mobility per above and Supine: APs, QS, Assisted Heel slides within 1-15 degrees of Flexion 2 x 10 reps each.     Patient left HOB elevated with all lines intact, call button in reach and family present..    GOALS:   Multidisciplinary Problems     Physical Therapy Goals        Problem: Physical Therapy    Goal Priority Disciplines Outcome Goal Variances Interventions   Physical Therapy Goal     PT, PT/OT Ongoing, Progressing     Description: Short Term Goals  Independent with HEP  Independent with crutchesx 100 feet FWB/WBAT: right lower extremity  0-100 knee flexion    Long term goals  Needed equipment for home.                        Time Tracking:     PT Received On: 05/20/22  PT Start Time: 0958     PT Stop Time: 1034  PT Total Time (min): 36 min     Billable Minutes: Gait Training 18 and Therapeutic Exercise 18    Treatment Type: Treatment  PT/PTA: PTA     PTA Visit Number: 1     05/20/2022

## 2022-05-20 NOTE — PLAN OF CARE
[facility] - Orthopedic  Discharge Final Note    Primary Care Provider: Diallo Sutton II, MD    Expected Discharge Date: 5/20/2022    Final Discharge Note (most recent)     Final Note - 05/20/22 1231        Final Note    Assessment Type Final Discharge Note     Anticipated Discharge Disposition Home-Health Care Svc        Post-Acute Status    Post-Acute Authorization Home Health     Home Health Status Set-up Complete/Auth obtained     Patient choice form signed by patient/caregiver List with quality metrics by geographic area provided;List from CMS Compare     Discharge Delays None known at this time                 Important Message from Medicare             Contact Info     SYLVIA Devine   Specialty: Emergency Medicine    [address]  [address]  [facility] [address]   Phone: [phone]       Next Steps: Follow up in 2 week(s)    Instructions: please follow up on June 7 at 9:40      pt for dc home today with [facility], clinicals faxed and notified [facility] of dc

## 2022-05-20 NOTE — HOSPITAL COURSE
Computer-aided right total knee replacement arthroplasty under general anesthesia regional block.  Hospital course was essentially uncomplicated.  T-max during hospital stay was 99.9° F. She is treated prophylactically with IV antibiotic therapy including vancomycin and Ancef.  She did not require blood transfusion.  Laboratory data at time of discharge showed hemoglobin 9.2 hematocrit 34.2.  She gradually improved was able to ambulate 15 ft with aid of a rolling walker.  [facility] was reviewed.  Patient was found.  She is noted to take Percocet 10 at home.  We will have her stop her Percocet 10 at this time.  We will begin Norco  p.o. q.4 hours or 2 p.o. 6 hours p.r.n. pain number 30 with no refills.  Any pain medication required after this she will obtain through her pain management provider.  She is noted to have a be on Tegretol.  There is a contraindication noted for Tegretol and Eliquis.  We will seek assistance with DVT prophylaxis with hospitalist.

## 2022-05-20 NOTE — CONSULTS
[facility] Orthopedic  [facility] Medicine  Consult Note    Patient Name: Marian Foster  MRN: [id number]  Admission Date: 5/19/2022  Hospital Length of Stay: 0 days  Attending Physician: Gaurang Toussaint MD   Primary Care Provider: Diallo Sutton II, MD           Patient information was obtained from patient, past medical records and ER records.     Consults  Subjective:     Principal Problem: Primary osteoarthritis of both knees    Chief Complaint: No chief complaint on file.       HPI: 51 you F underwent RTKR today.  She had LTKR last year and said she didn't not have as much problem with pain because the nerve block was effective.  She states this time it was not.  She had spoken with the anesthesiologist on the way back to her room around 1100 yesterday afternoon after surgery and he had stated sometimes they do not work as well.  She is having a great deal of pain at present and is shaking.  Her nurse became concerned and when she saw her shaking a checked her chart.  Apparently patient has a h/o seizure disorder after a SDH in 1997.  She had missed her nighttime antiepileptic.  Apparently a consult had been placed for hospitalist to evaluate and restart home meds.  No consult was called.      I came to see patient immediately at bedside.  She had no postictal state and said she did not have a seizure she was just shaking because of the pain.  She states she has had no sleep due the pain and that is excruciating.  I was concerned due to the lack of sleep lowering her seizure threshold and restarted meds and went ahead and gave her nighttime AED stat and will get her back on her regular routine today.      She is very concerned about having to leave today.  She is afraid she will not be able to control the pain.        Past Medical History:   Diagnosis Date    Arthritis     COPD (chronic obstructive pulmonary disease)     Disorder of kidney and ureter     Hypertension     Liver disease      Osteoarthritis     Seizures     Sleep apnea        Past Surgical History:   Procedure Laterality Date    ABDOMINAL SURGERY      x3    APPENDECTOMY      BREAST SURGERY      CHOLECYSTECTOMY      GANGLION CYST EXCISION Left     HYSTERECTOMY      KNEE ARTHROSCOPY      LAPAROSCOPY      lithrotripsy      MYRINGOTOMY W/ TUBES Left     TONSILLECTOMY      TRIGGER FINGER RELEASE Right        Review of patient's allergies indicates:   Allergen Reactions    Adhesive tape-silicones     Demerol [meperidine]     Erythromycin     Prozac [fluoxetine]     Tetracyclines        No current facility-administered medications on file prior to encounter.     Current Outpatient Medications on File Prior to Encounter   Medication Sig    carBAMazepine (TEGRETOL XR) 100 MG 12 hr tablet Take 200 mg by mouth once daily. Every am    carBAMazepine (TEGRETOL XR) 100 MG 12 hr tablet Take 100 mg by mouth once daily. At lunch time    carBAMazepine (TEGRETOL XR) 100 MG 12 hr tablet Take 200 mg by mouth nightly.    clotrimazole-betamethasone 1-0.05% (LOTRISONE) cream     cyanocobalamin (VITAMIN B-12) 1000 MCG tablet Take 2,000 mcg by mouth once daily.    ergocalciferol (ERGOCALCIFEROL) 50,000 unit Cap Take 50,000 Units by mouth every 7 days.    EScitalopram oxalate (LEXAPRO) 20 MG tablet Take 20 mg by mouth once daily.    estradiol valerate (DELESTROGEN) 20 mg/mL injection     febuxostat (ULORIC) 40 mg Tab Take 40 mg by mouth once daily.    ibuprofen (ADVIL,MOTRIN) 800 MG tablet Take 800 mg by mouth every 6 (six) hours as needed for Pain.    magnesium oxide (MAG-OX) 400 mg (241.3 mg magnesium) tablet Take 400 mg by mouth 2 (two) times daily.    ORENCIA CLICKJECT 125 mg/mL AtIn once a week.    oxyCODONE-acetaminophen (PERCOCET)  mg per tablet     phentermine (ADIPEX-P) 37.5 mg tablet     potassium chloride SA (K-DUR,KLOR-CON) 20 MEQ tablet     tiZANidine (ZANAFLEX) 4 MG tablet Take 4 mg by mouth once daily.     zafirlukast (ACCOLATE) 20 MG tablet Take 20 mg by mouth 2 (two) times daily.    HYDROcodone-acetaminophen (NORCO)  mg per tablet One p.o. Q 4 2 p.o. Q 6 p.r.n. pain left knee     Family History    None       Tobacco Use    Smoking status: Former Smoker    Smokeless tobacco: Never Used   Substance and Sexual Activity    Alcohol use: Not Currently    Drug use: Not Currently    Sexual activity: Not on file     Review of Systems   Constitutional:  Negative for appetite change, chills, fatigue, fever and unexpected weight change.   HENT:  Negative for congestion, mouth sores, nosebleeds, sinus pain, sore throat and trouble swallowing.    Respiratory:  Negative for apnea, cough, chest tightness and shortness of breath.    Cardiovascular:  Negative for chest pain, palpitations and leg swelling.   Gastrointestinal:  Negative for abdominal pain, blood in stool, constipation, diarrhea, nausea and vomiting.   Genitourinary:  Negative for decreased urine volume, difficulty urinating, dysuria and frequency.   Musculoskeletal:  Positive for arthralgias and joint swelling. Negative for back pain and neck pain.   Skin:  Negative for rash.   Neurological:  Negative for syncope, light-headedness and headaches.   Hematological:  Does not bruise/bleed easily.   Psychiatric/Behavioral:  Negative for confusion and suicidal ideas.    Objective:     Vital Signs (Most Recent):  Temp: 99 °F (37.2 °C) (05/20/22 0323)  Pulse: 83 (05/20/22 0323)  Resp: 20 (05/20/22 0419)  BP: 126/68 (05/20/22 0323)  SpO2: 98 % (05/20/22 0323) Vital Signs (24h Range):  Temp:  [97.8 °F (36.6 °C)-99.9 °F (37.7 °C)] 99 °F (37.2 °C)  Pulse:  [] 83  Resp:  [10-20] 20  SpO2:  [91 %-98 %] 98 %  BP: (111-173)/(42-90) 126/68     Weight: (!) 142 kg (313 lb)  Body mass index is 50.52 kg/m².    Physical Exam  Constitutional:       General: She is in acute distress.      Appearance: She is obese.   HENT:      Head: Atraumatic.      Mouth/Throat:      Mouth:  Mucous membranes are moist.      Pharynx: Oropharynx is clear.   Eyes:      Conjunctiva/sclera: Conjunctivae normal.      Pupils: Pupils are equal, round, and reactive to light.   Cardiovascular:      Rate and Rhythm: Normal rate and regular rhythm.      Pulses: Normal pulses.      Heart sounds: Normal heart sounds.   Pulmonary:      Effort: Pulmonary effort is normal.      Breath sounds: Normal breath sounds.   Abdominal:      General: Abdomen is flat. Bowel sounds are normal.      Palpations: Abdomen is soft.   Musculoskeletal:      Cervical back: Neck supple.      Right lower leg: No edema.      Left lower leg: No edema.   Skin:     General: Skin is warm and dry.      Capillary Refill: Capillary refill takes 2 to 3 seconds.      Coloration: Skin is not jaundiced or pale.      Findings: No bruising, lesion or rash.   Neurological:      General: No focal deficit present.      Mental Status: She is alert and oriented to person, place, and time.   Psychiatric:         Mood and Affect: Mood normal.       Significant Labs: All pertinent labs within the past 24 hours have been reviewed.    Significant Imaging: I have reviewed all pertinent imaging results/findings within the past 24 hours.    Assessment/Plan:     * Primary osteoarthritis of both knees  Agree with DVT prophylaxis and rehab.    Will give extra dose of pain medication IM at this time.        Epilepsy  Restarted home AED.        HTN (hypertension)  Restart home meds.        GAMAL (obstructive sleep apnea)  Restart home CPAP.  Bipap while in hospital.          VTE Risk Mitigation (From admission, onward)         Ordered     apixaban tablet 2.5 mg  2 times daily         05/19/22 1225     IP VTE HIGH RISK PATIENT  Once         05/19/22 1225     Place ASH hose  Until discontinued         05/19/22 1225     Place ASH hose  Until discontinued         05/19/22 0618     Place sequential compression device  Until discontinued         05/19/22 0618                     Thank you for your consult. I will follow-up with patient. Please contact us if you have any additional questions.  Dr. Frausto to follow while in hospital.      Lary Manjarrez MD  Department of Hospital Medicine   [facility] - Orthopedic

## 2022-05-20 NOTE — DISCHARGE SUMMARY
[facility]  Orthopedics  Discharge Summary      Patient Name: Marian Foster  MRN: [id number]  Admission Date: 5/19/2022  Hospital Length of Stay: 0 days  Discharge Date and Time:  05/20/2022 8:10 AM  Attending Physician: Gaurang Toussaint MD   Discharging Provider: SYLVIA Devine  Primary Care Provider: Diallo Sutton II, MD    HPI:   Chief complaint:  Right knee pain  History:Marian Foster is a 51 y.o. female seen for recheck of right knee pain.  She underwent left total knee replacement arthroplasty 12/07/2021.  She has done well aspect her left knee since that time.  She had progressive right knee pain refractory to conservative management.  X-rays of the right knee 09/24/2021 demonstrate evidence of tricompartmental degenerative changes.  For  Impression:  Severe DJD-right knee  Plan:  Patient offered right total knee replacement arthroplasty.  Procedure was discussed in detail using models and actual components.  The potential benefits and risks of surgery outlined to include but not limited to bleeding, infection, damage to blood vessels and nerves, need for further surgery, other risks and complications including even death and she wished to proceed.  We discussed overnight stay in the hospital.              Procedure(s) (LRB):  ARTHROPLASTY, KNEE, TOTAL, USING COMPUTER-ASSISTED NAVIGATION (Right)      Hospital Course:  Computer-aided right total knee replacement arthroplasty under general anesthesia regional block.  Hospital course was essentially uncomplicated.  T-max during hospital stay was 99.9° F. She is treated prophylactically with IV antibiotic therapy including vancomycin and Ancef.  She did not require blood transfusion.  Laboratory data at time of discharge showed hemoglobin 9.2 hematocrit 34.2.  She gradually improved was able to ambulate 15 ft with aid of a rolling walker.  [facility] was reviewed.  Patient was found.  She is noted to take Percocet 10 at home.  We  "will have her stop her Percocet 10 at this time.  We will begin Norco  p.o. q.4 hours or 2 p.o. 6 hours p.r.n. pain number 30 with no refills.  Any pain medication required after this she will obtain through her pain management provider.  She is noted to have a be on Tegretol.  There is a contraindication noted for Tegretol and Eliquis.  We will seek assistance with DVT prophylaxis with hospitalist.      Goals of Care Treatment Preferences:  Code Status: Full Code      Consults (From admission, onward)        Status Ordering Provider     Inpatient consult to Hospitalist  Once        Provider:  (Not yet assigned)    Acknowledged KAYA BROWN     IP consult case management/social work  Once        Provider:  (Not yet assigned)    Completed KAYA BROWN          Significant Diagnostic Studies: Labs: All labs within the past 24 hours have been reviewed    Pending Diagnostic Studies:     None        Final Active Diagnoses:    Diagnosis Date Noted POA    PRINCIPAL PROBLEM:  Primary osteoarthritis of both knees [M17.0] 10/04/2021 Yes     Chronic    Uncontrolled pain [R52]  Yes    Epilepsy [G40.909] 12/07/2021 Yes     Chronic    HTN (hypertension) [I10] 12/07/2021 Yes     Chronic    GAMAL (obstructive sleep apnea) [G47.33] 12/07/2021 Yes     Chronic      Problems Resolved During this Admission:      Discharged Condition: stable    Disposition: Home-Health Care [facility]    Follow Up:   Follow-up Information     SYLVIA Devine Follow up in 2 week(s).    Specialty: Emergency Medicine  Contact information:  [address]  [address]-[facility] [address]  [phone]                       Patient Instructions:      WALKER FOR HOME USE     Order Specific Question Answer Comments   Type of Walker: Adult (5'4"-6'6")    With wheels? No    Height: 5' 6" (1.676 m)    Weight: 142 kg (313 lb)    Length of need (1-99 months): 3    Does patient have medical equipment at home? 3-in-1 commode    Does patient have " "medical equipment at home? crutches, axillary    Please check all that apply: Patient's condition impairs ambulation.    Please check all that apply: Patient is unable to safely ambulate without equipment.      3 IN 1 COMMODE FOR HOME USE     Order Specific Question Answer Comments   Type: Standard    Height: 5' 6" (1.676 m)    Weight: 142 kg (313 lb)    Does patient have medical equipment at home? 3-in-1 commode    Does patient have medical equipment at home? crutches, axillary    Length of need (1-99 months): 3      Referral to Home health   Referral Priority: Routine Referral Type: Home Health   Referral Reason: Specialty Services Required   Requested Specialty: Home Health Services   Number of Visits Requested: 1     Keep surgical extremity elevated   Order Comments: Elevated at ankle, no pillow under knee.  Wear ASH hose, may remove twice a day for 1 hour then replace. Continue incentive spirometry every 2 hours while awake. Increase fluid intake. Continue Nozin nasal swab to nares twice a day until bottle is empty.     Ice to affected area   Order Comments: using barrier between ice and skin (specify duration&frequency). Apply Cool Jet to operative extremity.     No driving, operating heavy equipment or signing legal documents while taking pain medication     Change dressing (specify)   Order Comments: Swingbed / Home health nurse to remove hemovac on POD #2, then change dressing on post-op day #3.  Clean with chloraprep. Apply Aquacel AG dressing. Repeat dressing change in 7 days.  Notify physician of any wound drainage. DC staples in 2 weeks from surgery and steristrip incision     Call MD for:  temperature >100.4     Call MD for:  redness, tenderness, or signs of infection (pain, swelling, redness, odor or green/yellow discharge around incision site)     Activity as tolerated     Weight bearing as tolerated   Order Comments: With walker     Medications:  Reconciled Home Medications:      Medication List    "   CHANGE how you take these medications    HYDROcodone-acetaminophen  mg per tablet  Commonly known as: NORCO  One p.o. q.4 hours or 2 p.o. q.6 hours p.r.n. pain.  Do not take Percocet/oxycodone-acetaminophen while using hydrocodone with acetaminophen  What changed: additional instructions        CONTINUE taking these medications    * carBAMazepine 100 MG 12 hr tablet  Commonly known as: TEGRETOL XR  Take 200 mg by mouth once daily. Every am     * carBAMazepine 100 MG 12 hr tablet  Commonly known as: TEGRETOL XR  Take 100 mg by mouth once daily. At lunch time     * carBAMazepine 100 MG 12 hr tablet  Commonly known as: TEGRETOL XR  Take 200 mg by mouth nightly.     clotrimazole-betamethasone 1-0.05% cream  Commonly known as: LOTRISONE     ergocalciferol 50,000 unit Cap  Commonly known as: ERGOCALCIFEROL  Take 50,000 Units by mouth every 7 days.     EScitalopram oxalate 20 MG tablet  Commonly known as: LEXAPRO  Take 20 mg by mouth once daily.     estradiol valerate 20 mg/mL injection  Commonly known as: DELESTROGEN     febuxostat 40 mg Tab  Commonly known as: ULORIC  Take 40 mg by mouth once daily.     ibuprofen 800 MG tablet  Commonly known as: ADVIL,MOTRIN  Take 800 mg by mouth every 6 (six) hours as needed for Pain.     magnesium oxide 400 mg (241.3 mg magnesium) tablet  Commonly known as: MAG-OX  Take 400 mg by mouth 2 (two) times daily.     ORENCIA CLICKJECT 125 mg/mL Atin  Generic drug: abatacept  once a week.     oxyCODONE-acetaminophen  mg per tablet  Commonly known as: PERCOCET     phentermine 37.5 mg tablet  Commonly known as: ADIPEX-P     potassium chloride SA 20 MEQ tablet  Commonly known as: K-DUR,KLOR-CON     tiZANidine 4 MG tablet  Commonly known as: ZANAFLEX  Take 4 mg by mouth once daily.     VITAMIN B-12 1000 MCG tablet  Generic drug: cyanocobalamin  Take 2,000 mcg by mouth once daily.     zafirlukast 20 MG tablet  Commonly known as: ACCOLATE  Take 20 mg by mouth 2 (two) times daily.          * This list has 3 medication(s) that are the same as other medications prescribed for you. Read the directions carefully, and ask your doctor or other care provider to review them with you.                SYLVIA Devine  Orthopedics  [facility]

## 2022-05-20 NOTE — NURSING
Discharge instructions reveiwed with patient and copy given to patient, patient voiced understanding, of meds (eliquis, and jose quiroz). Jaime, incentive spirometry, dressing changes, signs and symptoms to report to md and home health, use of walker, crutches, bsc, and the following*Keep dressing dry and intact, do not remove dressing, if dressing becomes wet or bloody notify home health staff.  Home Health will remove your drain (if you have one) on post op day #2 and change your dressing on post op day #3, give them that special dressing we sent home with you.  *Continue incentive spirometry at least every 2 hours while awake.  *Continue white stockings remove 2 times a day for 1 hour and replace. Once dressing is changed, apply other stocking to surgery leg.   *Continue cool-jet to knee. Do not apply directly to skin.   *Take laxative of choice to have a bowel movement at least by tomorrow and then every other day.  *Increase fluids by mouth.  *Staples will be removed by home health/swingbed staff 2 weeks from surgery prior to follow up appoinment.  *Elevate surgery leg on pillow at ankle. No pillow under knees.  *Notify home health staff of any concerns.

## 2022-05-20 NOTE — NURSING
0719 received patient lying in bed, awake alert and oriented x4. bipap on. Pt states pain improved with dilaudid given per previous shift, pain level 6 at this time. Vitals stable, see flowsheet. Right knee immobilizer in place. hemovac emptied per previous shift. Pt requests pain meds when available. Left pt in bed, call bell near, safety measures in place.       1100 spoke with Laci, NP and patient about possible interaction between elequis and Carbamazepine, patient states she took both post op after last knee surgery without issues. See orders.

## 2022-05-20 NOTE — ASSESSMENT & PLAN NOTE
Agree with DVT prophylaxis and rehab.    Will give extra dose of pain medication IM at this time.

## 2022-05-21 ENCOUNTER — TELEPHONE (OUTPATIENT)
Dept: ORTHOPEDICS | Facility: HOSPITAL | Age: 52
End: 2022-05-21
Payer: MEDICARE

## 2022-05-21 NOTE — PROGRESS NOTES
[facility] Medicine  Progress Note    Patient Name: Marian Foster  MRN: [id number]  Patient Class: OP- Outpatient Recovery   Admission Date: 5/19/2022  Length of Stay: 0 days  Attending Physician: No att. providers found  Primary Care Provider: Diallo Sutton II, MD        Subjective:     Principal Problem:Primary osteoarthritis of both knees        HPI:  51 you F underwent RTKR today.  She had LTKR last year and said she didn't not have as much problem with pain because the nerve block was effective.  She states this time it was not.  She had spoken with the anesthesiologist on the way back to her room around 1100 yesterday afternoon after surgery and he had stated sometimes they do not work as well.  She is having a great deal of pain at present and is shaking.  Her nurse became concerned and when she saw her shaking a checked her chart.  Apparently patient has a h/o seizure disorder after a SDH in 1997.  She had missed her nighttime antiepileptic.  Apparently a consult had been placed for hospitalist to evaluate and restart home meds.  No consult was called.      I came to see patient immediately at bedside.  She had no postictal state and said she did not have a seizure she was just shaking because of the pain.  She states she has had no sleep due the pain and that is excruciating.  I was concerned due to the lack of sleep lowering her seizure threshold and restarted meds and went ahead and gave her nighttime AED stat and will get her back on her regular routine today.      She is very concerned about having to leave today.  She is afraid she will not be able to control the pain.        Overview/Hospital Course:  05/20 - records reviewed. RTKA with some increased pain. LTKR last year. Has h/o seizure disorder after a SDH in 1997. Hx HTN and COPD. Long use CPAP for GAMAL. Asked about risk of eliquis with tegretal. Pt on short course of two weeks of 1/2 dose eliquis. Tegretal can increase  strength eliquis. Consider dose already reduced and plan limited time of this combination would feel Ok to use and benefit would out way risk. To go home after therapy. Told to f/u with PCP in next few weeks.      Interval History:     Review of Systems   Constitutional:  Negative for appetite change, fatigue and fever.   HENT:  Negative for congestion, hearing loss and trouble swallowing.    Respiratory:  Negative for chest tightness, shortness of breath and wheezing.    Cardiovascular:  Negative for chest pain and palpitations.   Gastrointestinal:  Negative for abdominal pain, constipation and nausea.   Genitourinary:  Negative for difficulty urinating and dysuria.   Musculoskeletal:  Negative for back pain and neck stiffness.        Arthralgias in knee   Skin:  Negative for pallor and rash.   Neurological:  Negative for dizziness, speech difficulty and headaches.   Psychiatric/Behavioral:  Positive for sleep disturbance. Negative for confusion and suicidal ideas.    Objective:     Vital Signs (Most Recent):  Temp: 98.6 °F (37 °C) (05/20/22 0719)  Pulse: 80 (05/20/22 0719)  Resp: 17 (05/20/22 0748)  BP: 139/76 (05/20/22 0719)  SpO2: 97 % (05/20/22 0719) Vital Signs (24h Range):  Temp:  [98.6 °F (37 °C)-99.9 °F (37.7 °C)] 98.6 °F (37 °C)  Pulse:  [80-84] 80  Resp:  [17-20] 17  SpO2:  [97 %-98 %] 97 %  BP: (126-139)/(68-76) 139/76     Weight: (!) 142 kg (313 lb)  Body mass index is 50.52 kg/m².    Intake/Output Summary (Last 24 hours) at 5/20/2022 2305  Last data filed at 5/20/2022 0700  Gross per 24 hour   Intake --   Output 150 ml   Net -150 ml      Physical Exam  Vitals reviewed.   Constitutional:       General: She is not in acute distress.     Appearance: She is morbidly obese.   Cardiovascular:      Rate and Rhythm: Normal rate and regular rhythm.      Pulses: Normal pulses.   Pulmonary:      Effort: Pulmonary effort is normal. No respiratory distress.      Breath sounds: Normal breath sounds. No wheezing.    Abdominal:      General: Bowel sounds are normal. There is no distension.      Tenderness: There is no abdominal tenderness.   Musculoskeletal:      Comments: Post op changes posr knee replacement   Skin:     General: Skin is warm.   Neurological:      General: No focal deficit present.      Mental Status: She is alert, oriented to person, place, and time and easily aroused. Mental status is at baseline.   Psychiatric:         Mood and Affect: Mood normal.         Behavior: Behavior normal.       Significant Labs: All pertinent labs within the past 24 hours have been reviewed.  BMP:   Recent Labs   Lab 05/20/22  0548   *      K 3.9      CO2 25   BUN 12   CREATININE 0.71   CALCIUM 8.3*     CBC:   Recent Labs   Lab 05/19/22  1530 05/20/22  0548   WBC 18.03* 13.62*   HGB 12.4 11.2*   HCT 37.9* 34.2*    315     CMP:   Recent Labs   Lab 05/20/22  0548      K 3.9      CO2 25   *   BUN 12   CREATININE 0.71   CALCIUM 8.3*   PROT 6.4   ALBUMIN 3.3*   BILITOT 0.2   ALKPHOS 98   AST 27   ALT 42   ANIONGAP 17*   EGFRNONAA 92       Significant Imaging: I have reviewed all pertinent imaging results/findings within the past 24 hours.      Intake/Output - Last 3 Shifts         05/19 0700  05/20 0659 05/20 0700  05/21 0659    I.V. (mL/kg) 1500 (10.6)     IV Piggyback 250     Total Intake(mL/kg) 1750 (12.3)     Urine (mL/kg/hr) 0 (0)     Drains 250 50    Total Output 250 50    Net +1500 -50          Urine Occurrence 2 x                 Assessment/Plan:      * Primary osteoarthritis of both knees  Agree with DVT prophylaxis and rehab.    Will give extra dose of pain medication IM at this time.        Epilepsy  Restarted home AED.        HTN (hypertension)  Restart home meds.        GAMAL (obstructive sleep apnea)  Restart home CPAP.  Bipap while in hospital.          VTE Risk Mitigation (From admission, onward)         Ordered     IP VTE HIGH RISK PATIENT  Once         05/20/22 0805                 Discharge Planning   KAROL: 5/20/2022     Code Status: Prior   Is the patient medically ready for discharge?:     Reason for patient still in hospital (select all that apply): Laboratory test, Treatment and Imaging  Discharge Plan A: Home with family, Home Health   Discharge Delays: None known at this time              Randell Frausto MD  Department of Hospital Medicine   [facility] [place] - Orthopedic

## 2022-05-21 NOTE — TELEPHONE ENCOUNTER
Is your pain tolerable? yes      Has Home health therapy/outpatient therapy come to see you? yes      Are you taking your ecotrin/lovenox/eliquis? eliquis      Have you had a bowel movement since surgery?no Passing gas today and taking stool softners, advised to take a laxative of her choice if she does not have one by tonight      Are you wearing your ASH hose? yes      Are you doing ankle pumps?yes      Are you doing your incentive spirometry?yes      Any complaints/concerns? none

## 2022-05-21 NOTE — SUBJECTIVE & OBJECTIVE
Interval History:     Review of Systems   Constitutional:  Negative for appetite change, fatigue and fever.   HENT:  Negative for congestion, hearing loss and trouble swallowing.    Respiratory:  Negative for chest tightness, shortness of breath and wheezing.    Cardiovascular:  Negative for chest pain and palpitations.   Gastrointestinal:  Negative for abdominal pain, constipation and nausea.   Genitourinary:  Negative for difficulty urinating and dysuria.   Musculoskeletal:  Negative for back pain and neck stiffness.        Arthralgias in knee   Skin:  Negative for pallor and rash.   Neurological:  Negative for dizziness, speech difficulty and headaches.   Psychiatric/Behavioral:  Positive for sleep disturbance. Negative for confusion and suicidal ideas.    Objective:     Vital Signs (Most Recent):  Temp: 98.6 °F (37 °C) (05/20/22 0719)  Pulse: 80 (05/20/22 0719)  Resp: 17 (05/20/22 0748)  BP: 139/76 (05/20/22 0719)  SpO2: 97 % (05/20/22 0719) Vital Signs (24h Range):  Temp:  [98.6 °F (37 °C)-99.9 °F (37.7 °C)] 98.6 °F (37 °C)  Pulse:  [80-84] 80  Resp:  [17-20] 17  SpO2:  [97 %-98 %] 97 %  BP: (126-139)/(68-76) 139/76     Weight: (!) 142 kg (313 lb)  Body mass index is 50.52 kg/m².    Intake/Output Summary (Last 24 hours) at 5/20/2022 2305  Last data filed at 5/20/2022 0700  Gross per 24 hour   Intake --   Output 150 ml   Net -150 ml      Physical Exam  Vitals reviewed.   Constitutional:       General: She is not in acute distress.     Appearance: She is morbidly obese.   Cardiovascular:      Rate and Rhythm: Normal rate and regular rhythm.      Pulses: Normal pulses.   Pulmonary:      Effort: Pulmonary effort is normal. No respiratory distress.      Breath sounds: Normal breath sounds. No wheezing.   Abdominal:      General: Bowel sounds are normal. There is no distension.      Tenderness: There is no abdominal tenderness.   Musculoskeletal:      Comments: Post op changes posr knee replacement   Skin:      General: Skin is warm.   Neurological:      General: No focal deficit present.      Mental Status: She is alert, oriented to person, place, and time and easily aroused. Mental status is at baseline.   Psychiatric:         Mood and Affect: Mood normal.         Behavior: Behavior normal.       Significant Labs: All pertinent labs within the past 24 hours have been reviewed.  BMP:   Recent Labs   Lab 05/20/22  0548   *      K 3.9      CO2 25   BUN 12   CREATININE 0.71   CALCIUM 8.3*     CBC:   Recent Labs   Lab 05/19/22  1530 05/20/22  0548   WBC 18.03* 13.62*   HGB 12.4 11.2*   HCT 37.9* 34.2*    315     CMP:   Recent Labs   Lab 05/20/22  0548      K 3.9      CO2 25   *   BUN 12   CREATININE 0.71   CALCIUM 8.3*   PROT 6.4   ALBUMIN 3.3*   BILITOT 0.2   ALKPHOS 98   AST 27   ALT 42   ANIONGAP 17*   EGFRNONAA 92       Significant Imaging: I have reviewed all pertinent imaging results/findings within the past 24 hours.      Intake/Output - Last 3 Shifts         05/19 0700  05/20 0659 05/20 0700  05/21 0659    I.V. (mL/kg) 1500 (10.6)     IV Piggyback 250     Total Intake(mL/kg) 1750 (12.3)     Urine (mL/kg/hr) 0 (0)     Drains 250 50    Total Output 250 50    Net +1500 -50          Urine Occurrence 2 x

## 2022-05-21 NOTE — HOSPITAL COURSE
05/20 - records reviewed. RTKA with some increased pain. LTKR last year. Has h/o seizure disorder after a SDH in 1997. Hx HTN and COPD. Long use CPAP for GAMAL. Asked about risk of eliquis with tegretal. Pt on short course of two weeks of 1/2 dose eliquis. Tegretal can increase strength eliquis. Consider dose already reduced and plan limited time of this combination would feel Ok to use and benefit would out way risk. To go home after therapy. Told to f/u with PCP in next few weeks.

## 2022-05-23 ENCOUNTER — TELEPHONE (OUTPATIENT)
Dept: ORTHOPEDICS | Facility: HOSPITAL | Age: 52
End: 2022-05-23
Payer: MEDICARE

## 2022-05-23 NOTE — TELEPHONE ENCOUNTER
Is your pain tolerable? yes      Has Home health therapy/outpatient therapy come to see you? yes      Are you taking your ecotrin/lovenox/eliquis? eliquis      Have you had a bowel movement since surgery? yes      Are you wearing your ASH hose? yes      Are you doing ankle pumps?yes      Are you doing your incentive spirometry?yes      Any complaints/concerns? none

## 2022-06-09 DIAGNOSIS — Z96.652 STATUS POST TOTAL LEFT KNEE REPLACEMENT: Primary | ICD-10-CM

## 2022-06-09 PROBLEM — Z96.651 STATUS POST TOTAL RIGHT KNEE REPLACEMENT: Status: ACTIVE | Noted: 2022-06-09

## 2022-06-09 RX ORDER — HYDROCODONE BITARTRATE AND ACETAMINOPHEN 10; 325 MG/1; MG/1
1 TABLET ORAL EVERY 6 HOURS PRN
Qty: 24 TABLET | Refills: 0 | Status: SHIPPED | OUTPATIENT
Start: 2022-06-09 | End: 2022-08-30

## 2022-06-09 NOTE — PROGRESS NOTES
Spoke with SYLVIA Harris Dr. Partial pain treatment.  She agreed to giving patient Norco  p.o. q.6 hours p.r.n. left knee pain number 24 with no refills.

## 2022-08-30 ENCOUNTER — HOSPITAL ENCOUNTER (OUTPATIENT)
Dept: RADIOLOGY | Facility: HOSPITAL | Age: 52
Discharge: HOME | End: 2022-08-30
Attending: NURSE PRACTITIONER
Payer: MEDICARE

## 2022-08-30 DIAGNOSIS — Z96.651 S/P TKR (TOTAL KNEE REPLACEMENT), RIGHT: ICD-10-CM

## 2022-08-30 DIAGNOSIS — Z96.652 S/P TKR (TOTAL KNEE REPLACEMENT), LEFT: ICD-10-CM

## 2022-08-30 DIAGNOSIS — Z96.652 S/P TKR (TOTAL KNEE REPLACEMENT), LEFT: Primary | ICD-10-CM

## 2022-08-30 PROCEDURE — 73562 X-RAY EXAM OF KNEE 3: CPT | Mod: TC,RT

## 2022-08-30 PROCEDURE — 73562 XR KNEE 3 VIEW RIGHT: ICD-10-PCS | Mod: 26,RT,, | Performed by: RADIOLOGY

## 2022-08-30 PROCEDURE — 73562 X-RAY EXAM OF KNEE 3: CPT | Mod: 26,RT,, | Performed by: RADIOLOGY

## 2022-11-30 ENCOUNTER — HOSPITAL ENCOUNTER (OUTPATIENT)
Dept: RADIOLOGY | Facility: HOSPITAL | Age: 52
Discharge: HOME | End: 2022-11-30
Attending: ORTHOPAEDIC SURGERY
Payer: MEDICARE

## 2022-11-30 DIAGNOSIS — Z47.89 ORTHOPEDIC AFTERCARE: ICD-10-CM

## 2022-11-30 PROBLEM — Z96.653 HISTORY OF TOTAL BILATERAL KNEE REPLACEMENT (TKR): Status: ACTIVE | Noted: 2022-06-09

## 2022-11-30 PROCEDURE — 73562 X-RAY EXAM OF KNEE 3: CPT | Mod: TC,50

## 2023-01-10 ENCOUNTER — HOSPITAL ENCOUNTER (OUTPATIENT)
Dept: RADIOLOGY | Facility: HOSPITAL | Age: 53
Discharge: HOME | End: 2023-01-10
Attending: NURSE PRACTITIONER
Payer: COMMERCIAL

## 2023-01-10 ENCOUNTER — OFFICE VISIT (OUTPATIENT)
Dept: ORTHOPEDICS | Facility: CLINIC | Age: 53
End: 2023-01-10
Payer: COMMERCIAL

## 2023-01-10 DIAGNOSIS — Z96.653 HISTORY OF TOTAL KNEE ARTHROPLASTY, BILATERAL: Primary | ICD-10-CM

## 2023-01-10 DIAGNOSIS — Z96.653 HISTORY OF TOTAL KNEE ARTHROPLASTY, BILATERAL: ICD-10-CM

## 2023-01-10 PROCEDURE — 99213 OFFICE O/P EST LOW 20 MIN: CPT | Mod: S$PBB,,, | Performed by: NURSE PRACTITIONER

## 2023-01-10 PROCEDURE — 1160F PR REVIEW ALL MEDS BY PRESCRIBER/CLIN PHARMACIST DOCUMENTED: ICD-10-PCS | Mod: CPTII,,, | Performed by: NURSE PRACTITIONER

## 2023-01-10 PROCEDURE — 99212 OFFICE O/P EST SF 10 MIN: CPT | Mod: PBBFAC | Performed by: NURSE PRACTITIONER

## 2023-01-10 PROCEDURE — 73562 XR KNEE 3 VIEW BILATERAL: ICD-10-PCS | Mod: 26,50,, | Performed by: RADIOLOGY

## 2023-01-10 PROCEDURE — 1159F PR MEDICATION LIST DOCUMENTED IN MEDICAL RECORD: ICD-10-PCS | Mod: CPTII,,, | Performed by: NURSE PRACTITIONER

## 2023-01-10 PROCEDURE — 99213 PR OFFICE/OUTPT VISIT, EST, LEVL III, 20-29 MIN: ICD-10-PCS | Mod: S$PBB,,, | Performed by: NURSE PRACTITIONER

## 2023-01-10 PROCEDURE — 73562 X-RAY EXAM OF KNEE 3: CPT | Mod: TC,50

## 2023-01-10 PROCEDURE — 1160F RVW MEDS BY RX/DR IN RCRD: CPT | Mod: CPTII,,, | Performed by: NURSE PRACTITIONER

## 2023-01-10 PROCEDURE — 73562 X-RAY EXAM OF KNEE 3: CPT | Mod: 26,50,, | Performed by: RADIOLOGY

## 2023-01-10 PROCEDURE — 1159F MED LIST DOCD IN RCRD: CPT | Mod: CPTII,,, | Performed by: NURSE PRACTITIONER

## 2023-01-10 NOTE — PROGRESS NOTES
52-year-old female presents ambulatory to orthopedic clinic re-evaluation bilateral knees.  She is known to orthopedic clinic having undergone left total knee replacement arthroplasty on 12/07/2021 right total knee replacement arthroplasty on 05/19/2022.  She is currently undergoing physical therapy efforts restoration of motion.  States she feels she is getting better with therapy.  She wishes to continue physical therapy.  She denies injury, trauma, fever, chills or any sign or symptom of infection.      X-ray:  X-rays today of bilateral knees AP, lateral and sunrise view reviewed by Dr. Toussaint.      Review of the x-ray total knee components are noted bilaterally.  There is no obvious loosening of the components.  There is no evidence of acute fracture, dislocation or pathologic bone noted.      PE: She is noted be ambulating independently with no perceptible limp.  Physical exam right knee skin is warm, dry and intact.  Well-healed anterior midline surgical site is noted.  Range of motion right knee is 0° extension further flexion approximately 105°.  There was excellent ligamentous balance instability noted clinically.  No intra-articular effusion appreciated clinically.  No soft tissue swelling noted.  Physical exam left knee skin is warm, dry and intact.  Anterior midline surgical site is noted.  Range of motion of left knee 0° extension with further flexion to approximately 90°.  There was excellent ligamentous balance instability noted clinically.  No soft tissue swelling noted.  No intra-articular effusion appreciated clinically.      Impression:  History of left total knee replacement arthroplasty 10/07/2021 and right total knee replacement arthroplasty 05/19/2022.      Plan:  Safety guidelines and activity restrictions are discussed with the patient.  She verbalized understanding.  May continue physical therapy efforts for motion.  Have return orthopedic clinic in May of 2023 follow-up Dr. Toussaint  with bilateral knee x-rays or sooner as needed.

## 2023-01-10 NOTE — PATIENT INSTRUCTIONS
Safety guidelines and activity restrictions are discussed with the patient.  She verbalized understanding.  May continue physical therapy efforts for motion.  Have return orthopedic clinic in May of 2023 follow-up Dr. Toussaint with bilateral knee x-rays or sooner as needed   31-Aug-2021 17:45

## 2023-05-02 DIAGNOSIS — Z96.653 HISTORY OF TOTAL KNEE ARTHROPLASTY, BILATERAL: Primary | ICD-10-CM

## 2023-05-10 ENCOUNTER — HOSPITAL ENCOUNTER (OUTPATIENT)
Dept: RADIOLOGY | Facility: HOSPITAL | Age: 53
Discharge: HOME | End: 2023-05-10
Attending: NURSE PRACTITIONER
Payer: COMMERCIAL

## 2023-05-10 DIAGNOSIS — M25.561 KNEE PAIN, BILATERAL: Primary | ICD-10-CM

## 2023-05-10 DIAGNOSIS — M25.562 BILATERAL KNEE PAIN: ICD-10-CM

## 2023-05-10 DIAGNOSIS — M25.561 BILATERAL KNEE PAIN: ICD-10-CM

## 2023-05-10 DIAGNOSIS — M25.562 KNEE PAIN, BILATERAL: Primary | ICD-10-CM

## 2023-05-10 PROCEDURE — 73560 X-RAY EXAM OF KNEE 1 OR 2: CPT | Mod: 26,50,, | Performed by: RADIOLOGY

## 2023-05-10 PROCEDURE — 73560 XR KNEE 1 OR 2 VIEW BILATERAL: ICD-10-PCS | Mod: 26,50,, | Performed by: RADIOLOGY

## 2023-05-10 PROCEDURE — 73560 X-RAY EXAM OF KNEE 1 OR 2: CPT | Mod: TC,50

## 2023-05-22 ENCOUNTER — CLINICAL SUPPORT (OUTPATIENT)
Dept: REHABILITATION | Facility: HOSPITAL | Age: 53
End: 2023-05-22
Payer: COMMERCIAL

## 2023-05-22 DIAGNOSIS — Z96.653 HISTORY OF TOTAL KNEE ARTHROPLASTY, BILATERAL: Primary | ICD-10-CM

## 2023-05-22 DIAGNOSIS — M25.662 DECREASED RANGE OF MOTION (ROM) OF BOTH KNEES: ICD-10-CM

## 2023-05-22 DIAGNOSIS — M25.661 DECREASED RANGE OF MOTION (ROM) OF BOTH KNEES: ICD-10-CM

## 2023-05-22 DIAGNOSIS — M62.81 QUADRICEPS WEAKNESS: ICD-10-CM

## 2023-05-22 DIAGNOSIS — M25.562 KNEE PAIN, BILATERAL: ICD-10-CM

## 2023-05-22 DIAGNOSIS — R26.9 GAIT DISTURBANCE: ICD-10-CM

## 2023-05-22 DIAGNOSIS — M25.561 KNEE PAIN, BILATERAL: ICD-10-CM

## 2023-05-22 PROCEDURE — 97161 PT EVAL LOW COMPLEX 20 MIN: CPT

## 2023-05-22 PROCEDURE — 97110 THERAPEUTIC EXERCISES: CPT

## 2023-05-22 NOTE — PLAN OF CARE
[facility]  Physical Therapy Initial Evaluation    Name: Marian Foster  Clinic Number: [id number]    Therapy Diagnosis:   Encounter Diagnosis   Name Primary?    Knee pain, bilateral      Physician: Bia Rosales FNP    Physician Orders: PT Eval and Treat   Medical Diagnosis from Referral: see above  Evaluation Date: 5/22/2023  Authorization Period Expiration: 7/22/2023  Plan of Care Expiration: 7/7/2023    Visit # / Visits authorized: 1/ 13    Time In: 10:48 am  Time Out: 11:28 am  Total Appointment Time (timed & untimed codes): 40 minutes    Precautions: Standard    Subjective   Date of onset: chronic  History of current condition - Marian reports: h/o bilateral knee replacements - reports she has osteoarthritis, psoriatic arthritis, fibromyalgia, and pseudo-gout - knees hurt all the time - says the knee replacements did not help her pain - say they hurt more now than they did before - left knee hurts worse - says she has gained a lot of weight (over 50#) since her knee surgeries - 12/19/21 left knee done, 5/11/22 right knee done - says she went to sit on a low toilet and kind of lost her balance and went down quickly and felt like some scar tissue in her right knee - ever since she says she can bend it better and it is not as swollen -      Medical History:   Past Medical History:   Diagnosis Date    Arthritis     COPD (chronic obstructive pulmonary disease)     Disorder of kidney and ureter     Hypertension     Liver disease     Osteoarthritis     Seizures     Sleep apnea        Surgical History:   Marian Foster  has a past surgical history that includes Abdominal surgery; Knee arthroscopy; Tonsillectomy; Hysterectomy; Cholecystectomy; Appendectomy; Laparoscopy; Breast surgery; lithrotripsy; Ganglion cyst excision (Left); Trigger finger release (Right); and Myringotomy w/ tubes (Left).    Medications:   Marian has a current medication list which includes the following prescription(s): apixaban,  carbamazepine, carbamazepine, carbamazepine, clotrimazole-betamethasone 1-0.05%, cyanocobalamin, ergocalciferol, escitalopram oxalate, estradiol valerate, febuxostat, ibuprofen, magnesium oxide, orencia clickject, oxycodone-acetaminophen, phentermine, potassium chloride sa, tizanidine, and zafirlukast.    Allergies:   Review of patient's allergies indicates:   Allergen Reactions    Adhesive tape-silicones     Demerol [meperidine]     Erythromycin     Prozac [fluoxetine]     Tetracyclines         Imaging, xrays    Prior Therapy: has had home health physical therapy about 4 months ago  Social History:  lives alone  Occupation: not working  Prior Level of Function: independent   Current Level of Function: modified independent - does the best she can    Pain:  Current 6/10, worst 9/10, best 4/10   Location: bilateral knee   Description: Aching, Throbbing, and stiffness  Aggravating Factors: prolonged Sitting, prolonged Standing, and Getting out of bed/chair  Easing Factors: pain medication, ice, and stretching/moving around    Pts goals: wants to get back to walking at [employer] and be able to play with her granddaughter    Objective       Range of motion:  Motion Right Left    Knee extension   -5 degrees    -5 degrees    Knee flexion   102 degrees    87 degrees        Manual muscle test   Muscle Right  Left    Knee extension  MMT strength: 4/5  MMT strength: 4-/5   Knee flexion  MMT strength: 4+/5  MMT strength: 4/5       Gait:  Weight bearing precautions: FWB  Assistive device: none  Ambulation distance and deviations:  Stairs: no stairs at home    Comments:    Limitation/Restriction for FOTO Knee Survey    Therapist reviewed FOTO scores for Marian Foster on 5/22/2023.   FOTO documents entered into [employer] - see Media section.    Intake Score: 31         TREATMENT     Total Treatment time (time-based codes) separate from Evaluation: 8 minutes    Marian received the treatments listed below:  Straight leg raises, bridging,  calf stretch with towel, and long arc quad on both lower extremities    Home Exercises and Patient Education Provided    Education provided:   - evaluation results, plan of care and home exercise program     Written Home Exercises Provided: yes.  Exercises were reviewed and Marian was able to demonstrate them prior to the end of the session.  Marian demonstrated good  understanding of the education provided.     See EMR under Patient Instructions for exercises provided 5/22/2023.    Assessment   Marian is a 52 y.o. female referred to outpatient Physical Therapy with a medical diagnosis of bilateral knee pain. Pt presents with complaints of bilateral knee pain. She states she has osteoarthritis, psoriatic arthritis, pseudo gout and fibromyalgia. She has had both knees replaced and states it did not help her pain. She has less range of motion and more pain in the left knee compared to the right. She says prolonged sitting and standing exacerbate her pain. She states she has gained about 50# since her knee surgeries. She would like to be able to get back to walking at [place] for exercise and has a granddaughter she wants to be able to play with.     Pt prognosis is Fair.   Pt will benefit from skilled outpatient Physical Therapy to address the deficits stated above and in the chart below, provide pt/family education, and to maximize pt's level of independence.     Plan of care discussed with patient: Yes  Pt's spiritual, cultural and educational needs considered and patient is agreeable to the plan of care and goals as stated below:     Anticipated Barriers for therapy: h/o bilateral knee replacements and multiple comorbidities      Goals:  SHORT TERM GOALS  1.  Patient to be independent with home exercise program to facilitate carryover between therapy visits.  2.  Patient will have 0-105 degrees range of motion bilateral knees for improved mobility.  3.  Patient will perform straight leg raise without quad lag for  increased quad control.  4.  Patient will increase manual muscle test of bilateral lower extremities to 4+ to 5/5 for increased stability with gait and activiites of daily living.    LONG TERM GOALS  1.  Patient will go up/down stairs reciprocal pattern with handrails and good eccentric control on both lower extremities.  2.  Patient will ambulate independent with improved extension during stance phase and less antalgia and pain.  3.  Patient will be able to go walking at [place] for exercise and play with her granddaughter for improved quality of life.     Plan   Plan of care Certification: 5/22/2023 to 7/7/2023.    Outpatient Physical Therapy 2 times weekly for 6 weeks to include the following interventions: Electrical Stimulation NMES, Gait Training, Manual Therapy, Moist Heat/ Ice, Neuromuscular Re-ed, Patient Education, Therapeutic Activities, and Therapeutic Exercise.     LIOR GENTILE, PT      I CERTIFY THE NEED FOR THESE SERVICES FURNISHED UNDER THIS PLAN OF TREATMENT AND WHILE UNDER MY CARE.    Physician's comments:      Physician's Signature: _________________________________________  Date: __________________________

## 2023-05-23 PROBLEM — M62.81 QUADRICEPS WEAKNESS: Status: ACTIVE | Noted: 2023-05-23

## 2023-05-23 PROBLEM — M25.661 DECREASED RANGE OF MOTION (ROM) OF BOTH KNEES: Status: ACTIVE | Noted: 2023-05-23

## 2023-05-23 PROBLEM — M25.662 DECREASED RANGE OF MOTION (ROM) OF BOTH KNEES: Status: ACTIVE | Noted: 2023-05-23

## 2023-05-23 PROBLEM — M25.562 KNEE PAIN, BILATERAL: Status: ACTIVE | Noted: 2023-05-23

## 2023-05-23 PROBLEM — R26.9 GAIT DISTURBANCE: Status: ACTIVE | Noted: 2023-05-23

## 2023-05-23 PROBLEM — M25.561 KNEE PAIN, BILATERAL: Status: ACTIVE | Noted: 2023-05-23

## 2023-06-06 ENCOUNTER — CLINICAL SUPPORT (OUTPATIENT)
Dept: REHABILITATION | Facility: HOSPITAL | Age: 53
End: 2023-06-06
Payer: COMMERCIAL

## 2023-06-06 DIAGNOSIS — G89.29 CHRONIC PAIN OF BOTH KNEES: Primary | ICD-10-CM

## 2023-06-06 DIAGNOSIS — M25.562 CHRONIC PAIN OF BOTH KNEES: Primary | ICD-10-CM

## 2023-06-06 DIAGNOSIS — M25.561 CHRONIC PAIN OF BOTH KNEES: Primary | ICD-10-CM

## 2023-06-06 DIAGNOSIS — M25.661 DECREASED RANGE OF MOTION (ROM) OF BOTH KNEES: ICD-10-CM

## 2023-06-06 DIAGNOSIS — M25.662 DECREASED RANGE OF MOTION (ROM) OF BOTH KNEES: ICD-10-CM

## 2023-06-06 DIAGNOSIS — R26.9 GAIT DISTURBANCE: ICD-10-CM

## 2023-06-06 DIAGNOSIS — Z96.653 HISTORY OF TOTAL KNEE ARTHROPLASTY, BILATERAL: ICD-10-CM

## 2023-06-06 DIAGNOSIS — M62.81 QUADRICEPS WEAKNESS: ICD-10-CM

## 2023-06-06 PROCEDURE — 97110 THERAPEUTIC EXERCISES: CPT | Mod: CQ

## 2023-06-06 PROCEDURE — 97112 NEUROMUSCULAR REEDUCATION: CPT | Mod: CQ

## 2023-06-06 NOTE — PROGRESS NOTES
[facility]   Physical Therapy Treatment Note     Name: Marian Foster  Clinic Number: [id number]    Therapy Diagnosis:   Encounter Diagnoses   Name Primary?    Chronic pain of both knees Yes    Decreased range of motion (ROM) of both knees     Gait disturbance     Quadriceps weakness     History of total knee arthroplasty, bilateral      Physician: Bia Rosales FNP    Visit Date: 6/6/2023  Physician: Bia Rosales FNP     Physician Orders: PT Eval and Treat   Medical Diagnosis from Referral: see above  Evaluation Date: 5/22/2023  Authorization Period Expiration: 7/22/2023  Plan of Care Expiration: 7/7/2023     Visit # / Visits authorized: 2/ 13    PTA Visit #: 1/5     Time In: 7:50 am   Time Out: 8:30 am   Total Billable Time: 40 minutes    SUBJECTIVE     Pt reports: I am always in pain, but I will be okay.  She was compliant with home exercise program.  Response to previous treatment: first treatment after evaluation   Functional change: none    Pain: 5/10  Location: bilateral knee      OBJECTIVE     Objective Measures updated at progress report unless specified.     Treatment     Marian received the treatments listed below:      therapeutic exercises to develop strength, endurance, posture, and core stabilization for 10 minutes including:  NuStep 5 minutes  Slant board 3 x 30 second hold   Hamstring stretch 3 x 30 second hold   Cybex hamstring curls 3 x 10 5#  Supine kicks 3 x 10     neuromuscular re-education activities to improve: Balance, Coordination, Proprioception, and Posture for 30 minutes. The following activities were included:  Bridges 2 x 10   Standing marches 2 x 10   Standing hip abduction 3 x 10  Step ups 2 x 10   Calf raises 3 x 10     therapeutic activities to improve functional performance for -  minutes, including:        Patient Education and Home Exercises     Home Exercises Provided and Patient Education Provided     Education provided:   - encouraged  to continue home exercise program     Written Home Exercises Provided: Patient instructed to cont prior HEP. Exercises were reviewed and Marian was able to demonstrate them prior to the end of the session.  Marian demonstrated good  understanding of the education provided. See EMR under Patient Instructions for exercises provided during therapy sessions    ASSESSMENT     Case conference with shawn Kilgore DPT. Patient was informed on correct body mechanics today, to perform exercises correctly. Patient did well today and fatigued with exercises but was very motivated. Patient states that she wants to loose 50 pounds. Will continue to progress patient as tolerated.     Marian Is progressing towards her goals.   Pt prognosis is Good.     Pt will continue to benefit from skilled outpatient physical therapy to address the deficits listed in the problem list box on initial evaluation, provide pt/family education and to maximize pt's level of independence in the home and community environment.     Pt's spiritual, cultural and educational needs considered and pt agreeable to plan of care and goals.     Anticipated barriers to physical therapy: chronic knee pain     Goals:   SHORT TERM GOALS  1.  Patient to be independent with home exercise program to facilitate carryover between therapy visits.  2.  Patient will have 0-105 degrees range of motion bilateral knees for improved mobility.  3.  Patient will perform straight leg raise without quad lag for increased quad control.  4.  Patient will increase manual muscle test of bilateral lower extremities to 4+ to 5/5 for increased stability with gait and activiites of daily living.     LONG TERM GOALS  1.  Patient will go up/down stairs reciprocal pattern with handrails and good eccentric control on both lower extremities.  2.  Patient will ambulate independent with improved extension during stance phase and less antalgia and pain.  3.  Patient will be able to go walking at [place]  for exercise and play with her granddaughter for improved quality of life.     PLAN     Plan of care Certification: 5/22/2023 to 7/7/2023.     Outpatient Physical Therapy 2 times weekly for 6 weeks to include the following interventions: Electrical Stimulation NMES, Gait Training, Manual Therapy, Moist Heat/ Ice, Neuromuscular Re-ed, Patient Education, Therapeutic Activities, and Therapeutic Exercise.     Nita Carney, PTA   06/06/2023

## 2023-06-08 ENCOUNTER — CLINICAL SUPPORT (OUTPATIENT)
Dept: REHABILITATION | Facility: HOSPITAL | Age: 53
End: 2023-06-08
Payer: COMMERCIAL

## 2023-06-08 DIAGNOSIS — R26.9 GAIT DISTURBANCE: ICD-10-CM

## 2023-06-08 DIAGNOSIS — G89.29 CHRONIC PAIN OF BOTH KNEES: Primary | ICD-10-CM

## 2023-06-08 DIAGNOSIS — Z96.653 HISTORY OF TOTAL KNEE ARTHROPLASTY, BILATERAL: ICD-10-CM

## 2023-06-08 DIAGNOSIS — M25.662 DECREASED RANGE OF MOTION (ROM) OF BOTH KNEES: ICD-10-CM

## 2023-06-08 DIAGNOSIS — M62.81 QUADRICEPS WEAKNESS: ICD-10-CM

## 2023-06-08 DIAGNOSIS — M25.561 CHRONIC PAIN OF BOTH KNEES: Primary | ICD-10-CM

## 2023-06-08 DIAGNOSIS — M25.562 CHRONIC PAIN OF BOTH KNEES: Primary | ICD-10-CM

## 2023-06-08 DIAGNOSIS — M25.661 DECREASED RANGE OF MOTION (ROM) OF BOTH KNEES: ICD-10-CM

## 2023-06-08 PROCEDURE — 97110 THERAPEUTIC EXERCISES: CPT | Mod: CQ

## 2023-06-08 PROCEDURE — 97112 NEUROMUSCULAR REEDUCATION: CPT | Mod: CQ

## 2023-06-08 NOTE — PROGRESS NOTES
[facility]   Physical Therapy Treatment Note     Name: Marian Foster  Clinic Number: [id number]    Therapy Diagnosis:   Encounter Diagnoses   Name Primary?    Chronic pain of both knees Yes    Decreased range of motion (ROM) of both knees     Gait disturbance     Quadriceps weakness     History of total knee arthroplasty, bilateral      Physician: Bia Rosales FNP    Visit Date: 6/8/2023  Physician: Bia Rosales FNP     Physician Orders: PT Eval and Treat   Medical Diagnosis from Referral: see above  Evaluation Date: 5/22/2023  Authorization Period Expiration: 7/22/2023  Plan of Care Expiration: 7/7/2023     Visit # / Visits authorized: 3/ 13    PTA Visit #: 2/5     Time In: 7:40 am   Time Out: 8:25 am   Total Billable Time: 45 minutes    SUBJECTIVE     Pt reports: she was able to stay out of bed longer and that she has not had many craps.  She was compliant with home exercise program.  Response to previous treatment: something is working, she was able to stay up longer  Functional change: none    Pain: 6/10  Location: bilateral knee      OBJECTIVE     Objective Measures updated at progress report unless specified.     Treatment     Marian received the treatments listed below:      therapeutic exercises to develop strength, endurance, posture, and core stabilization for 15 minutes including:  NuStep 5 minutes  Slant board 3 x 30 second hold   Hamstring stretch 3 x 30 second hold   Cybex hamstring curls 3 x 10 5#  Supine kicks 3 x 10   Squats 2 x 10     neuromuscular re-education activities to improve: Balance, Coordination, Proprioception, and Posture for 30 minutes. The following activities were included:  Bridges 2 x 10   Cybex hip flexion 2 x 10   Cybex hip abduction 3 x 10  Step ups 3 x 10   Calf raises 3 x 10     therapeutic activities to improve functional performance for -  minutes, including:        Patient Education and Home Exercises     Home Exercises Provided  and Patient Education Provided     Education provided:   - encouraged to continue home exercise program     Written Home Exercises Provided: Patient instructed to cont prior HEP. Exercises were reviewed and Marian was able to demonstrate them prior to the end of the session.  Marian demonstrated good  understanding of the education provided. See EMR under Patient Instructions for exercises provided during therapy sessions    ASSESSMENT     Case conference with shawn Kilgore DPT. Patient was informed on correct body mechanics today, to perform exercises correctly. Patient did well today and fatigued with exercises but was very motivated. Patient do not want to take rest breaks and is instructed too slow down and to rest as needed. Progress from standing marches and hip abduction to Cybex machine. Will continue to progress patient as tolerated.     Marian Is progressing towards her goals.   Pt prognosis is Good.     Pt will continue to benefit from skilled outpatient physical therapy to address the deficits listed in the problem list box on initial evaluation, provide pt/family education and to maximize pt's level of independence in the home and community environment.     Pt's spiritual, cultural and educational needs considered and pt agreeable to plan of care and goals.     Anticipated barriers to physical therapy: chronic knee pain     Goals:   SHORT TERM GOALS  1.  Patient to be independent with home exercise program to facilitate carryover between therapy visits.  2.  Patient will have 0-105 degrees range of motion bilateral knees for improved mobility.  3.  Patient will perform straight leg raise without quad lag for increased quad control.  4.  Patient will increase manual muscle test of bilateral lower extremities to 4+ to 5/5 for increased stability with gait and activiites of daily living.     LONG TERM GOALS  1.  Patient will go up/down stairs reciprocal pattern with handrails and good eccentric control on  both lower extremities.  2.  Patient will ambulate independent with improved extension during stance phase and less antalgia and pain.  3.  Patient will be able to go walking at [place] for exercise and play with her granddaughter for improved quality of life.     PLAN     Plan of care Certification: 5/22/2023 to 7/7/2023.     Outpatient Physical Therapy 2 times weekly for 6 weeks to include the following interventions: Electrical Stimulation NMES, Gait Training, Manual Therapy, Moist Heat/ Ice, Neuromuscular Re-ed, Patient Education, Therapeutic Activities, and Therapeutic Exercise.     Nita Carney, PTA   06/08/2023

## 2023-06-13 ENCOUNTER — CLINICAL SUPPORT (OUTPATIENT)
Dept: REHABILITATION | Facility: HOSPITAL | Age: 53
End: 2023-06-13
Payer: COMMERCIAL

## 2023-06-13 DIAGNOSIS — M25.662 DECREASED RANGE OF MOTION (ROM) OF BOTH KNEES: ICD-10-CM

## 2023-06-13 DIAGNOSIS — M25.561 PAIN IN BOTH KNEES, UNSPECIFIED CHRONICITY: Primary | ICD-10-CM

## 2023-06-13 DIAGNOSIS — M62.81 QUADRICEPS WEAKNESS: ICD-10-CM

## 2023-06-13 DIAGNOSIS — Z96.653 HISTORY OF TOTAL KNEE ARTHROPLASTY, BILATERAL: ICD-10-CM

## 2023-06-13 DIAGNOSIS — M25.661 DECREASED RANGE OF MOTION (ROM) OF BOTH KNEES: ICD-10-CM

## 2023-06-13 DIAGNOSIS — M25.562 PAIN IN BOTH KNEES, UNSPECIFIED CHRONICITY: Primary | ICD-10-CM

## 2023-06-13 DIAGNOSIS — R26.9 GAIT DISTURBANCE: ICD-10-CM

## 2023-06-13 PROCEDURE — 97110 THERAPEUTIC EXERCISES: CPT | Mod: CQ

## 2023-06-13 PROCEDURE — 97112 NEUROMUSCULAR REEDUCATION: CPT | Mod: CQ

## 2023-06-13 NOTE — PROGRESS NOTES
[facility]   Physical Therapy Treatment Note     Name: Marian Foster  Clinic Number: [id number]    Therapy Diagnosis:   Encounter Diagnoses   Name Primary?    Pain in both knees, unspecified chronicity Yes    Decreased range of motion (ROM) of both knees     Gait disturbance     Quadriceps weakness     History of total knee arthroplasty, bilateral      Physician: Bia Rosales FNP    Visit Date: 6/13/2023  Physician: Bia Rosales FNP     Physician Orders: PT Eval and Treat   Medical Diagnosis from Referral: see above  Evaluation Date: 5/22/2023  Authorization Period Expiration: 7/22/2023  Plan of Care Expiration: 7/7/2023     Visit # / Visits authorized: 4/ 13    PTA Visit #: 3/5     Time In: 9:18 am   Time Out: 10:00 am   Total Billable Time: 40 minutes    SUBJECTIVE     Pt reports: she is a little sore. She hurt a bit after last treatment but it was a good hurt to go with the normal pain. She got a cramp in her toes but it wasn't as bad as the normal ones in her legs. Overall she feels she is improving.  She was compliant with home exercise program.  Response to previous treatment: something is working, she was able to stay up longer  Functional change: none    Pain: 6/10  Location: bilateral knee      OBJECTIVE     Objective Measures updated at progress report unless specified.   Case conference with Ellen Kilgore PT, for initial PTA visit.     Treatment     Marian received the treatments listed below:      therapeutic exercises to develop strength, endurance, posture, and core stabilization for 17 minutes including:  NuStep 5 minutes  Slant board 3 x 30 second hold   Hamstring stretch 3 x 30 second hold   Cybex hamstring curls 3 x 10 5#  Squats 2 x 10   Seated knee extension 2 plates 2 x 10 with flexion stretch between sets  Knee flexion stretch in chair 2 x 30 second hold left     neuromuscular re-education activities to improve: Balance, Coordination, Proprioception,  and Posture for 23 minutes. The following activities were included:  Bridges   Cybex hip flexion 3 x 10   2 plates bilateral   Cybex hip abduction 3 x 10   2 plates bilateral   Step ups 3 x 10   Calf raises 3 x 10     therapeutic activities to improve functional performance for -  minutes, including:        Patient Education and Home Exercises     Home Exercises Provided and Patient Education Provided     Education provided:   - encouraged to continue home exercise program     Written Home Exercises Provided: Patient instructed to cont prior HEP. Exercises were reviewed and Marian was able to demonstrate them prior to the end of the session.  Marian demonstrated good  understanding of the education provided. See EMR under Patient Instructions for exercises provided during therapy sessions    ASSESSMENT     Marian did well with all activities. She reported increase in pain in left knee today but felt better after stretching into flexion. She was able to add seated knee extension with a decrease in pain instead of an increase. She completed exercises without complaint of difficulty. Will continue to progress patient as tolerated.     Marian Is progressing towards her goals.   Pt prognosis is Good.     Pt will continue to benefit from skilled outpatient physical therapy to address the deficits listed in the problem list box on initial evaluation, provide pt/family education and to maximize pt's level of independence in the home and community environment.     Pt's spiritual, cultural and educational needs considered and pt agreeable to plan of care and goals.     Anticipated barriers to physical therapy: chronic knee pain     Goals:   SHORT TERM GOALS  1.  Patient to be independent with home exercise program to facilitate carryover between therapy visits.  2.  Patient will have 0-105 degrees range of motion bilateral knees for improved mobility.  3.  Patient will perform straight leg raise without quad lag for increased  quad control.  4.  Patient will increase manual muscle test of bilateral lower extremities to 4+ to 5/5 for increased stability with gait and activiites of daily living.     LONG TERM GOALS  1.  Patient will go up/down stairs reciprocal pattern with handrails and good eccentric control on both lower extremities.  2.  Patient will ambulate independent with improved extension during stance phase and less antalgia and pain.  3.  Patient will be able to go walking at [place] for exercise and play with her granddaughter for improved quality of life.     PLAN     Plan of care Certification: 5/22/2023 to 7/7/2023.     Outpatient Physical Therapy 2 times weekly for 6 weeks to include the following interventions: Electrical Stimulation NMES, Gait Training, Manual Therapy, Moist Heat/ Ice, Neuromuscular Re-ed, Patient Education, Therapeutic Activities, and Therapeutic Exercise.     Vita Monzon, PTA   06/13/2023

## 2023-06-15 ENCOUNTER — CLINICAL SUPPORT (OUTPATIENT)
Dept: REHABILITATION | Facility: HOSPITAL | Age: 53
End: 2023-06-15
Payer: COMMERCIAL

## 2023-06-15 DIAGNOSIS — M25.561 CHRONIC PAIN OF BOTH KNEES: Primary | ICD-10-CM

## 2023-06-15 DIAGNOSIS — G89.29 CHRONIC PAIN OF BOTH KNEES: Primary | ICD-10-CM

## 2023-06-15 DIAGNOSIS — M25.662 DECREASED RANGE OF MOTION (ROM) OF BOTH KNEES: ICD-10-CM

## 2023-06-15 DIAGNOSIS — Z96.653 HISTORY OF TOTAL KNEE ARTHROPLASTY, BILATERAL: ICD-10-CM

## 2023-06-15 DIAGNOSIS — M62.81 QUADRICEPS WEAKNESS: ICD-10-CM

## 2023-06-15 DIAGNOSIS — M25.562 CHRONIC PAIN OF BOTH KNEES: Primary | ICD-10-CM

## 2023-06-15 DIAGNOSIS — R26.9 GAIT DISTURBANCE: ICD-10-CM

## 2023-06-15 DIAGNOSIS — M25.661 DECREASED RANGE OF MOTION (ROM) OF BOTH KNEES: ICD-10-CM

## 2023-06-15 PROCEDURE — 97112 NEUROMUSCULAR REEDUCATION: CPT

## 2023-06-15 PROCEDURE — 97110 THERAPEUTIC EXERCISES: CPT

## 2023-06-15 NOTE — PROGRESS NOTES
[facility]   Physical Therapy Treatment Note     Name: Marian Foster  Clinic Number: [id number]    Therapy Diagnosis:   Encounter Diagnoses   Name Primary?    Chronic pain of both knees Yes    Decreased range of motion (ROM) of both knees     Gait disturbance     Quadriceps weakness     History of total knee arthroplasty, bilateral      Physician: Bia Rosales FNP    Visit Date: 6/15/2023  Physician: Bia Rosales FNP     Physician Orders: PT Eval and Treat   Medical Diagnosis from Referral: see above  Evaluation Date: 5/22/2023  Authorization Period Expiration: 7/22/2023  Plan of Care Expiration: 7/7/2023     Visit # / Visits authorized: 5/13    PTA Visit #:      Time In: 2:36 pm   Time Out: 3:09 pm   Total Billable Time: 33 minutes    SUBJECTIVE     Pt reports: she is a little sore  She was compliant with home exercise program.  Response to previous treatment: something is working, she was able to stay up longer  Functional change: none    Pain: 3/10  Location: bilateral knee      OBJECTIVE     Objective Measures updated at progress report unless specified.      Treatment     Marian received the treatments listed below:      therapeutic exercises to develop strength, endurance, posture, and core stabilization for 17 minutes including:  NuStep 5 minutes  Slant board 3 x 30 second hold   Hamstring stretch 3 x 30 second hold   Cybex hamstring curls 3 x 10 5#  Squats 2 x 10   Seated knee extension 2 plates 2 x 10 with flexion stretch between sets  Knee flexion stretch in chair 2 x 30 second hold left     neuromuscular re-education activities to improve: Balance, Coordination, Proprioception, and Posture for 16 minutes. The following activities were included:  Bridges   Cybex hip flexion 3 x 10   2 plates bilateral   Cybex hip abduction 3 x 10   2 plates bilateral   Step ups 3 x 10   Calf raises 3 x 10     therapeutic activities to improve functional performance for -  minutes,  including:        Patient Education and Home Exercises     Home Exercises Provided and Patient Education Provided     Education provided:   - encouraged to continue home exercise program     Written Home Exercises Provided: Patient instructed to cont prior HEP. Exercises were reviewed and Marian was able to demonstrate them prior to the end of the session.  Marian demonstrated good  understanding of the education provided. See EMR under Patient Instructions for exercises provided during therapy sessions    ASSESSMENT     Marian did well with all activities. She had no complaints of increased pain today, just fatigue. Will continue to progress patient as tolerated.     Marian Is progressing towards her goals.   Pt prognosis is Good.     Pt will continue to benefit from skilled outpatient physical therapy to address the deficits listed in the problem list box on initial evaluation, provide pt/family education and to maximize pt's level of independence in the home and community environment.     Pt's spiritual, cultural and educational needs considered and pt agreeable to plan of care and goals.     Anticipated barriers to physical therapy: chronic knee pain     Goals:   SHORT TERM GOALS  1.  Patient to be independent with home exercise program to facilitate carryover between therapy visits.  2.  Patient will have 0-105 degrees range of motion bilateral knees for improved mobility.  3.  Patient will perform straight leg raise without quad lag for increased quad control.  4.  Patient will increase manual muscle test of bilateral lower extremities to 4+ to 5/5 for increased stability with gait and activiites of daily living.     LONG TERM GOALS  1.  Patient will go up/down stairs reciprocal pattern with handrails and good eccentric control on both lower extremities.  2.  Patient will ambulate independent with improved extension during stance phase and less antalgia and pain.  3.  Patient will be able to go walking at [place]  for exercise and play with her granddaughter for improved quality of life.     PLAN     Plan of care Certification: 5/22/2023 to 7/7/2023.     Outpatient Physical Therapy 2 times weekly for 6 weeks to include the following interventions: Electrical Stimulation NMES, Gait Training, Manual Therapy, Moist Heat/ Ice, Neuromuscular Re-ed, Patient Education, Therapeutic Activities, and Therapeutic Exercise.     LIOR GENTILE, PT   06/15/2023

## 2023-06-19 ENCOUNTER — CLINICAL SUPPORT (OUTPATIENT)
Dept: REHABILITATION | Facility: HOSPITAL | Age: 53
End: 2023-06-19
Payer: COMMERCIAL

## 2023-06-19 DIAGNOSIS — M62.81 QUADRICEPS WEAKNESS: ICD-10-CM

## 2023-06-19 DIAGNOSIS — R26.9 GAIT DISTURBANCE: ICD-10-CM

## 2023-06-19 DIAGNOSIS — M25.562 CHRONIC PAIN OF BOTH KNEES: Primary | ICD-10-CM

## 2023-06-19 DIAGNOSIS — M25.662 DECREASED RANGE OF MOTION (ROM) OF BOTH KNEES: ICD-10-CM

## 2023-06-19 DIAGNOSIS — Z96.653 HISTORY OF TOTAL KNEE ARTHROPLASTY, BILATERAL: ICD-10-CM

## 2023-06-19 DIAGNOSIS — M25.561 CHRONIC PAIN OF BOTH KNEES: Primary | ICD-10-CM

## 2023-06-19 DIAGNOSIS — M25.661 DECREASED RANGE OF MOTION (ROM) OF BOTH KNEES: ICD-10-CM

## 2023-06-19 DIAGNOSIS — G89.29 CHRONIC PAIN OF BOTH KNEES: Primary | ICD-10-CM

## 2023-06-19 PROCEDURE — 97112 NEUROMUSCULAR REEDUCATION: CPT

## 2023-06-19 PROCEDURE — 97110 THERAPEUTIC EXERCISES: CPT

## 2023-06-21 ENCOUNTER — CLINICAL SUPPORT (OUTPATIENT)
Dept: REHABILITATION | Facility: HOSPITAL | Age: 53
End: 2023-06-21
Payer: COMMERCIAL

## 2023-06-21 DIAGNOSIS — M62.81 QUADRICEPS WEAKNESS: ICD-10-CM

## 2023-06-21 DIAGNOSIS — M25.661 DECREASED RANGE OF MOTION (ROM) OF BOTH KNEES: ICD-10-CM

## 2023-06-21 DIAGNOSIS — G89.29 CHRONIC PAIN OF BOTH KNEES: Primary | ICD-10-CM

## 2023-06-21 DIAGNOSIS — M25.561 CHRONIC PAIN OF BOTH KNEES: Primary | ICD-10-CM

## 2023-06-21 DIAGNOSIS — M25.562 CHRONIC PAIN OF BOTH KNEES: Primary | ICD-10-CM

## 2023-06-21 DIAGNOSIS — R26.9 GAIT DISTURBANCE: ICD-10-CM

## 2023-06-21 DIAGNOSIS — M25.662 DECREASED RANGE OF MOTION (ROM) OF BOTH KNEES: ICD-10-CM

## 2023-06-21 DIAGNOSIS — Z96.653 HISTORY OF TOTAL KNEE ARTHROPLASTY, BILATERAL: ICD-10-CM

## 2023-06-21 PROCEDURE — 97110 THERAPEUTIC EXERCISES: CPT

## 2023-06-21 PROCEDURE — 97112 NEUROMUSCULAR REEDUCATION: CPT

## 2023-06-21 NOTE — PROGRESS NOTES
[facility]   Physical Therapy Treatment Note     Name: Marian Foster  Clinic Number: [id number]    Therapy Diagnosis:   Encounter Diagnoses   Name Primary?    Chronic pain of both knees Yes    Decreased range of motion (ROM) of both knees     Gait disturbance     Quadriceps weakness     History of total knee arthroplasty, bilateral      Physician: Bia Rosales FNP    Visit Date: 6/21/2023  Physician: Bia Rosales FNP     Physician Orders: PT Eval and Treat   Medical Diagnosis from Referral: see above  Evaluation Date: 5/22/2023  Authorization Period Expiration: 7/22/2023  Plan of Care Expiration: 7/7/2023     Visit # / Visits authorized: 7/13    PTA Visit #:      Time In: 7:40 am   Time Out: 8:20 am   Total Billable Time: 38 minutes    SUBJECTIVE     Pt reports: no new complaints this morning  She was compliant with home exercise program.  Response to previous treatment: something is working, she was able to stay up longer  Functional change: none    Pain: 3/10  Location: bilateral knee      OBJECTIVE     Objective Measures updated at progress report unless specified.      Treatment     Marian received the treatments listed below:      therapeutic exercises to develop strength, endurance, posture, and core stabilization for 21 minutes including:  NuStep 5 minutes  Slant board 3 x 30 second hold   Hamstring stretch 3 x 30 second hold   Cybex hamstring curls 3 x 10 5#  Squats 3 x 10   Seated knee extension 2 plates 2 x 10 with flexion stretch between sets  Knee flexion stretch in chair 3 x 30 second hold - left   Prone knee flexion 4 x 30 second hold - left    neuromuscular re-education activities to improve: Balance, Coordination, Proprioception, and Posture for 17 minutes. The following activities were included:  Bridges   Cybex hip flexion 3 x 10   2 plates bilateral   Cybex hip abduction 3 x 10   2 plates bilateral   Step ups 3 x 10   Calf raises 3 x 10     therapeutic  activities to improve functional performance for -  minutes, including:        Patient Education and Home Exercises     Home Exercises Provided and Patient Education Provided     Education provided:   - encouraged to continue home exercise program     Written Home Exercises Provided: Patient instructed to cont prior HEP. Exercises were reviewed and Marian was able to demonstrate them prior to the end of the session.  Marian demonstrated good  understanding of the education provided. See EMR under Patient Instructions for exercises provided during therapy sessions    ASSESSMENT     Marian states she was able to take her garbage can to the road for the first time in 2 years this morning. She says she is feeling stronger but the left knee is still not bending any better. Left knee flexion range of motion has actually improved from 87 degrees at evaluation to 93 degrees today. Will continue to progress patient as tolerated.     Marian Is progressing towards her goals.   Pt prognosis is Good.     Pt will continue to benefit from skilled outpatient physical therapy to address the deficits listed in the problem list box on initial evaluation, provide pt/family education and to maximize pt's level of independence in the home and community environment.     Pt's spiritual, cultural and educational needs considered and pt agreeable to plan of care and goals.     Anticipated barriers to physical therapy: chronic knee pain     Goals:   SHORT TERM GOALS  1.  Patient to be independent with home exercise program to facilitate carryover between therapy visits.  2.  Patient will have 0-105 degrees range of motion bilateral knees for improved mobility.  3.  Patient will perform straight leg raise without quad lag for increased quad control.  4.  Patient will increase manual muscle test of bilateral lower extremities to 4+ to 5/5 for increased stability with gait and activiites of daily living.     LONG TERM GOALS  1.  Patient will go  up/down stairs reciprocal pattern with handrails and good eccentric control on both lower extremities.  2.  Patient will ambulate independent with improved extension during stance phase and less antalgia and pain.  3.  Patient will be able to go walking at [place] for exercise and play with her granddaughter for improved quality of life.     PLAN     Plan of care Certification: 5/22/2023 to 7/7/2023.     Outpatient Physical Therapy 2 times weekly for 6 weeks to include the following interventions: Electrical Stimulation NMES, Gait Training, Manual Therapy, Moist Heat/ Ice, Neuromuscular Re-ed, Patient Education, Therapeutic Activities, and Therapeutic Exercise.     LIOR GENTILE, PT   06/21/2023

## 2023-06-21 NOTE — PROGRESS NOTES
[facility]   Physical Therapy Treatment Note     Name: Marian Foster  Clinic Number: [id number]    Therapy Diagnosis:   Encounter Diagnoses   Name Primary?    Chronic pain of both knees Yes    Decreased range of motion (ROM) of both knees     Gait disturbance     Quadriceps weakness     History of total knee arthroplasty, bilateral      Physician: Bia Rosales FNP    Visit Date: 6/19/2023  Physician: Bia Rosales FNP     Physician Orders: PT Eval and Treat   Medical Diagnosis from Referral: see above  Evaluation Date: 5/22/2023  Authorization Period Expiration: 7/22/2023  Plan of Care Expiration: 7/7/2023     Visit # / Visits authorized: 6/13    PTA Visit #:      Time In: 7:50 am   Time Out: 8:27 am   Total Billable Time: 33 minutes    SUBJECTIVE     Pt reports: no new complaints this morning  She was compliant with home exercise program.  Response to previous treatment: something is working, she was able to stay up longer  Functional change: none    Pain: 3/10  Location: bilateral knee      OBJECTIVE     Objective Measures updated at progress report unless specified.      Treatment     Marian received the treatments listed below:      therapeutic exercises to develop strength, endurance, posture, and core stabilization for 17 minutes including:  NuStep 5 minutes  Slant board 3 x 30 second hold   Hamstring stretch 3 x 30 second hold   Cybex hamstring curls 3 x 10 5#  Squats 2 x 10   Seated knee extension 2 plates 2 x 10 with flexion stretch between sets  Knee flexion stretch in chair 2 x 30 second hold left     neuromuscular re-education activities to improve: Balance, Coordination, Proprioception, and Posture for 16 minutes. The following activities were included:  Bridges   Cybex hip flexion 3 x 10   2 plates bilateral   Cybex hip abduction 3 x 10   2 plates bilateral   Step ups 3 x 10   Calf raises 3 x 10     therapeutic activities to improve functional performance for -   minutes, including:        Patient Education and Home Exercises     Home Exercises Provided and Patient Education Provided     Education provided:   - encouraged to continue home exercise program     Written Home Exercises Provided: Patient instructed to cont prior HEP. Exercises were reviewed and Marian was able to demonstrate them prior to the end of the session.  Marian demonstrated good  understanding of the education provided. See EMR under Patient Instructions for exercises provided during therapy sessions    ASSESSMENT     Marian did well with all activities. She had no complaints of increased pain today, just fatigue. Will continue to progress patient as tolerated.     Marian Is progressing towards her goals.   Pt prognosis is Good.     Pt will continue to benefit from skilled outpatient physical therapy to address the deficits listed in the problem list box on initial evaluation, provide pt/family education and to maximize pt's level of independence in the home and community environment.     Pt's spiritual, cultural and educational needs considered and pt agreeable to plan of care and goals.     Anticipated barriers to physical therapy: chronic knee pain     Goals:   SHORT TERM GOALS  1.  Patient to be independent with home exercise program to facilitate carryover between therapy visits.  2.  Patient will have 0-105 degrees range of motion bilateral knees for improved mobility.  3.  Patient will perform straight leg raise without quad lag for increased quad control.  4.  Patient will increase manual muscle test of bilateral lower extremities to 4+ to 5/5 for increased stability with gait and activiites of daily living.     LONG TERM GOALS  1.  Patient will go up/down stairs reciprocal pattern with handrails and good eccentric control on both lower extremities.  2.  Patient will ambulate independent with improved extension during stance phase and less antalgia and pain.  3.  Patient will be able to go walking  at [place] for exercise and play with her granddaughter for improved quality of life.     PLAN     Plan of care Certification: 5/22/2023 to 7/7/2023.     Outpatient Physical Therapy 2 times weekly for 6 weeks to include the following interventions: Electrical Stimulation NMES, Gait Training, Manual Therapy, Moist Heat/ Ice, Neuromuscular Re-ed, Patient Education, Therapeutic Activities, and Therapeutic Exercise.     LIOR GENTILE, PT   06/21/2023

## 2023-06-27 ENCOUNTER — CLINICAL SUPPORT (OUTPATIENT)
Dept: REHABILITATION | Facility: HOSPITAL | Age: 53
End: 2023-06-27
Payer: COMMERCIAL

## 2023-06-27 DIAGNOSIS — M25.561 CHRONIC PAIN OF BOTH KNEES: Primary | ICD-10-CM

## 2023-06-27 DIAGNOSIS — M25.661 DECREASED RANGE OF MOTION (ROM) OF BOTH KNEES: ICD-10-CM

## 2023-06-27 DIAGNOSIS — R26.9 GAIT DISTURBANCE: ICD-10-CM

## 2023-06-27 DIAGNOSIS — M62.81 QUADRICEPS WEAKNESS: ICD-10-CM

## 2023-06-27 DIAGNOSIS — Z96.653 HISTORY OF TOTAL KNEE ARTHROPLASTY, BILATERAL: ICD-10-CM

## 2023-06-27 DIAGNOSIS — M25.562 CHRONIC PAIN OF BOTH KNEES: Primary | ICD-10-CM

## 2023-06-27 DIAGNOSIS — M25.662 DECREASED RANGE OF MOTION (ROM) OF BOTH KNEES: ICD-10-CM

## 2023-06-27 DIAGNOSIS — G89.29 CHRONIC PAIN OF BOTH KNEES: Primary | ICD-10-CM

## 2023-06-27 PROCEDURE — 97112 NEUROMUSCULAR REEDUCATION: CPT

## 2023-06-27 PROCEDURE — 97110 THERAPEUTIC EXERCISES: CPT

## 2023-06-27 NOTE — PROGRESS NOTES
[facility]   Physical Therapy Treatment Note     Name: Marian Foster  Clinic Number: [id number]    Therapy Diagnosis:   Encounter Diagnoses   Name Primary?    Chronic pain of both knees Yes    Decreased range of motion (ROM) of both knees     Gait disturbance     Quadriceps weakness     History of total knee arthroplasty, bilateral      Physician: Bia Rosales FNP    Visit Date: 6/27/2023  Physician: Bia Rosales FNP     Physician Orders: PT Eval and Treat   Medical Diagnosis from Referral: see above  Evaluation Date: 5/22/2023  Authorization Period Expiration: 7/22/2023  Plan of Care Expiration: 7/7/2023     Visit # / Visits authorized: 8/13    PTA Visit #:      Time In: 7:40 am   Time Out: 8:22 am   Total Billable Time: 42 minutes    SUBJECTIVE     Pt reports: says she is stiff and sore this morning - was in a car for 11 hours yesterday  She was compliant with home exercise program.  Response to previous treatment: something is working, she was able to stay up longer  Functional change: none    Pain: 3/10  Location: bilateral knee      OBJECTIVE     Objective Measures updated at progress report unless specified.      Treatment     Marian received the treatments listed below:      therapeutic exercises to develop strength, endurance, posture, and core stabilization for 24 minutes including:  NuStep 5 minutes  Slant board 3 x 30 second hold   Hamstring stretch 3 x 30 second hold   Cybex hamstring curls 3 x 10 5#  Squats 3 x 10   Seated knee extension 2 plates 2 x 10 with flexion stretch between sets  Knee flexion stretch in chair 3 x 30 second hold - left   Prone knee flexion 3 x 30 second hold - bilateral     neuromuscular re-education activities to improve: Balance, Coordination, Proprioception, and Posture for 18 minutes. The following activities were included:  Bridges   Cybex hip flexion 3 x 10   2 plates bilateral   Cybex hip abduction 3 x 10   2 plates bilateral    Step ups 3 x 10   Calf raises 3 x 10     therapeutic activities to improve functional performance for -  minutes, including:        Patient Education and Home Exercises     Home Exercises Provided and Patient Education Provided     Education provided:   - encouraged to continue home exercise program     Written Home Exercises Provided: Patient instructed to cont prior HEP. Exercises were reviewed and Marian was able to demonstrate them prior to the end of the session.  Marian demonstrated good  understanding of the education provided. See EMR under Patient Instructions for exercises provided during therapy sessions    ASSESSMENT     Marian states she was in a car for 11 hours yesterday and is sore and stiff this morning. She says she has lost 6# since starting therapy. She is able to complete exercises with fewer rest breaks now. Will continue to progress patient as tolerated.     Marian Is progressing towards her goals.   Pt prognosis is Good.     Pt will continue to benefit from skilled outpatient physical therapy to address the deficits listed in the problem list box on initial evaluation, provide pt/family education and to maximize pt's level of independence in the home and community environment.     Pt's spiritual, cultural and educational needs considered and pt agreeable to plan of care and goals.     Anticipated barriers to physical therapy: chronic knee pain     Goals:   SHORT TERM GOALS  1.  Patient to be independent with home exercise program to facilitate carryover between therapy visits.  2.  Patient will have 0-105 degrees range of motion bilateral knees for improved mobility.  3.  Patient will perform straight leg raise without quad lag for increased quad control.  4.  Patient will increase manual muscle test of bilateral lower extremities to 4+ to 5/5 for increased stability with gait and activiites of daily living.     LONG TERM GOALS  1.  Patient will go up/down stairs reciprocal pattern with  handrails and good eccentric control on both lower extremities.  2.  Patient will ambulate independent with improved extension during stance phase and less antalgia and pain.  3.  Patient will be able to go walking at [place] for exercise and play with her granddaughter for improved quality of life.     PLAN     Plan of care Certification: 5/22/2023 to 7/7/2023.     Outpatient Physical Therapy 2 times weekly for 6 weeks to include the following interventions: Electrical Stimulation NMES, Gait Training, Manual Therapy, Moist Heat/ Ice, Neuromuscular Re-ed, Patient Education, Therapeutic Activities, and Therapeutic Exercise.     LIOR GENTILE, PT   06/27/2023

## 2023-06-29 DIAGNOSIS — D48.5 NEOPLASM OF UNCERTAIN BEHAVIOR OF SKIN: Primary | ICD-10-CM

## 2023-07-26 RX ORDER — ADALIMUMAB 40MG/0.8ML
KIT SUBCUTANEOUS
COMMUNITY
End: 2023-08-07

## 2023-07-26 RX ORDER — SULFAMETHOXAZOLE AND TRIMETHOPRIM 800; 160 MG/1; MG/1
1 TABLET ORAL
COMMUNITY
Start: 2023-04-03 | End: 2023-08-07

## 2023-07-26 RX ORDER — CYCLOBENZAPRINE HCL 10 MG
10 TABLET ORAL DAILY
COMMUNITY
Start: 2023-06-05 | End: 2023-08-07

## 2023-07-26 RX ORDER — ESTRADIOL 0.1 MG/G
CREAM VAGINAL
COMMUNITY
End: 2023-07-26

## 2023-07-26 RX ORDER — MUPIROCIN 20 MG/G
OINTMENT TOPICAL
COMMUNITY
Start: 2023-06-29

## 2023-07-26 RX ORDER — PREDNISONE 10 MG/1
10 TABLET ORAL DAILY
COMMUNITY
Start: 2023-06-06

## 2023-07-26 RX ORDER — BACLOFEN 10 MG/1
10 TABLET ORAL DAILY
COMMUNITY
Start: 2023-06-05

## 2023-07-26 RX ORDER — CHLORPHENIRAMINE/PHENYLEPHRINE 4; 10 MG/5ML; MG/5ML
LIQUID ORAL
COMMUNITY
Start: 2022-06-27 | End: 2023-08-07

## 2023-07-26 RX ORDER — LEVOFLOXACIN 500 MG/1
500 TABLET, FILM COATED ORAL DAILY
COMMUNITY
Start: 2023-03-28 | End: 2023-08-07

## 2023-07-26 RX ORDER — METHOTREXATE 2.5 MG/1
2.5 TABLET ORAL
COMMUNITY
End: 2023-08-07

## 2023-07-26 RX ORDER — PROMETHAZINE HYDROCHLORIDE 25 MG/1
25 TABLET ORAL DAILY
COMMUNITY

## 2023-07-26 RX ORDER — MAGNESIUM 200 MG
TABLET ORAL
COMMUNITY
Start: 2022-10-17

## 2023-07-26 RX ORDER — PREGABALIN 150 MG/1
150 CAPSULE ORAL DAILY
COMMUNITY

## 2023-07-26 RX ORDER — FLUCONAZOLE 100 MG/1
100 TABLET ORAL DAILY
COMMUNITY
Start: 2023-04-03

## 2023-07-26 RX ORDER — TRAZODONE HYDROCHLORIDE 100 MG/1
100 TABLET ORAL DAILY
COMMUNITY
End: 2023-08-07

## 2023-07-26 RX ORDER — CEPHALEXIN 500 MG/1
500 CAPSULE ORAL 2 TIMES DAILY
COMMUNITY
Start: 2023-06-29 | End: 2023-08-07

## 2023-07-26 RX ORDER — ESTRADIOL 0.04 MG/D
PATCH TRANSDERMAL
COMMUNITY
End: 2023-10-09

## 2023-07-26 RX ORDER — ASPIRIN 325 MG
TABLET, DELAYED RELEASE (ENTERIC COATED) ORAL
COMMUNITY
Start: 2021-12-05

## 2023-07-26 RX ORDER — LABETALOL 200 MG/1
200 TABLET, FILM COATED ORAL DAILY
COMMUNITY
End: 2023-08-07

## 2023-07-26 RX ORDER — ALBUTEROL SULFATE 90 UG/1
AEROSOL, METERED RESPIRATORY (INHALATION)
COMMUNITY
Start: 2023-06-05

## 2023-07-26 RX ORDER — NABUMETONE 750 MG/1
750 TABLET, FILM COATED ORAL DAILY
COMMUNITY
Start: 2023-05-18 | End: 2023-08-07

## 2023-07-26 RX ORDER — NAPROXEN 500 MG/1
500 TABLET ORAL DAILY
COMMUNITY
End: 2023-08-07

## 2023-07-26 RX ORDER — LISINOPRIL 20 MG/1
20 TABLET ORAL DAILY
COMMUNITY
End: 2023-08-07

## 2023-07-26 RX ORDER — FUROSEMIDE 20 MG/1
20 TABLET ORAL DAILY
COMMUNITY

## 2023-07-27 ENCOUNTER — OFFICE VISIT (OUTPATIENT)
Dept: NEUROLOGY | Facility: CLINIC | Age: 53
End: 2023-07-27
Payer: COMMERCIAL

## 2023-07-27 VITALS
OXYGEN SATURATION: 100 % | BODY MASS INDEX: 47.09 KG/M2 | HEIGHT: 66 IN | RESPIRATION RATE: 18 BRPM | HEART RATE: 101 BPM | DIASTOLIC BLOOD PRESSURE: 65 MMHG | SYSTOLIC BLOOD PRESSURE: 96 MMHG | WEIGHT: 293 LBS

## 2023-07-27 DIAGNOSIS — G40.802 OTHER EPILEPSY WITHOUT STATUS EPILEPTICUS, NOT INTRACTABLE: Primary | Chronic | ICD-10-CM

## 2023-07-27 PROCEDURE — 99204 PR OFFICE/OUTPT VISIT, NEW, LEVL IV, 45-59 MIN: ICD-10-PCS | Mod: S$PBB,,, | Performed by: PSYCHIATRY & NEUROLOGY

## 2023-07-27 PROCEDURE — 1160F PR REVIEW ALL MEDS BY PRESCRIBER/CLIN PHARMACIST DOCUMENTED: ICD-10-PCS | Mod: CPTII,,, | Performed by: PSYCHIATRY & NEUROLOGY

## 2023-07-27 PROCEDURE — 1160F RVW MEDS BY RX/DR IN RCRD: CPT | Mod: CPTII,,, | Performed by: PSYCHIATRY & NEUROLOGY

## 2023-07-27 PROCEDURE — 3008F PR BODY MASS INDEX (BMI) DOCUMENTED: ICD-10-PCS | Mod: CPTII,,, | Performed by: PSYCHIATRY & NEUROLOGY

## 2023-07-27 PROCEDURE — 1159F MED LIST DOCD IN RCRD: CPT | Mod: CPTII,,, | Performed by: PSYCHIATRY & NEUROLOGY

## 2023-07-27 PROCEDURE — 3074F PR MOST RECENT SYSTOLIC BLOOD PRESSURE < 130 MM HG: ICD-10-PCS | Mod: CPTII,,, | Performed by: PSYCHIATRY & NEUROLOGY

## 2023-07-27 PROCEDURE — 3078F DIAST BP <80 MM HG: CPT | Mod: CPTII,,, | Performed by: PSYCHIATRY & NEUROLOGY

## 2023-07-27 PROCEDURE — 1159F PR MEDICATION LIST DOCUMENTED IN MEDICAL RECORD: ICD-10-PCS | Mod: CPTII,,, | Performed by: PSYCHIATRY & NEUROLOGY

## 2023-07-27 PROCEDURE — 4010F PR ACE/ARB THEARPY RXD/TAKEN: ICD-10-PCS | Mod: CPTII,,, | Performed by: PSYCHIATRY & NEUROLOGY

## 2023-07-27 PROCEDURE — 3078F PR MOST RECENT DIASTOLIC BLOOD PRESSURE < 80 MM HG: ICD-10-PCS | Mod: CPTII,,, | Performed by: PSYCHIATRY & NEUROLOGY

## 2023-07-27 PROCEDURE — 3074F SYST BP LT 130 MM HG: CPT | Mod: CPTII,,, | Performed by: PSYCHIATRY & NEUROLOGY

## 2023-07-27 PROCEDURE — 99215 OFFICE O/P EST HI 40 MIN: CPT | Mod: PBBFAC | Performed by: PSYCHIATRY & NEUROLOGY

## 2023-07-27 PROCEDURE — 3008F BODY MASS INDEX DOCD: CPT | Mod: CPTII,,, | Performed by: PSYCHIATRY & NEUROLOGY

## 2023-07-27 PROCEDURE — 99204 OFFICE O/P NEW MOD 45 MIN: CPT | Mod: S$PBB,,, | Performed by: PSYCHIATRY & NEUROLOGY

## 2023-07-27 PROCEDURE — 4010F ACE/ARB THERAPY RXD/TAKEN: CPT | Mod: CPTII,,, | Performed by: PSYCHIATRY & NEUROLOGY

## 2023-07-27 NOTE — PATIENT INSTRUCTIONS
Cont Tegretol as previous  Consider meds reconciliation   Recommned outpatient Psych but pt will consider   Phys activity as tolerated   EEG extended   F/u 3 months  
show
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
## 2023-08-01 ENCOUNTER — PROCEDURE VISIT (OUTPATIENT)
Dept: NEUROLOGY | Facility: HOSPITAL | Age: 53
End: 2023-08-01
Attending: PSYCHIATRY & NEUROLOGY
Payer: COMMERCIAL

## 2023-08-01 DIAGNOSIS — G40.802 OTHER EPILEPSY WITHOUT STATUS EPILEPTICUS, NOT INTRACTABLE: Chronic | ICD-10-CM

## 2023-08-01 PROCEDURE — 95813 EEG EXTND MNTR 61-119 MIN: CPT

## 2023-08-01 PROCEDURE — 95813 PR EEG, EXTENDED, 61-119 MINS: ICD-10-PCS | Mod: 26,,, | Performed by: PSYCHIATRY & NEUROLOGY

## 2023-08-01 PROCEDURE — 95813 EEG EXTND MNTR 61-119 MIN: CPT | Mod: 26,,, | Performed by: PSYCHIATRY & NEUROLOGY

## 2023-08-02 NOTE — PROCEDURES
[facility] [address] [place] [address]  Neurophysiology Department  Tel. [phone]    Patient: Marian Foster  MRN: [id number]   YOB: 1970   Date of Service:  08/21/2023        Refererring Physician:  Dr. Kennedy Khalil     EEG NUMBER:  [id number]    This is a standard 72 minute digital EEG performed as an outpatient    INDICATION:  52-year-old female with history of epilepsy without status epilepticus.      REPORT:  Background activity reaches 9-10 hertz range, blocks with eye opening, symmetrical.  Intermittent period of drowsiness, no abnormality seen there is artifacts noted during the recording secondary to restlessness.  Hyperventilation was performed and did not provoke any focal slowing or seizure activity.  Photic stimulation showed symmetrical driving.  No clinical event occurred during the recording.  Heart rate 72 per minute.  No electrographic seizure activity noted.      IMPRESSION:  This is a normal awake and drowsy state EEG.  If clinically indicated suggest EEG video monitoring.                 Marian Foster is a 52 y.o. female patient.            Procedures    8/2/2023

## 2023-08-07 RX ORDER — CARBAMAZEPINE 100 MG/1
500 TABLET, EXTENDED RELEASE ORAL 3 TIMES DAILY
Qty: 540 TABLET | Refills: 3 | Status: SHIPPED | OUTPATIENT
Start: 2023-08-07

## 2023-08-14 ENCOUNTER — OFFICE VISIT (OUTPATIENT)
Dept: DERMATOLOGY | Facility: CLINIC | Age: 53
End: 2023-08-14
Payer: COMMERCIAL

## 2023-08-14 VITALS — HEIGHT: 66 IN | WEIGHT: 293 LBS | BODY MASS INDEX: 47.09 KG/M2

## 2023-08-14 DIAGNOSIS — L08.9 SKIN INFECTION: ICD-10-CM

## 2023-08-14 DIAGNOSIS — L30.9 DERMATITIS, UNSPECIFIED: Primary | ICD-10-CM

## 2023-08-14 PROCEDURE — 87077 CULTURE, WOUND: ICD-10-PCS | Mod: ,,, | Performed by: CLINICAL MEDICAL LABORATORY

## 2023-08-14 PROCEDURE — 87186 SC STD MICRODIL/AGAR DIL: CPT | Mod: XU,,, | Performed by: CLINICAL MEDICAL LABORATORY

## 2023-08-14 PROCEDURE — 3008F BODY MASS INDEX DOCD: CPT | Mod: CPTII,,, | Performed by: DERMATOLOGY

## 2023-08-14 PROCEDURE — 1160F PR REVIEW ALL MEDS BY PRESCRIBER/CLIN PHARMACIST DOCUMENTED: ICD-10-PCS | Mod: CPTII,,, | Performed by: DERMATOLOGY

## 2023-08-14 PROCEDURE — 1160F RVW MEDS BY RX/DR IN RCRD: CPT | Mod: CPTII,,, | Performed by: DERMATOLOGY

## 2023-08-14 PROCEDURE — 87077 CULTURE AEROBIC IDENTIFY: CPT | Mod: ,,, | Performed by: CLINICAL MEDICAL LABORATORY

## 2023-08-14 PROCEDURE — 87070 CULTURE OTHR SPECIMN AEROBIC: CPT | Mod: ,,, | Performed by: CLINICAL MEDICAL LABORATORY

## 2023-08-14 PROCEDURE — 99214 OFFICE O/P EST MOD 30 MIN: CPT | Mod: ,,, | Performed by: DERMATOLOGY

## 2023-08-14 PROCEDURE — 87070 CULTURE, WOUND: ICD-10-PCS | Mod: ,,, | Performed by: CLINICAL MEDICAL LABORATORY

## 2023-08-14 PROCEDURE — 87186 CULTURE, WOUND: ICD-10-PCS | Mod: XU,,, | Performed by: CLINICAL MEDICAL LABORATORY

## 2023-08-14 PROCEDURE — 99214 PR OFFICE/OUTPT VISIT, EST, LEVL IV, 30-39 MIN: ICD-10-PCS | Mod: ,,, | Performed by: DERMATOLOGY

## 2023-08-14 PROCEDURE — 1159F MED LIST DOCD IN RCRD: CPT | Mod: CPTII,,, | Performed by: DERMATOLOGY

## 2023-08-14 PROCEDURE — 3008F PR BODY MASS INDEX (BMI) DOCUMENTED: ICD-10-PCS | Mod: CPTII,,, | Performed by: DERMATOLOGY

## 2023-08-14 PROCEDURE — 1159F PR MEDICATION LIST DOCUMENTED IN MEDICAL RECORD: ICD-10-PCS | Mod: CPTII,,, | Performed by: DERMATOLOGY

## 2023-08-14 RX ORDER — MUPIROCIN 20 MG/G
OINTMENT TOPICAL
Qty: 30 G | Refills: 6 | Status: SHIPPED | OUTPATIENT
Start: 2023-08-14 | End: 2024-02-21

## 2023-08-14 RX ORDER — TRIAMCINOLONE ACETONIDE 0.25 MG/G
CREAM TOPICAL
Qty: 80 G | Refills: 6 | Status: SHIPPED | OUTPATIENT
Start: 2023-08-14

## 2023-08-14 NOTE — PROGRESS NOTES
[facility]   Sveta Dillon MD    Patient Name: Marian Foster  Patient YOB: 1970   Date of Service: 8/14/23    CC: Lesions    HPI: Marian Foster is a 52 y.o. female here today for lesions, located on the abdomen, bilateral arms, bilateral legs.  Lesions has been present for many years.  Previous treatments include none.      Past Medical History:   Diagnosis Date    Arthritis     COPD (chronic obstructive pulmonary disease)     Disorder of kidney and ureter     Hypertension     Liver disease     Osteoarthritis     Seizures     Sleep apnea      Past Surgical History:   Procedure Laterality Date    ABDOMINAL SURGERY      x3    APPENDECTOMY      BREAST SURGERY      CHOLECYSTECTOMY      GANGLION CYST EXCISION Left     HYSTERECTOMY      KNEE ARTHROSCOPY      LAPAROSCOPY      lithrotripsy      MYRINGOTOMY W/ TUBES Left     TONSILLECTOMY      TRIGGER FINGER RELEASE Right      Review of patient's allergies indicates:   Allergen Reactions    Fluoxetine Other (See Comments)     Other reaction(s): Unknown  Suicidal      Meperidine Other (See Comments)     Other reaction(s): Unknown  Seizures      Tetracyclines Other (See Comments)     Other reaction(s): Unknown  Childhood unknown allergy      Adhesive tape-silicones Rash    Erythromycin      Other reaction(s): Unknown    Celecoxib      Other reaction(s): Unknown    Cyclobenzaprine      depression       Current Outpatient Medications:     apixaban (ELIQUIS) 2.5 mg Tab, Take 1 tablet (2.5 mg total) by mouth 2 (two) times daily., Disp: 24 tablet, Rfl: 0    baclofen (LIORESAL) 10 MG tablet, Take 10 mg by mouth once daily., Disp: , Rfl:     carBAMazepine (TEGRETOL XR) 100 MG 12 hr tablet, Take 5 tablets (500 mg total) by mouth 3 (three) times daily., Disp: 540 tablet, Rfl: 3    cholecalciferol, vitamin D3, 1,250 mcg (50,000 unit) capsule, Vitamin D3 1.25 MG (14380 UT) Oral Capsule QTY: 12 capsule Days: 30 Refills: 3  Written: 12/05/21 Patient Instructions: 1  capsule weekly x 12 weeks, Disp: , Rfl:     clotrimazole-betamethasone 1-0.05% (LOTRISONE) cream, , Disp: , Rfl:     cyanocobalamin (VITAMIN B-12) 1000 MCG tablet, Take 2,000 mcg by mouth once daily., Disp: , Rfl:     ergocalciferol (ERGOCALCIFEROL) 50,000 unit Cap, Take 50,000 Units by mouth every 7 days., Disp: , Rfl:     EScitalopram oxalate (LEXAPRO) 20 MG tablet, Take 20 mg by mouth once daily., Disp: , Rfl:     estradioL (CLIMARA) 0.0375 mg/24 hr, 1 patch to skin Transdermal for 30 day(s), Disp: , Rfl:     estradiol valerate (DELESTROGEN) 20 mg/mL injection, , Disp: , Rfl:     etanercept (ENBREL) 25 mg (1 mL) SolR injection, as directed Subcutaneous, Disp: , Rfl:     febuxostat (ULORIC) 40 mg Tab, Take 40 mg by mouth once daily., Disp: , Rfl:     fluconazole (DIFLUCAN) 100 MG tablet, Take 100 mg by mouth once daily., Disp: , Rfl:     furosemide (LASIX) 20 MG tablet, Take 20 mg by mouth once daily., Disp: , Rfl:     ibuprofen (ADVIL,MOTRIN) 800 MG tablet, Take 800 mg by mouth every 6 (six) hours as needed for Pain., Disp: , Rfl:     magnesium 200 mg Tab, Magnesium 400 MG Oral Tablet QTY: 30 tablet Days: 30 Refills: 5  Written: 10/17/22 Patient Instructions: Take one po qd, Disp: , Rfl:     magnesium oxide (MAG-OX) 400 mg (241.3 mg magnesium) tablet, Take 400 mg by mouth 2 (two) times daily., Disp: , Rfl:     mupirocin (BACTROBAN) 2 % ointment, SMARTSI Application Topical 2-3 Times Daily, Disp: , Rfl:     mupirocin (BACTROBAN) 2 % ointment, Apply to AA on body BID PRN flares tapering with improvement, Disp: 30 g, Rfl: 6    ORENCIA CLICKJECT 125 mg/mL AtIn, once a week., Disp: , Rfl:     oxyCODONE-acetaminophen (PERCOCET)  mg per tablet, , Disp: , Rfl:     phentermine (ADIPEX-P) 37.5 mg tablet, , Disp: , Rfl:     potassium chloride SA (K-DUR,KLOR-CON) 20 MEQ tablet, , Disp: , Rfl:     predniSONE (DELTASONE) 10 MG tablet, Take 10 mg by mouth once daily., Disp: , Rfl:     pregabalin (LYRICA) 150 MG  capsule, Take 150 mg by mouth once daily., Disp: , Rfl:     promethazine (PHENERGAN) 25 MG tablet, Take 25 mg by mouth once daily., Disp: , Rfl:     triamcinolone acetonide 0.025% (KENALOG) 0.025 % cream, Apply to AA on body BID PRN flares tapering with improvement, Disp: 80 g, Rfl: 6    VENTOLIN HFA 90 mcg/actuation inhaler, , Disp: , Rfl:     ROS: A focused review of systems was obtained and negative.     Exam: A focused skin exam was performed. All areas examined were normal except as mentioned in the assessment and plan below.  General Appearance of the patient is well developed and well nourished.  Orientation: alert and oriented x 3.  Mood and affect: pleasant.    Assessment:   The primary encounter diagnosis was Dermatitis, unspecified. A diagnosis of Skin infection was also pertinent to this visit.    Plan:   Medications Ordered This Encounter   Medications    mupirocin (BACTROBAN) 2 % ointment     Sig: Apply to AA on body BID PRN flares tapering with improvement     Dispense:  30 g     Refill:  6    triamcinolone acetonide 0.025% (KENALOG) 0.025 % cream     Sig: Apply to AA on body BID PRN flares tapering with improvement     Dispense:  80 g     Refill:  6     Seborrheic Keratosis (L82.1)  - Stuck-on, warty, greasy brown papule with pseudo-horn cysts scattered on the trunk and extremities    Plan: Counseling.  I counseled the patient regarding the following:  Skin Care: Seborrheic Keratoses are benign. No treatment is necessary.  Expectations: Seborrheic Keratoses are benign warty growths. Patients get more of them as they age    Plan: Reassurance    Skin Infection, NOS   - see morphology below    Plan: Counseling  I counseled the patient regarding the following:  Skin care: Patients with purulence or fluid collections should have their wounds re-opened, drained, cultured and irrigated. All wound infections should be treated with antibiotics.  Expectations: Wound Infections usually occur 4-7 days  postoperatively. Patients exhibit, pain, rednes, swelling, cellulitic changes and fever.  Contact Office if: Wound Infection fails to respond to treatment or worsens, patient develops a fever, or if redness spreads despite antibiotics.    A bacterial culture was obtained from the neck and legs     - begin mupirocin    Dermatitis Unspecified  - scattered eroded papules some with a collarette of scale with some PN like lesions   DDx: staph folliculitis vs PN    Plan: Counseling  I counseled the patient regarding the following:  Skin care: Patient instructed to use gentle skin care including dove unscented soap, CeraVe moisturizing cream, and fragrance free laundry detergent.  Expectations: The patient understands that there is not a definitive diagnosis at this time. Further testing or empiric therapy may be necessary to diagnose and improve the condition.  Contact office if: The patient develops a fever, or rash dramatically worsens despite treatment.  - will obtain culture and start mupirocin daily  - begin triamcinolone daily  - encouraged pt to abstain from scratching and picking the lesion     Follow up if symptoms worsen or fail to improve.    Sveta Dillno MD    Medications Ordered This Encounter   Medications    mupirocin (BACTROBAN) 2 % ointment     Sig: Apply to AA on body BID PRN flares tapering with improvement     Dispense:  30 g     Refill:  6    triamcinolone acetonide 0.025% (KENALOG) 0.025 % cream     Sig: Apply to AA on body BID PRN flares tapering with improvement     Dispense:  80 g     Refill:  6

## 2023-08-14 NOTE — PROCEDURES
[facility] [address] [place] [address]  Neurophysiology Department  Tel. [phone]    Patient: Marian Foster  MRN: [id number]   YOB: 1970   Date of Service:  08/01/2023        Refererring Physician:  Dr. Kennedy Khalil     EEG NUMBER:  [id number]    This is a standard 72 minute digital EEG performed as an outpatient    INDICATION:  52-year-old female with history of epilepsy without status epilepticus.      REPORT:  Background activity reaches 9-10 hertz range, blocks with eye opening, symmetrical.  Intermittent period of drowsiness, no abnormality seen.  Intermittent artifacts noted during the recording secondary to restlessness.  Hyperventilation was performed and did not provoke any focal slowing or seizure activity.  Photic stimulation showed symmetrical driving.  No clinical event occurred during the recording.  Heart rate 72 per minute.  No electrographic seizure activity noted.      IMPRESSION:  This is a normal awake and drowsy state EEG.  If clinically indicated suggest EEG video monitoring.                             Procedures    8/14/2023

## 2023-08-18 DIAGNOSIS — Z96.653 HISTORY OF TOTAL KNEE ARTHROPLASTY, BILATERAL: Primary | ICD-10-CM

## 2023-08-19 LAB
MICROORGANISM SPEC CULT: ABNORMAL

## 2023-08-21 ENCOUNTER — OFFICE VISIT (OUTPATIENT)
Dept: ORTHOPEDICS | Facility: CLINIC | Age: 53
End: 2023-08-21
Payer: COMMERCIAL

## 2023-08-21 ENCOUNTER — HOSPITAL ENCOUNTER (OUTPATIENT)
Dept: RADIOLOGY | Facility: HOSPITAL | Age: 53
Discharge: HOME | End: 2023-08-21
Attending: ORTHOPAEDIC SURGERY
Payer: COMMERCIAL

## 2023-08-21 DIAGNOSIS — Z96.653 HISTORY OF TOTAL BILATERAL KNEE REPLACEMENT (TKR): Primary | ICD-10-CM

## 2023-08-21 DIAGNOSIS — Z96.653 HISTORY OF TOTAL KNEE ARTHROPLASTY, BILATERAL: ICD-10-CM

## 2023-08-21 DIAGNOSIS — L08.9 SKIN INFECTION: Primary | ICD-10-CM

## 2023-08-21 PROCEDURE — 1159F PR MEDICATION LIST DOCUMENTED IN MEDICAL RECORD: ICD-10-PCS | Mod: CPTII,,, | Performed by: ORTHOPAEDIC SURGERY

## 2023-08-21 PROCEDURE — 73562 X-RAY EXAM OF KNEE 3: CPT | Mod: TC,50

## 2023-08-21 PROCEDURE — 99214 OFFICE O/P EST MOD 30 MIN: CPT | Mod: S$PBB,,, | Performed by: ORTHOPAEDIC SURGERY

## 2023-08-21 PROCEDURE — 99213 OFFICE O/P EST LOW 20 MIN: CPT | Mod: PBBFAC | Performed by: ORTHOPAEDIC SURGERY

## 2023-08-21 PROCEDURE — 1159F MED LIST DOCD IN RCRD: CPT | Mod: CPTII,,, | Performed by: ORTHOPAEDIC SURGERY

## 2023-08-21 PROCEDURE — 73562 X-RAY EXAM OF KNEE 3: CPT | Mod: 26,50,, | Performed by: ORTHOPAEDIC SURGERY

## 2023-08-21 PROCEDURE — 73562 XR KNEE AP LAT WITH SUNRISE BILAT: ICD-10-PCS | Mod: 26,50,, | Performed by: ORTHOPAEDIC SURGERY

## 2023-08-21 PROCEDURE — 1160F PR REVIEW ALL MEDS BY PRESCRIBER/CLIN PHARMACIST DOCUMENTED: ICD-10-PCS | Mod: CPTII,,, | Performed by: ORTHOPAEDIC SURGERY

## 2023-08-21 PROCEDURE — 99214 PR OFFICE/OUTPT VISIT, EST, LEVL IV, 30-39 MIN: ICD-10-PCS | Mod: S$PBB,,, | Performed by: ORTHOPAEDIC SURGERY

## 2023-08-21 PROCEDURE — 1160F RVW MEDS BY RX/DR IN RCRD: CPT | Mod: CPTII,,, | Performed by: ORTHOPAEDIC SURGERY

## 2023-08-21 RX ORDER — AMOXICILLIN AND CLAVULANATE POTASSIUM 500; 125 MG/1; MG/1
1 TABLET, FILM COATED ORAL 2 TIMES DAILY
Qty: 28 TABLET | Refills: 0 | Status: SHIPPED | OUTPATIENT
Start: 2023-08-21 | End: 2023-09-04

## 2023-08-21 NOTE — PROGRESS NOTES
CLINIC NOTE       Chief Complaint   Patient presents with    Left Knee - Pain    Right Knee - Pain        Marian Foster is a 52 y.o. female seen today for recheck of both knees.  She underwent right TKR 05/19/2022 and left TKR 12/07/2021.  She is doing well aspect both knees.  She ambulates independently.  Her BMI is 50.53.  She had a fall in the bathroom dental office several months ago.  She felt a pop in her right knee, had some ecchymosis but had some improvement in range of motion.    Past Medical History:   Diagnosis Date    Arthritis     COPD (chronic obstructive pulmonary disease)     Disorder of kidney and ureter     Hypertension     Liver disease     Osteoarthritis     Seizures     Sleep apnea      History reviewed. No pertinent family history.  Current Outpatient Medications on File Prior to Visit   Medication Sig Dispense Refill    apixaban (ELIQUIS) 2.5 mg Tab Take 1 tablet (2.5 mg total) by mouth 2 (two) times daily. 24 tablet 0    baclofen (LIORESAL) 10 MG tablet Take 10 mg by mouth once daily.      carBAMazepine (TEGRETOL XR) 100 MG 12 hr tablet Take 5 tablets (500 mg total) by mouth 3 (three) times daily. 540 tablet 3    cholecalciferol, vitamin D3, 1,250 mcg (50,000 unit) capsule Vitamin D3 1.25 MG (84705 UT) Oral Capsule QTY: 12 capsule Days: 30 Refills: 3  Written: 12/05/21 Patient Instructions: 1 capsule weekly x 12 weeks      clotrimazole-betamethasone 1-0.05% (LOTRISONE) cream       cyanocobalamin (VITAMIN B-12) 1000 MCG tablet Take 2,000 mcg by mouth once daily.      ergocalciferol (ERGOCALCIFEROL) 50,000 unit Cap Take 50,000 Units by mouth every 7 days.      EScitalopram oxalate (LEXAPRO) 20 MG tablet Take 20 mg by mouth once daily.      estradioL (CLIMARA) 0.0375 mg/24 hr 1 patch to skin Transdermal for 30 day(s)      estradiol valerate (DELESTROGEN) 20 mg/mL injection       etanercept (ENBREL) 25 mg (1 mL) SolR injection as directed Subcutaneous      febuxostat (ULORIC) 40 mg Tab  Take 40 mg by mouth once daily.      fluconazole (DIFLUCAN) 100 MG tablet Take 100 mg by mouth once daily.      furosemide (LASIX) 20 MG tablet Take 20 mg by mouth once daily.      ibuprofen (ADVIL,MOTRIN) 800 MG tablet Take 800 mg by mouth every 6 (six) hours as needed for Pain.      magnesium 200 mg Tab Magnesium 400 MG Oral Tablet QTY: 30 tablet Days: 30 Refills: 5  Written: 10/17/22 Patient Instructions: Take one po qd      magnesium oxide (MAG-OX) 400 mg (241.3 mg magnesium) tablet Take 400 mg by mouth 2 (two) times daily.      mupirocin (BACTROBAN) 2 % ointment SMARTSI Application Topical 2-3 Times Daily      mupirocin (BACTROBAN) 2 % ointment Apply to AA on body BID PRN flares tapering with improvement 30 g 6    ORENCIA CLICKJECT 125 mg/mL AtIn once a week.      oxyCODONE-acetaminophen (PERCOCET)  mg per tablet       phentermine (ADIPEX-P) 37.5 mg tablet       potassium chloride SA (K-DUR,KLOR-CON) 20 MEQ tablet       predniSONE (DELTASONE) 10 MG tablet Take 10 mg by mouth once daily.      pregabalin (LYRICA) 150 MG capsule Take 150 mg by mouth once daily.      promethazine (PHENERGAN) 25 MG tablet Take 25 mg by mouth once daily.      triamcinolone acetonide 0.025% (KENALOG) 0.025 % cream Apply to AA on body BID PRN flares tapering with improvement 80 g 6    VENTOLIN HFA 90 mcg/actuation inhaler        No current facility-administered medications on file prior to visit.       ROS     There were no vitals filed for this visit.    Past Surgical History:   Procedure Laterality Date    ABDOMINAL SURGERY      x3    APPENDECTOMY      BREAST SURGERY      CHOLECYSTECTOMY      GANGLION CYST EXCISION Left     HYSTERECTOMY      KNEE ARTHROSCOPY      LAPAROSCOPY      lithrotripsy      MYRINGOTOMY W/ TUBES Left     TONSILLECTOMY      TRIGGER FINGER RELEASE Right         Review of patient's allergies indicates:   Allergen Reactions    Fluoxetine Other (See Comments)     Other reaction(s): Unknown  Suicidal       Meperidine Other (See Comments)     Other reaction(s): Unknown  Seizures      Tetracyclines Other (See Comments)     Other reaction(s): Unknown  Childhood unknown allergy      Adhesive tape-silicones Rash    Erythromycin      Other reaction(s): Unknown    Celecoxib      Other reaction(s): Unknown    Cyclobenzaprine      depression        Ortho Exam :  Well-developed well-nourished  female no acute distress.  She is alert oriented cooperative.  Neck is supple without JVD.  Breathing is regular nonlabored.  Skin is warm dry no lesions seen.  Exam of the right knee shows well-healed midline anterior incision.  No effusion.  Knee ligaments stable clinically.  Knee motion is 0-95 degrees of flexion.  Patella tracks well in the midline.  Exam of the left knee shows well-healed midline incision.  No effusion.  Knee range motion 0 to 95° of flexion.  Knee ligaments stable clinically.  Patella tracks well in the midline.    Radiographic Examination:  Right knee 08/21/2023    Technique:  Three views AP lateral and patellar  Findings:  Bones well mineralized.  Metallic Arthrex components are in expected position alignment for total knee replacement arthroplasty.  Patella seen midline sulcus on sunrise view.    Impression:   See Above  Radiographic examination: Left knee 08/21/2023  Technique: Three views AP lateral and patellar  Findings:  The bones are well mineralized.  Metallic arthroplasty components are in expected position alignment for total knee replacement arthroplasty.  The patella seen midline sulcus on sunrise view.  Assessment and Plan  Patient Active Problem List    Diagnosis Date Noted    Knee pain, bilateral 05/23/2023    Decreased range of motion (ROM) of both knees 05/23/2023    Gait disturbance 05/23/2023    Quadriceps weakness 05/23/2023    History of total knee arthroplasty, bilateral 06/09/2022    Uncontrolled pain     Hard to intubate 05/19/2022    Severe obesity (BMI >= 40) 12/07/2021    GAMAL  (obstructive sleep apnea) 12/07/2021    HTN (hypertension) 12/07/2021    Epilepsy 12/07/2021    Primary osteoarthritis of both knees 10/04/2021    Impression:  Status post bilateral TKR  Plan:  Continue self-directed efforts to stretch and improve flexion.  Recheck repeat x-rays 6 months or sooner for interval problems.    Gaurang Toussaint M.D.

## 2023-09-12 PROBLEM — M25.561 KNEE PAIN, BILATERAL: Status: RESOLVED | Noted: 2023-05-23 | Resolved: 2023-09-12

## 2023-09-12 PROBLEM — M25.661 DECREASED RANGE OF MOTION (ROM) OF BOTH KNEES: Status: RESOLVED | Noted: 2023-05-23 | Resolved: 2023-09-12

## 2023-09-12 PROBLEM — R26.9 GAIT DISTURBANCE: Status: RESOLVED | Noted: 2023-05-23 | Resolved: 2023-09-12

## 2023-09-12 PROBLEM — Z96.653 HISTORY OF TOTAL BILATERAL KNEE REPLACEMENT (TKR): Status: RESOLVED | Noted: 2022-06-09 | Resolved: 2023-09-12

## 2023-09-12 PROBLEM — M25.662 DECREASED RANGE OF MOTION (ROM) OF BOTH KNEES: Status: RESOLVED | Noted: 2023-05-23 | Resolved: 2023-09-12

## 2023-09-12 PROBLEM — M62.81 QUADRICEPS WEAKNESS: Status: RESOLVED | Noted: 2023-05-23 | Resolved: 2023-09-12

## 2023-09-12 PROBLEM — M25.562 KNEE PAIN, BILATERAL: Status: RESOLVED | Noted: 2023-05-23 | Resolved: 2023-09-12

## 2023-10-09 RX ORDER — ZAFIRLUKAST 10 MG/1
10 TABLET, FILM COATED ORAL DAILY
COMMUNITY
Start: 2023-08-18

## 2023-10-09 RX ORDER — NABUMETONE 500 MG/1
500 TABLET, FILM COATED ORAL DAILY
COMMUNITY
Start: 2023-08-02

## 2023-10-10 ENCOUNTER — OFFICE VISIT (OUTPATIENT)
Dept: NEUROLOGY | Facility: CLINIC | Age: 53
End: 2023-10-10
Payer: COMMERCIAL

## 2023-10-10 VITALS
HEART RATE: 103 BPM | WEIGHT: 293 LBS | BODY MASS INDEX: 47.09 KG/M2 | TEMPERATURE: 97 F | HEIGHT: 66 IN | OXYGEN SATURATION: 98 % | RESPIRATION RATE: 18 BRPM | SYSTOLIC BLOOD PRESSURE: 150 MMHG | DIASTOLIC BLOOD PRESSURE: 90 MMHG

## 2023-10-10 DIAGNOSIS — G40.909 NONINTRACTABLE EPILEPSY WITHOUT STATUS EPILEPTICUS, UNSPECIFIED EPILEPSY TYPE: Primary | Chronic | ICD-10-CM

## 2023-10-10 PROCEDURE — 3008F PR BODY MASS INDEX (BMI) DOCUMENTED: ICD-10-PCS | Mod: CPTII,,, | Performed by: PSYCHIATRY & NEUROLOGY

## 2023-10-10 PROCEDURE — 99214 OFFICE O/P EST MOD 30 MIN: CPT | Mod: S$PBB,,, | Performed by: PSYCHIATRY & NEUROLOGY

## 2023-10-10 PROCEDURE — 99214 PR OFFICE/OUTPT VISIT, EST, LEVL IV, 30-39 MIN: ICD-10-PCS | Mod: S$PBB,,, | Performed by: PSYCHIATRY & NEUROLOGY

## 2023-10-10 PROCEDURE — 3008F BODY MASS INDEX DOCD: CPT | Mod: CPTII,,, | Performed by: PSYCHIATRY & NEUROLOGY

## 2023-10-10 PROCEDURE — 99215 OFFICE O/P EST HI 40 MIN: CPT | Mod: PBBFAC | Performed by: PSYCHIATRY & NEUROLOGY

## 2023-10-10 PROCEDURE — 1159F MED LIST DOCD IN RCRD: CPT | Mod: CPTII,,, | Performed by: PSYCHIATRY & NEUROLOGY

## 2023-10-10 PROCEDURE — 3080F DIAST BP >= 90 MM HG: CPT | Mod: CPTII,,, | Performed by: PSYCHIATRY & NEUROLOGY

## 2023-10-10 PROCEDURE — 1160F PR REVIEW ALL MEDS BY PRESCRIBER/CLIN PHARMACIST DOCUMENTED: ICD-10-PCS | Mod: CPTII,,, | Performed by: PSYCHIATRY & NEUROLOGY

## 2023-10-10 PROCEDURE — 1160F RVW MEDS BY RX/DR IN RCRD: CPT | Mod: CPTII,,, | Performed by: PSYCHIATRY & NEUROLOGY

## 2023-10-10 PROCEDURE — 3080F PR MOST RECENT DIASTOLIC BLOOD PRESSURE >= 90 MM HG: ICD-10-PCS | Mod: CPTII,,, | Performed by: PSYCHIATRY & NEUROLOGY

## 2023-10-10 PROCEDURE — 3077F PR MOST RECENT SYSTOLIC BLOOD PRESSURE >= 140 MM HG: ICD-10-PCS | Mod: CPTII,,, | Performed by: PSYCHIATRY & NEUROLOGY

## 2023-10-10 PROCEDURE — 1159F PR MEDICATION LIST DOCUMENTED IN MEDICAL RECORD: ICD-10-PCS | Mod: CPTII,,, | Performed by: PSYCHIATRY & NEUROLOGY

## 2023-10-10 PROCEDURE — 3077F SYST BP >= 140 MM HG: CPT | Mod: CPTII,,, | Performed by: PSYCHIATRY & NEUROLOGY

## 2023-10-10 NOTE — PATIENT INSTRUCTIONS
Cont Tegretol as tolerated   Needs CMP per PCP to eval sodium levels while on above   Improve sleep habits   Healthier lifestyle habits   Avoid any triggers   Incr phsy activity - PT/Rehab pending  Recommend f/u with Behav Health   F/u 6 months

## 2023-10-10 NOTE — PROGRESS NOTES
Subjective:       Patient ID: Marian Foster is a 53 y.o. female     Chief Complaint:    Chief Complaint   Patient presents with    Follow-up     Epilepsy        Allergies:  Fluoxetine, Meperidine, Tetracyclines, Adhesive tape-silicones, Erythromycin, Celecoxib, Cyclobenzaprine, and Adhesive    Current Medications:    Outpatient Encounter Medications as of 10/10/2023   Medication Sig Dispense Refill    apixaban (ELIQUIS) 2.5 mg Tab Take 1 tablet (2.5 mg total) by mouth 2 (two) times daily. 24 tablet 0    baclofen (LIORESAL) 10 MG tablet Take 10 mg by mouth once daily.      carBAMazepine (TEGRETOL XR) 100 MG 12 hr tablet Take 5 tablets (500 mg total) by mouth 3 (three) times daily. 540 tablet 3    cholecalciferol, vitamin D3, 1,250 mcg (50,000 unit) capsule Vitamin D3 1.25 MG (08108 UT) Oral Capsule QTY: 12 capsule Days: 30 Refills: 3  Written: 21 Patient Instructions: 1 capsule weekly x 12 weeks      clotrimazole-betamethasone 1-0.05% (LOTRISONE) cream       cyanocobalamin (VITAMIN B-12) 1000 MCG tablet Take 2,000 mcg by mouth once daily.      ergocalciferol (ERGOCALCIFEROL) 50,000 unit Cap Take 50,000 Units by mouth every 7 days.      EScitalopram oxalate (LEXAPRO) 20 MG tablet Take 20 mg by mouth once daily.      estradiol valerate (DELESTROGEN) 20 mg/mL injection       febuxostat (ULORIC) 40 mg Tab Take 40 mg by mouth once daily.      fluconazole (DIFLUCAN) 100 MG tablet Take 100 mg by mouth once daily.      furosemide (LASIX) 20 MG tablet Take 20 mg by mouth once daily.      ibuprofen (ADVIL,MOTRIN) 800 MG tablet Take 800 mg by mouth every 6 (six) hours as needed for Pain.      magnesium 200 mg Tab Magnesium 400 MG Oral Tablet QTY: 30 tablet Days: 30 Refills: 5  Written: 10/17/22 Patient Instructions: Take one po qd      magnesium oxide (MAG-OX) 400 mg (241.3 mg magnesium) tablet Take 400 mg by mouth 2 (two) times daily.      mupirocin (BACTROBAN) 2 % ointment SMARTSI Application Topical 2-3 Times  Daily      mupirocin (BACTROBAN) 2 % ointment Apply to AA on body BID PRN flares tapering with improvement 30 g 6    nabumetone (RELAFEN) 500 MG tablet Take 500 mg by mouth once daily.      ORENCIA CLICKJECT 125 mg/mL AtIn once a week.      oxyCODONE-acetaminophen (PERCOCET)  mg per tablet       phentermine (ADIPEX-P) 37.5 mg tablet       potassium chloride SA (K-DUR,KLOR-CON) 20 MEQ tablet       predniSONE (DELTASONE) 10 MG tablet Take 10 mg by mouth once daily.      pregabalin (LYRICA) 150 MG capsule Take 150 mg by mouth once daily.      promethazine (PHENERGAN) 25 MG tablet Take 25 mg by mouth once daily.      triamcinolone acetonide 0.025% (KENALOG) 0.025 % cream Apply to AA on body BID PRN flares tapering with improvement 80 g 6    VENTOLIN HFA 90 mcg/actuation inhaler       zafirlukast (ACCOLATE) 10 MG Tab Take 10 mg by mouth once daily.      [DISCONTINUED] estradioL (CLIMARA) 0.0375 mg/24 hr 1 patch to skin Transdermal for 30 day(s)      [DISCONTINUED] etanercept (ENBREL) 25 mg (1 mL) SolR injection as directed Subcutaneous       No facility-administered encounter medications on file as of 10/10/2023.       History of Present Illness  52 yo WF w/ reported seizures but none in last several months ago ? Recent EEG comp NL   Cont on Tegretol w/o difficulty  Also w/ chronic almost every other day HEADACHE's nto alleviated w/ mult meds or OTC meds   Poor sleep habits may be exacerbating her HEADACHE's as well as stress/anxiety from childhood issues   Still In Pain mgmt for chronic pains and getting CANDELARIO's and opioid narcotics - she states only helping mildly at best          Past Medical History:   Diagnosis Date    Arthritis     COPD (chronic obstructive pulmonary disease)     Disorder of kidney and ureter     Hypertension     Liver disease     Osteoarthritis     Seizures     Sleep apnea        Past Surgical History:   Procedure Laterality Date    ABDOMINAL SURGERY      x3    APPENDECTOMY      BREAST SURGERY  "     CHOLECYSTECTOMY      GANGLION CYST EXCISION Left     HYSTERECTOMY      KNEE ARTHROSCOPY      LAPAROSCOPY      lithrotripsy      MYRINGOTOMY W/ TUBES Left     TONSILLECTOMY      TRIGGER FINGER RELEASE Right        Social History  Ms. Foster  reports that she has quit smoking. She has never used smokeless tobacco. She reports that she does not currently use alcohol. She reports that she does not currently use drugs.    Family History  Ms.'s Foster family history is not on file.    Review of Systems  Review of Systems   Neurological:  Positive for tingling, sensory change, seizures and headaches.   All other systems reviewed and are negative.     Objective:   BP (!) 150/90 (BP Location: Left arm, Patient Position: Sitting, BP Method: Large (Manual))   Pulse 103   Temp 97.1 °F (36.2 °C) (Temporal)   Resp 18   Ht 5' 6" (1.676 m)   Wt (!) 142 kg (313 lb)   SpO2 98%   BMI 50.52 kg/m²    NEUROLOGICAL EXAMINATION:     MENTAL STATUS   Oriented to person, place, and time.   Level of consciousness: drowsy  Knowledge: good.     CRANIAL NERVES   Cranial nerves II through XII intact.     MOTOR EXAM     Strength   Strength 5/5 throughout.     GAIT AND COORDINATION     Gait  Gait: normal       Physical Exam  Vitals reviewed.   Constitutional:       Appearance: She is obese.   Neurological:      Mental Status: She is alert and oriented to person, place, and time. Mental status is at baseline.      Cranial Nerves: Cranial nerves 2-12 are intact.      Motor: Motor strength is normal.     Gait: Gait is intact.          Assessment:     Nonintractable epilepsy without status epilepticus, unspecified epilepsy type         Primary Diagnosis and ICD10  Nonintractable epilepsy without status epilepticus, unspecified epilepsy type [G40.909]    Plan:     Patient Instructions   Cont Tegretol as tolerated   Needs CMP per PCP to eval sodium levels while on above   Improve sleep habits   Healthier lifestyle habits   Avoid any triggers "   Incr phsy activity - PT/Rehab pending  Recommend f/u with [facility]   F/u 6 months          Medications Discontinued During This Encounter   Medication Reason    estradioL (CLIMARA) 0.0375 mg/24 hr     etanercept (ENBREL) 25 mg (1 mL) SolR injection        Requested Prescriptions      No prescriptions requested or ordered in this encounter        respiratory distress

## 2024-02-13 ENCOUNTER — TELEPHONE (OUTPATIENT)
Dept: ORTHOPEDICS | Facility: CLINIC | Age: 54
End: 2024-02-13
Payer: COMMERCIAL

## 2024-02-20 DIAGNOSIS — Z96.653 HISTORY OF TOTAL BILATERAL KNEE REPLACEMENT (TKR): Primary | ICD-10-CM

## 2024-02-21 ENCOUNTER — OFFICE VISIT (OUTPATIENT)
Dept: ORTHOPEDICS | Facility: CLINIC | Age: 54
End: 2024-02-21
Payer: COMMERCIAL

## 2024-02-21 ENCOUNTER — HOSPITAL ENCOUNTER (OUTPATIENT)
Dept: RADIOLOGY | Facility: HOSPITAL | Age: 54
Discharge: HOME | End: 2024-02-21
Attending: NURSE PRACTITIONER
Payer: COMMERCIAL

## 2024-02-21 VITALS — BODY MASS INDEX: 47.09 KG/M2 | HEIGHT: 66 IN | WEIGHT: 293 LBS

## 2024-02-21 DIAGNOSIS — Z96.653 HISTORY OF TOTAL BILATERAL KNEE REPLACEMENT (TKR): Primary | ICD-10-CM

## 2024-02-21 DIAGNOSIS — Z96.653 HISTORY OF TOTAL BILATERAL KNEE REPLACEMENT (TKR): ICD-10-CM

## 2024-02-21 PROCEDURE — 99215 OFFICE O/P EST HI 40 MIN: CPT | Mod: PBBFAC,25 | Performed by: NURSE PRACTITIONER

## 2024-02-21 PROCEDURE — 99214 OFFICE O/P EST MOD 30 MIN: CPT | Mod: S$PBB,,, | Performed by: NURSE PRACTITIONER

## 2024-02-21 PROCEDURE — 1160F RVW MEDS BY RX/DR IN RCRD: CPT | Mod: CPTII,,, | Performed by: NURSE PRACTITIONER

## 2024-02-21 PROCEDURE — 73562 X-RAY EXAM OF KNEE 3: CPT | Mod: 26,50,, | Performed by: RADIOLOGY

## 2024-02-21 PROCEDURE — 73562 X-RAY EXAM OF KNEE 3: CPT | Mod: TC,50

## 2024-02-21 PROCEDURE — 3008F BODY MASS INDEX DOCD: CPT | Mod: CPTII,,, | Performed by: NURSE PRACTITIONER

## 2024-02-21 PROCEDURE — 1159F MED LIST DOCD IN RCRD: CPT | Mod: CPTII,,, | Performed by: NURSE PRACTITIONER

## 2024-02-21 NOTE — PATIENT INSTRUCTIONS
Discussed in detail with the patient that intra-articular steroid injections are not indicated with total knee replacements.  Also discussed that would total knee replacement there was no longer any articular cartilage in her knees therefore she was not having arthritic pain.  I do not doubt she was having discomfort.  We will have her return 1st available with Dr. Toussaint to discussed further treatment options.  No x-rays at that time unless clinically warranted.

## 2024-02-21 NOTE — PROGRESS NOTES
53-year-old female presents ambulatory orthopedic clinic requesting intra-articular steroid injection bilateral knee for knee pain.  States arthritis is hurting her knees.  She was known to Dr. Toussaint having undergone right total knee replacement arthroplasty 05/19/2022 and left total knee replacement arthroplasty 12/07/2021.  She denies any injury or trauma.  She states she has anterior knee pain.  States she has completed physical therapy.  States she does use ibuprofen as well as Percocet but pain is not managed.  She has had the benefit of topical NSAID medication as well.    X-ray: X-rays today of bilateral knees AP, lateral and sunrise view shows total knee components bilaterally.  No obvious loosening of the components.  No evidence of acute fracture, dislocation or pathologic bone noted.    PE:  She is noted be ambulating independently no perceptible limp.  Physical exam right knee skin is warm dry and intact.  Well-healed anterior midline surgical scars noted.  No effusion appreciated clinically.  Range of motion knee 0° extension further flexion approximately 95°.  There was excellent ligamentous balance instability noted clinically.  Physical exam left knee skin is warm dry and intact.  Well-healed anterior midline surgical scars noted.  No effusion appreciated clinically.  Range of motion of the left knee 0° extension further flexion approximately 90°.  There was excellent ligamentous balance instability noted.      Impression:  History of total knee replacement arthroplasty-bilateral     Plan:  Discussed in detail with the patient that intra-articular steroid injections are not indicated with total knee replacements.  Also discussed that would total knee replacement there was no longer any articular cartilage in her knees therefore she was not having arthritic pain.  I do not doubt she was having discomfort.  We will have her return 1st available with Dr. Toussaint to discussed further treatment  options.  No x-rays at that time unless clinically warranted.

## 2024-05-15 ENCOUNTER — OFFICE VISIT (OUTPATIENT)
Dept: OBSTETRICS AND GYNECOLOGY | Facility: CLINIC | Age: 54
End: 2024-05-15
Payer: COMMERCIAL

## 2024-05-15 VITALS
SYSTOLIC BLOOD PRESSURE: 136 MMHG | WEIGHT: 293 LBS | BODY MASS INDEX: 47.09 KG/M2 | DIASTOLIC BLOOD PRESSURE: 74 MMHG | HEIGHT: 66 IN

## 2024-05-15 DIAGNOSIS — Z01.419 WELL WOMAN EXAM WITH ROUTINE GYNECOLOGICAL EXAM: Primary | ICD-10-CM

## 2024-05-15 DIAGNOSIS — Z12.72 SPECIAL SCREENING FOR MALIGNANT NEOPLASMS, VAGINA: ICD-10-CM

## 2024-05-15 DIAGNOSIS — N63.13 MASS OF LOWER OUTER QUADRANT OF RIGHT BREAST: ICD-10-CM

## 2024-05-15 PROCEDURE — 99215 OFFICE O/P EST HI 40 MIN: CPT | Mod: PBBFAC | Performed by: OBSTETRICS & GYNECOLOGY

## 2024-05-15 PROCEDURE — 88142 CYTOPATH C/V THIN LAYER: CPT | Mod: TC,GCY | Performed by: OBSTETRICS & GYNECOLOGY

## 2024-05-15 PROCEDURE — 3075F SYST BP GE 130 - 139MM HG: CPT | Mod: CPTII,,, | Performed by: OBSTETRICS & GYNECOLOGY

## 2024-05-15 PROCEDURE — 1159F MED LIST DOCD IN RCRD: CPT | Mod: CPTII,,, | Performed by: OBSTETRICS & GYNECOLOGY

## 2024-05-15 PROCEDURE — 3008F BODY MASS INDEX DOCD: CPT | Mod: CPTII,,, | Performed by: OBSTETRICS & GYNECOLOGY

## 2024-05-15 PROCEDURE — 87624 HPV HI-RISK TYP POOLED RSLT: CPT | Mod: ,,, | Performed by: CLINICAL MEDICAL LABORATORY

## 2024-05-15 PROCEDURE — G0101 CA SCREEN;PELVIC/BREAST EXAM: HCPCS | Mod: S$PBB,,, | Performed by: OBSTETRICS & GYNECOLOGY

## 2024-05-15 PROCEDURE — 3078F DIAST BP <80 MM HG: CPT | Mod: CPTII,,, | Performed by: OBSTETRICS & GYNECOLOGY

## 2024-05-15 NOTE — PROGRESS NOTES
Subjective:       Patient ID: Marian Foster is a 53 y.o. female.    Chief Complaint: Well Woman (Here for check up-she is c/o knot in right breast for 2 months, some tenderness noted. )    Presents new to my practice.      Presents for routine gyn check.      In addition patient has felt a knot in her right breast over the last 2 months.  Review of Systems      Objective:      Physical Exam  Exam conducted with a chaperone present.   HENT:      Head: Normocephalic.      Nose: Nose normal.   Eyes:      Pupils: Pupils are equal, round, and reactive to light.   Cardiovascular:      Rate and Rhythm: Normal rate.   Pulmonary:      Effort: Pulmonary effort is normal.      Breath sounds: Normal breath sounds.   Chest:      Comments: On breast examination she has had a previous reduction.  The left breasts without palpable masses.  However on examination of the right breast there has an obvious 2 cm firm nonmovable nodule at 9 o'clock position of the right breast adjacent to the areolar tissue no nipple dimpling.    Appointment will be made with Dr. Torres for further evaluation  Abdominal:      General: Abdomen is flat.      Palpations: Abdomen is soft.   Genitourinary:     General: Normal vulva.      Vagina: Normal.      Cervix: Normal.      Uterus: Normal.       Adnexa: Right adnexa normal and left adnexa normal.      Rectum: Normal.      Comments: Vault normal upon evaluation of the vaginal cuff no obvious abnormality was noted.  Slight dimpling of the vaginal cuff I decided to take Pap.  However she has had previous hysterectomy in the distant past.      Bimanual exam including rectovaginal examination was unremarkable hemoccult was negative colonoscopy screening done 2017 she does have a follow-up appointment with her gastroenterologist May 30th.  Musculoskeletal:         General: Normal range of motion.      Cervical back: Full passive range of motion without pain, normal range of motion and neck supple.   Skin:      General: Skin is dry.   Neurological:      General: No focal deficit present.      Mental Status: She is alert.   Psychiatric:         Attention and Perception: Attention normal.         Mood and Affect: Mood normal.       Assessment:       1. Well woman exam with routine gynecological exam    2. Special screening for malignant neoplasms, vagina    3. Mass of lower outer quadrant of right breast        Plan:       Patient Instructions   Discussed normal gyn examination except for concerning nodule noted in right breast.      Appointment made with Dr. Torres      She will keep follow-up appointment with her gastroenterologist.      Continue routine glucose cholesterol testing by primary care provider.      Follow-up Pap of the vaginal cuff taken today.

## 2024-05-15 NOTE — PATIENT INSTRUCTIONS
Discussed normal gyn examination except for concerning nodule noted in right breast.      Appointment made with Dr. Torres      She will keep follow-up appointment with her gastroenterologist.      Continue routine glucose cholesterol testing by primary care provider.      Follow-up Pap of the vaginal cuff taken today.      Patient has used estrogen replacement therapy in the past I have discussed my recommendations for no further estrogen

## 2024-05-17 LAB
GH SERPL-MCNC: NORMAL NG/ML
INSULIN SERPL-ACNC: NORMAL U[IU]/ML
LAB AP CLINICAL INFORMATION: NORMAL
LAB AP GYN INTERPRETATION: NEGATIVE
LAB AP PAP DISCLAIMER COMMENTS: NORMAL
RENIN PLAS-CCNC: NORMAL NG/ML/H

## 2024-05-20 DIAGNOSIS — Z01.419 WELL WOMAN EXAM WITH ROUTINE GYNECOLOGICAL EXAM: Primary | ICD-10-CM

## 2024-05-21 ENCOUNTER — PATIENT MESSAGE (OUTPATIENT)
Dept: OBSTETRICS AND GYNECOLOGY | Facility: CLINIC | Age: 54
End: 2024-05-21
Payer: COMMERCIAL

## 2024-05-21 ENCOUNTER — HOSPITAL ENCOUNTER (OUTPATIENT)
Dept: RADIOLOGY | Facility: HOSPITAL | Age: 54
Discharge: HOME | End: 2024-05-21
Attending: OBSTETRICS & GYNECOLOGY
Payer: COMMERCIAL

## 2024-05-21 DIAGNOSIS — N63.13 MASS OF LOWER OUTER QUADRANT OF RIGHT BREAST: ICD-10-CM

## 2024-05-21 LAB
HPV 16: NEGATIVE
HPV 18: NEGATIVE
HPV OTHER: NEGATIVE

## 2024-05-21 PROCEDURE — 77062 BREAST TOMOSYNTHESIS BI: CPT | Mod: TC

## 2024-05-21 PROCEDURE — 76641 ULTRASOUND BREAST COMPLETE: CPT | Mod: TC,50

## 2024-05-21 PROCEDURE — 76641 ULTRASOUND BREAST COMPLETE: CPT | Mod: 26,50,, | Performed by: RADIOLOGY

## 2024-05-21 PROCEDURE — 77066 DX MAMMO INCL CAD BI: CPT | Mod: 26,,, | Performed by: RADIOLOGY

## 2024-05-21 PROCEDURE — 77062 BREAST TOMOSYNTHESIS BI: CPT | Mod: 26,,, | Performed by: RADIOLOGY

## 2024-05-30 ENCOUNTER — PATIENT MESSAGE (OUTPATIENT)
Dept: GASTROENTEROLOGY | Facility: CLINIC | Age: 54
End: 2024-05-30
Payer: COMMERCIAL

## 2024-05-30 ENCOUNTER — TELEPHONE (OUTPATIENT)
Dept: GASTROENTEROLOGY | Facility: CLINIC | Age: 54
End: 2024-05-30
Payer: COMMERCIAL

## 2024-05-30 ENCOUNTER — OFFICE VISIT (OUTPATIENT)
Dept: GASTROENTEROLOGY | Facility: CLINIC | Age: 54
End: 2024-05-30
Payer: COMMERCIAL

## 2024-05-30 ENCOUNTER — HOSPITAL ENCOUNTER (OUTPATIENT)
Dept: RADIOLOGY | Facility: HOSPITAL | Age: 54
Discharge: HOME | End: 2024-05-30
Payer: COMMERCIAL

## 2024-05-30 VITALS
SYSTOLIC BLOOD PRESSURE: 148 MMHG | HEART RATE: 81 BPM | DIASTOLIC BLOOD PRESSURE: 100 MMHG | HEIGHT: 66 IN | BODY MASS INDEX: 47.09 KG/M2 | WEIGHT: 293 LBS

## 2024-05-30 DIAGNOSIS — R10.30 LOWER ABDOMINAL PAIN: ICD-10-CM

## 2024-05-30 DIAGNOSIS — R10.30 LOWER ABDOMINAL PAIN: Primary | ICD-10-CM

## 2024-05-30 DIAGNOSIS — R19.8 ALTERNATING CONSTIPATION AND DIARRHEA: ICD-10-CM

## 2024-05-30 DIAGNOSIS — Z80.0 FAMILY HISTORY OF COLON CANCER: ICD-10-CM

## 2024-05-30 PROCEDURE — 3077F SYST BP >= 140 MM HG: CPT | Mod: CPTII,,,

## 2024-05-30 PROCEDURE — 3080F DIAST BP >= 90 MM HG: CPT | Mod: CPTII,,,

## 2024-05-30 PROCEDURE — 99215 OFFICE O/P EST HI 40 MIN: CPT | Mod: S$PBB,,,

## 2024-05-30 PROCEDURE — 1159F MED LIST DOCD IN RCRD: CPT | Mod: CPTII,,,

## 2024-05-30 PROCEDURE — 99215 OFFICE O/P EST HI 40 MIN: CPT | Mod: PBBFAC,25

## 2024-05-30 PROCEDURE — 1160F RVW MEDS BY RX/DR IN RCRD: CPT | Mod: CPTII,,,

## 2024-05-30 PROCEDURE — 74018 RADEX ABDOMEN 1 VIEW: CPT | Mod: 26,,, | Performed by: STUDENT IN AN ORGANIZED HEALTH CARE EDUCATION/TRAINING PROGRAM

## 2024-05-30 PROCEDURE — 3008F BODY MASS INDEX DOCD: CPT | Mod: CPTII,,,

## 2024-05-30 PROCEDURE — 74018 RADEX ABDOMEN 1 VIEW: CPT | Mod: TC

## 2024-05-30 RX ORDER — POLYETHYLENE GLYCOL 3350, SODIUM SULFATE ANHYDROUS, SODIUM BICARBONATE, SODIUM CHLORIDE, POTASSIUM CHLORIDE 236; 22.74; 6.74; 5.86; 2.97 G/4L; G/4L; G/4L; G/4L; G/4L
4 POWDER, FOR SOLUTION ORAL ONCE
Qty: 4000 ML | Refills: 0 | Status: SHIPPED | OUTPATIENT
Start: 2024-05-30 | End: 2024-05-30

## 2024-05-30 RX ORDER — DICYCLOMINE HYDROCHLORIDE 10 MG/1
10 CAPSULE ORAL 3 TIMES DAILY PRN
Qty: 120 CAPSULE | Refills: 0 | Status: SHIPPED | OUTPATIENT
Start: 2024-05-30

## 2024-05-30 NOTE — PROGRESS NOTES
Gastroenterology Clinic Note    Patient ID: [id number]   Referring MD: No ref. provider found   Chief Complaint:   Chief Complaint   Patient presents with    Abdominal Pain    Establish Care     Discuss C scope    Abdominal Cramping    Nausea    Emesis    Diarrhea    Constipation       History of Present Illness   Marian Foster is an 53 y.o. WF who is referred for abdominal pain.  Patient reports chronic abdominal pain, nausea, and alternating constipation and diarrhea.  Describes pain as severe when present.  She has increased fiber in her diet without improvement.  She denies any hematochezia or melena.  She denies unintentional weight loss.  She does have a history of colon polyps as well as family history of CRC in her father.  She is due for screening colonoscopy.  Last colonoscopy with Dr. Lei.    Review of Systems   Constitutional:  Negative for weight loss.   Gastrointestinal:  Positive for abdominal pain, constipation, diarrhea and nausea. Negative for blood in stool and melena.       Past Medical History      Past Medical History:   Diagnosis Date    Arthritis     COPD (chronic obstructive pulmonary disease)     Disorder of kidney and ureter     Hypertension     Liver disease     Osteoarthritis     Seizures     Sleep apnea        Past Surgical History     Past Surgical History:   Procedure Laterality Date    ABDOMINAL SURGERY      x3    APPENDECTOMY      BREAST SURGERY      CHOLECYSTECTOMY      GANGLION CYST EXCISION Left     HYSTERECTOMY      KNEE ARTHROSCOPY      LAPAROSCOPY      lithrotripsy      MYRINGOTOMY W/ TUBES Left     OOPHORECTOMY      TONSILLECTOMY      TOTAL REDUCTION MAMMOPLASTY      TRIGGER FINGER RELEASE Right        Allergies     Review of patient's allergies indicates:   Allergen Reactions    Fluoxetine Other (See Comments)     Other reaction(s): Unknown  Suicidal    Other reaction(s): Unknown  Suicidal    Other reaction(s): Unknown Suicidal    Meperidine Other (See Comments)     Other  reaction(s): Unknown  Seizures    Other reaction(s): Unknown  Seizures    Other reaction(s): Unknown Seizures    Tetracyclines Other (See Comments)     Other reaction(s): Unknown  Childhood unknown allergy    Other reaction(s): Unknown  Childhood unknown allergy    Other reaction(s): Unknown Childhood unknown allergy    Adhesive tape-silicones Rash    Erythromycin      Other reaction(s): Unknown  Other reaction(s): Unknown  Other reaction(s): Unknown    Celecoxib      Other reaction(s): Unknown  Other reaction(s): Unknown  Other reaction(s): Unknown    Cyclobenzaprine      depression    Adhesive Rash     Other reaction(s): Unknown  Note: plastic tape       Immunization History     There is no immunization history on file for this patient.    Past Family History      Family History   Problem Relation Name Age of Onset    Breast cancer Sister      Breast cancer Paternal Grandmother         Past Social History      Social History     Socioeconomic History    Marital status:    Tobacco Use    Smoking status: Former    Smokeless tobacco: Never   Substance and Sexual Activity    Alcohol use: Not Currently    Drug use: Not Currently       Current Medications     Outpatient Medications Marked as Taking for the 5/30/24 encounter (Office Visit) with Nancy Santillan FNP   Medication Sig Dispense Refill    apixaban (ELIQUIS) 2.5 mg Tab Take 1 tablet (2.5 mg total) by mouth 2 (two) times daily. 24 tablet 0    carBAMazepine (TEGRETOL XR) 100 MG 12 hr tablet Take 5 tablets (500 mg total) by mouth 3 (three) times daily. 540 tablet 3    cholecalciferol, vitamin D3, 1,250 mcg (50,000 unit) capsule Vitamin D3 1.25 MG (95011 UT) Oral Capsule QTY: 12 capsule Days: 30 Refills: 3  Written: 12/05/21 Patient Instructions: 1 capsule weekly x 12 weeks      clotrimazole-betamethasone 1-0.05% (LOTRISONE) cream       cyanocobalamin (VITAMIN B-12) 1000 MCG tablet Take 2,000 mcg by mouth once daily.      EScitalopram oxalate (LEXAPRO) 20  "MG tablet Take 20 mg by mouth once daily.      febuxostat (ULORIC) 40 mg Tab Take 40 mg by mouth once daily.      fluconazole (DIFLUCAN) 100 MG tablet Take 100 mg by mouth once daily.      furosemide (LASIX) 20 MG tablet Take 20 mg by mouth once daily.      ibuprofen (ADVIL,MOTRIN) 800 MG tablet Take 800 mg by mouth every 6 (six) hours as needed for Pain.      magnesium 200 mg Tab Magnesium 400 MG Oral Tablet QTY: 30 tablet Days: 30 Refills: 5  Written: 10/17/22 Patient Instructions: Take one po qd      mupirocin (BACTROBAN) 2 % ointment SMARTSI Application Topical 2-3 Times Daily      oxyCODONE-acetaminophen (PERCOCET)  mg per tablet       phentermine (ADIPEX-P) 37.5 mg tablet       potassium chloride SA (K-DUR,KLOR-CON) 20 MEQ tablet       predniSONE (DELTASONE) 10 MG tablet Take 10 mg by mouth once daily.      promethazine (PHENERGAN) 25 MG tablet Take 25 mg by mouth once daily.      triamcinolone acetonide 0.025% (KENALOG) 0.025 % cream Apply to AA on body BID PRN flares tapering with improvement 80 g 6    VENTOLIN HFA 90 mcg/actuation inhaler       zafirlukast (ACCOLATE) 10 MG Tab Take 10 mg by mouth once daily.          I have reviewed the current medications, allergies, vital signs, past medical and surgical history, family medical history, and social history for this encounter and agree with all findings.    OBJECTIVE    Physical Exam    BP (!) 148/100   Pulse 81   Ht 5' 6" (1.676 m)   Wt (!) 154.7 kg (341 lb)   BMI 55.04 kg/m²   GEN: Well appearing, cooperative, NAD.  Obese.  NECK: Supple, no LAD  CV: Normal rate  RESP: Unlabored  ABD: ND, no guarding  EXT: No clubbing, cyanosis, or edema  SKIN: Warm and dry  NEURO: AAO x4.     LABS    CBC (with or without Differential):   Lab Results   Component Value Date    WBC 13.62 (H) 2022    HGB 11.2 (L) 2022    HCT 34.2 (L) 2022    MCV 88.8 2022    MCH 29.1 2022    MCHC 32.7 2022    RDW 13.3 2022     " 05/20/2022    MPV 10.3 05/20/2022    NEUTOPHILPCT 64.9 05/20/2022    DIFFTYPE Auto 05/20/2022     BMP/CMP:   Lab Results   Component Value Date     05/20/2022    K 3.9 05/20/2022     05/20/2022    CO2 25 05/20/2022    BUN 12 05/20/2022    CREATININE 0.71 05/20/2022     (H) 05/20/2022    CALCIUM 8.3 (L) 05/20/2022    ALBUMIN 3.3 (L) 05/20/2022    AST 27 05/20/2022    ALT 42 05/20/2022    ALKPHOS 98 05/20/2022        IMAGING  No pertinent imaging available.    ASSESSMENT  Marian Foster is a 53 y.o. WF with history of hypertension who is referred for abdominal pain.    1. Lower abdominal pain    2. Alternating constipation and diarrhea    3. Family history of colon cancer           PLAN    - x-ray KUB to assess stool burden; I suspect that patient's issues are related to IBS we will prescribe Bentyl as needed; she will likely benefit from a daily bowel regimen  - schedule colonoscopy for CRC screening; personal history of colon polyps and family history of CRC in [place]  There are no Patient Instructions on file for this visit.      Orders Placed This Encounter   Procedures    X-Ray KUB     Standing Status:   Future     Number of Occurrences:   1     Standing Expiration Date:   5/30/2025     Order Specific Question:   May the Radiologist modify the order per protocol to meet the clinical needs of the patient?     Answer:   Yes     Order Specific Question:   Release to patient     Answer:   Immediate         The risks and benefits of my recommendations, as well as other treatment options were discussed with the patient today. All questions were answered.    45 minutes of total time spent on the encounter, which includes face to face time and non-face to face time preparing to see the patient (eg, review of tests), obtaining and/or reviewing separately obtained history, documenting clinical information in the electronic or other health record, Independently interpreting results (not separately reported)  and communicating results to the patient/family/caregiver, or care coordination (not separately reported).        Nancy Santillan, ROBERTP/ACNP  [facility]

## 2024-05-30 NOTE — TELEPHONE ENCOUNTER
Patient aware and verbalized good understanding. Bowel cleanse instructions sent through [employer].

## 2024-05-30 NOTE — TELEPHONE ENCOUNTER
----- Message from SYLVIA Saxena sent at 5/30/2024  2:29 PM CDT -----  Her xray confirms constipation. Would recommend a bowel cleanse followed by daily bowel regimen

## 2024-06-10 DIAGNOSIS — Z80.0 FAMILY HISTORY OF COLON CANCER: Primary | ICD-10-CM

## 2024-06-10 RX ORDER — POLYETHYLENE GLYCOL 3350, SODIUM SULFATE ANHYDROUS, SODIUM BICARBONATE, SODIUM CHLORIDE, POTASSIUM CHLORIDE 236; 22.74; 6.74; 5.86; 2.97 G/4L; G/4L; G/4L; G/4L; G/4L
4 POWDER, FOR SOLUTION ORAL ONCE
Qty: 4000 ML | Refills: 0 | Status: SHIPPED | OUTPATIENT
Start: 2024-06-10 | End: 2024-06-10

## 2024-06-24 ENCOUNTER — ANESTHESIA (OUTPATIENT)
Dept: GASTROENTEROLOGY | Facility: HOSPITAL | Age: 54
End: 2024-06-24
Payer: COMMERCIAL

## 2024-06-24 ENCOUNTER — ANESTHESIA EVENT (OUTPATIENT)
Dept: GASTROENTEROLOGY | Facility: HOSPITAL | Age: 54
End: 2024-06-24
Payer: COMMERCIAL

## 2024-06-24 ENCOUNTER — HOSPITAL ENCOUNTER (OUTPATIENT)
Dept: GASTROENTEROLOGY | Facility: HOSPITAL | Age: 54
Discharge: HOME | End: 2024-06-24
Admitting: INTERNAL MEDICINE
Payer: COMMERCIAL

## 2024-06-24 VITALS
HEART RATE: 75 BPM | DIASTOLIC BLOOD PRESSURE: 44 MMHG | SYSTOLIC BLOOD PRESSURE: 110 MMHG | BODY MASS INDEX: 47.09 KG/M2 | TEMPERATURE: 98 F | WEIGHT: 293 LBS | HEIGHT: 66 IN | OXYGEN SATURATION: 95 % | RESPIRATION RATE: 20 BRPM

## 2024-06-24 DIAGNOSIS — Z80.0 FAMILY HISTORY OF COLON CANCER: ICD-10-CM

## 2024-06-24 PROCEDURE — D9220A PRA ANESTHESIA: Mod: PT,,,

## 2024-06-24 PROCEDURE — 37000009 HC ANESTHESIA EA ADD 15 MINS

## 2024-06-24 PROCEDURE — 45385 COLONOSCOPY W/LESION REMOVAL: CPT | Mod: PT | Performed by: INTERNAL MEDICINE

## 2024-06-24 PROCEDURE — 37000008 HC ANESTHESIA 1ST 15 MINUTES

## 2024-06-24 PROCEDURE — 25000003 PHARM REV CODE 250

## 2024-06-24 PROCEDURE — 88305 TISSUE EXAM BY PATHOLOGIST: CPT | Mod: 26,,, | Performed by: PATHOLOGY

## 2024-06-24 PROCEDURE — 88305 TISSUE EXAM BY PATHOLOGIST: CPT | Mod: TC,SUR | Performed by: INTERNAL MEDICINE

## 2024-06-24 PROCEDURE — 45385 COLONOSCOPY W/LESION REMOVAL: CPT | Mod: PT,,, | Performed by: INTERNAL MEDICINE

## 2024-06-24 PROCEDURE — 63600175 PHARM REV CODE 636 W HCPCS

## 2024-06-24 RX ORDER — LIDOCAINE HYDROCHLORIDE 20 MG/ML
INJECTION, SOLUTION EPIDURAL; INFILTRATION; INTRACAUDAL; PERINEURAL
Status: DISCONTINUED | OUTPATIENT
Start: 2024-06-24 | End: 2024-06-24

## 2024-06-24 RX ORDER — ETANERCEPT 25 MG
25 KIT SUBCUTANEOUS WEEKLY
COMMUNITY

## 2024-06-24 RX ORDER — SODIUM CHLORIDE 9 MG/ML
INJECTION, SOLUTION INTRAVENOUS CONTINUOUS PRN
Status: DISCONTINUED | OUTPATIENT
Start: 2024-06-24 | End: 2024-06-24

## 2024-06-24 RX ORDER — SODIUM CHLORIDE 0.9 % (FLUSH) 0.9 %
10 SYRINGE (ML) INJECTION
Status: DISCONTINUED | OUTPATIENT
Start: 2024-06-24 | End: 2024-06-25 | Stop reason: HOSPADM

## 2024-06-24 RX ORDER — PROPOFOL 10 MG/ML
VIAL (ML) INTRAVENOUS
Status: DISCONTINUED | OUTPATIENT
Start: 2024-06-24 | End: 2024-06-24

## 2024-06-24 RX ADMIN — PROPOFOL 40 MG: 10 INJECTION, EMULSION INTRAVENOUS at 02:06

## 2024-06-24 RX ADMIN — LIDOCAINE HYDROCHLORIDE 100 MG: 20 INJECTION, SOLUTION INTRAVENOUS at 02:06

## 2024-06-24 RX ADMIN — SODIUM CHLORIDE: 9 INJECTION, SOLUTION INTRAVENOUS at 01:06

## 2024-06-24 RX ADMIN — PROPOFOL 150 MG: 10 INJECTION, EMULSION INTRAVENOUS at 02:06

## 2024-06-24 NOTE — ANESTHESIA PREPROCEDURE EVALUATION
06/24/2024  Marian Foster is a 53 y.o., female.      Pre-op Assessment    I have reviewed the Patient Summary Reports.     I have reviewed the Nursing Notes. I have reviewed the NPO Status.   I have reviewed the Medications.     Review of Systems  Anesthesia Hx:  No problems with previous Anesthesia                Cardiovascular:     Hypertension                                        Pulmonary:   COPD     Sleep Apnea                Renal/:  Chronic Renal Disease                Hepatic/GI:      Liver Disease,            Musculoskeletal:  Arthritis               Neurological:       Seizures                                Endocrine:        Morbid Obesity / BMI > 40      Physical Exam  General: Well nourished, Cooperative, Alert and Oriented    Airway:  Mallampati: II   Mouth Opening: Normal  TM Distance: Normal  Tongue: Normal  Neck ROM: Normal ROM    Dental:  Intact    Chest/Lungs:  Clear to auscultation, Normal Respiratory Rate    Heart:  Rate: Normal  Rhythm: Regular Rhythm  Sounds: Normal    Abdomen:  Normal, Soft, Nontender        Anesthesia Plan  Type of Anesthesia, risks & benefits discussed:    Anesthesia Type: Gen Natural Airway, MAC  Intra-op Monitoring Plan: Standard ASA Monitors  Post Op Pain Control Plan: multimodal analgesia and IV/PO Opioids PRN  Induction:  IV  Informed Consent: Informed consent signed with the Patient and all parties understand the risks and agree with anesthesia plan.  All questions answered.   ASA Score: 3  Day of Surgery Review of History & Physical: I have interviewed and examined the patient. I have reviewed the patient's H&P dated:     Ready For Surgery From Anesthesia Perspective.     .

## 2024-06-24 NOTE — TRANSFER OF CARE
"Anesthesia Transfer of Care Note    Patient: Marian Foster    Procedure(s) Performed: colonoscopy    Patient location: GI    Anesthesia Type: general and MAC    Transport from OR: Transported from OR on room air with adequate spontaneous ventilation    Post pain: adequate analgesia    Post assessment: no apparent anesthetic complications    Post vital signs: stable    Level of consciousness: awake, alert and oriented    Nausea/Vomiting: no nausea/vomiting    Complications: none    Transfer of care protocol was followedComments: Refer to nursing note for pain nancy score upon discharge from recovery      Last vitals: Visit Vitals  BP (!) 122/40   Pulse 82   Temp 36.6 °C (97.9 °F)   Resp 20   Ht 5' 6" (1.676 m)   Wt (!) 161 kg (355 lb)   SpO2 (!) 92%   Breastfeeding No   BMI 57.30 kg/m²     "

## 2024-06-24 NOTE — ANESTHESIA POSTPROCEDURE EVALUATION
Anesthesia Post Evaluation    Patient: Marian Foster    Procedure(s) Performed: colonoscopy    Final Anesthesia Type: MAC      Patient location during evaluation: GI PACU  Patient participation: Yes- Able to Participate  Level of consciousness: awake and alert  Post-procedure vital signs: reviewed and stable  Pain management: adequate  Airway patency: patent    PONV status at discharge: No PONV  Anesthetic complications: no      Cardiovascular status: blood pressure returned to baseline  Respiratory status: unassisted and spontaneous ventilation  Hydration status: euvolemic  Follow-up not needed.  Comments: Refer to nursing note for pain and nancy score upon discharge from recovery              Vitals Value Taken Time   /50 06/24/24 1449   Temp 36.6 °C (97.9 °F) 06/24/24 1432   Pulse 67 06/24/24 1451   Resp 27 06/24/24 1451   SpO2 93 % 06/24/24 1451   Vitals shown include unfiled device data.      No case tracking events are documented in the log.      Pain/Nancy Score: Nancy Score: 10 (6/24/2024  2:39 PM)

## 2024-06-24 NOTE — DISCHARGE INSTRUCTIONS
Procedure Date  6/24/24     Impression  Overall Impression:   Subcentimeter polyp in the transverse colon was removed with cold snare  The ileocecal valve, cecum, ascending colon, descending colon and sigmoid colon appeared normal.  (grade 2) hemorrhoids        Recommendation  Await pathology results   Repeat colonoscopy in 5 years; +FM CRC in father      NO DRIVING, OPERATING EQUIPMENT, OR SIGNING LEGAL DOCUMENTS FOR 24 HOURS.  THE NURSE WILL CALL YOU WITH YOUR BIOPSY RESULTS IN A FEW DAYS. IF YOU HAVE  [facility] MYCHART YOUR RESULTS WILL APPEAR THERE AS WELL.  Please call the GI Lab if you have any nausea, vomiting, or abdominal pain.

## 2024-06-24 NOTE — H&P
Gastroenterology Pre-procedure H&P    History of Present Illness    Marian Foster is a 53 y.o. female that  has a past medical history of Arthritis, COPD (chronic obstructive pulmonary disease), Disorder of kidney and ureter, Hypertension, Liver disease, Osteoarthritis, Seizures, and Sleep apnea.     Patient here for routine surveillance. +FMH CRC in father    Patient denies wt loss, abdominal pain, diarrhea, melena/hematochezia, change in stool caliber, no anticoagulants.      Past Medical History:   Diagnosis Date    Arthritis     COPD (chronic obstructive pulmonary disease)     Disorder of kidney and ureter     Hypertension     Liver disease     Osteoarthritis     Seizures     Sleep apnea        Past Surgical History:   Procedure Laterality Date    ABDOMINAL SURGERY      x3    APPENDECTOMY      BREAST SURGERY      CHOLECYSTECTOMY      GANGLION CYST EXCISION Left     HYSTERECTOMY      KNEE ARTHROSCOPY      LAPAROSCOPY      lithrotripsy      MYRINGOTOMY W/ TUBES Left     OOPHORECTOMY      TONSILLECTOMY      TOTAL REDUCTION MAMMOPLASTY      TRIGGER FINGER RELEASE Right        Family History   Problem Relation Name Age of Onset    Breast cancer Sister      Breast cancer Paternal Grandmother         Social History     Socioeconomic History    Marital status:    Tobacco Use    Smoking status: Former    Smokeless tobacco: Never   Substance and Sexual Activity    Alcohol use: Not Currently    Drug use: Not Currently       Current Outpatient Medications   Medication Sig Dispense Refill    apixaban (ELIQUIS) 2.5 mg Tab Take 1 tablet (2.5 mg total) by mouth 2 (two) times daily. 24 tablet 0    baclofen (LIORESAL) 10 MG tablet Take 10 mg by mouth once daily. (Patient not taking: Reported on 5/15/2024)      carBAMazepine (TEGRETOL XR) 100 MG 12 hr tablet Take 5 tablets (500 mg total) by mouth 3 (three) times daily. 540 tablet 3    cholecalciferol, vitamin D3, 1,250 mcg (50,000 unit) capsule Vitamin D3 1.25 MG (90565  UT) Oral Capsule QTY: 12 capsule Days: 30 Refills: 3  Written: 21 Patient Instructions: 1 capsule weekly x 12 weeks      clotrimazole-betamethasone 1-0.05% (LOTRISONE) cream       cyanocobalamin (VITAMIN B-12) 1000 MCG tablet Take 2,000 mcg by mouth once daily.      dicyclomine (BENTYL) 10 MG capsule Take 1 capsule (10 mg total) by mouth 3 (three) times daily as needed (abdominal pain). 120 capsule 0    ergocalciferol (ERGOCALCIFEROL) 50,000 unit Cap Take 50,000 Units by mouth every 7 days. (Patient not taking: Reported on 5/15/2024)      EScitalopram oxalate (LEXAPRO) 20 MG tablet Take 20 mg by mouth once daily.      estradiol valerate (DELESTROGEN) 20 mg/mL injection  (Patient not taking: Reported on 5/15/2024)      febuxostat (ULORIC) 40 mg Tab Take 40 mg by mouth once daily.      fluconazole (DIFLUCAN) 100 MG tablet Take 100 mg by mouth once daily.      furosemide (LASIX) 20 MG tablet Take 20 mg by mouth once daily.      ibuprofen (ADVIL,MOTRIN) 800 MG tablet Take 800 mg by mouth every 6 (six) hours as needed for Pain.      magnesium 200 mg Tab Magnesium 400 MG Oral Tablet QTY: 30 tablet Days: 30 Refills: 5  Written: 10/17/22 Patient Instructions: Take one po qd      magnesium oxide (MAG-OX) 400 mg (241.3 mg magnesium) tablet Take 400 mg by mouth 2 (two) times daily. (Patient not taking: Reported on 5/15/2024)      mupirocin (BACTROBAN) 2 % ointment SMARTSI Application Topical 2-3 Times Daily      nabumetone (RELAFEN) 500 MG tablet Take 500 mg by mouth once daily. (Patient not taking: Reported on 5/15/2024)      ORENCIA CLICKJECT 125 mg/mL AtIn once a week. (Patient not taking: Reported on 5/15/2024)      oxyCODONE-acetaminophen (PERCOCET)  mg per tablet       phentermine (ADIPEX-P) 37.5 mg tablet       potassium chloride SA (K-DUR,KLOR-CON) 20 MEQ tablet       predniSONE (DELTASONE) 10 MG tablet Take 10 mg by mouth once daily.      pregabalin (LYRICA) 150 MG capsule Take 150 mg by mouth once  daily. (Patient not taking: Reported on 5/15/2024)      promethazine (PHENERGAN) 25 MG tablet Take 25 mg by mouth once daily.      triamcinolone acetonide 0.025% (KENALOG) 0.025 % cream Apply to AA on body BID PRN flares tapering with improvement 80 g 6    VENTOLIN HFA 90 mcg/actuation inhaler       zafirlukast (ACCOLATE) 10 MG Tab Take 10 mg by mouth once daily.       No current facility-administered medications for this encounter.       Review of patient's allergies indicates:   Allergen Reactions    Fluoxetine Other (See Comments)     Other reaction(s): Unknown  Suicidal    Other reaction(s): Unknown  Suicidal    Other reaction(s): Unknown Suicidal    Meperidine Other (See Comments)     Other reaction(s): Unknown  Seizures    Other reaction(s): Unknown  Seizures    Other reaction(s): Unknown Seizures    Tetracyclines Other (See Comments)     Other reaction(s): Unknown  Childhood unknown allergy    Other reaction(s): Unknown  Childhood unknown allergy    Other reaction(s): Unknown Childhood unknown allergy    Adhesive tape-silicones Rash    Erythromycin      Other reaction(s): Unknown  Other reaction(s): Unknown  Other reaction(s): Unknown    Celecoxib      Other reaction(s): Unknown  Other reaction(s): Unknown  Other reaction(s): Unknown    Cyclobenzaprine      depression    Adhesive Rash     Other reaction(s): Unknown  Note: plastic tape       Objective:  There were no vitals filed for this visit.     GEN: normal appearing, NAD, AAO x3  HENT: NCAT, anicteric, OP benign  CV: normal rate, regular rhythm  RESP: NABS, symmetric rise, unlabored  ABD: soft, ND, no guarding or TTP  SKIN: warm and dry  NEURO: grossly afocal    Assessment and Plan:    Proceed with:    Colonoscopy for family history of colon cancer, screening for colon cancer, h/o polyps    Avery Nava MD  Gastroenterology

## 2024-06-25 LAB
ESTROGEN SERPL-MCNC: NORMAL PG/ML
INSULIN SERPL-ACNC: NORMAL U[IU]/ML
LAB AP GROSS DESCRIPTION: NORMAL
LAB AP LABORATORY NOTES: NORMAL
T3RU NFR SERPL: NORMAL %

## 2024-08-05 ENCOUNTER — TELEPHONE (OUTPATIENT)
Dept: ORTHOPEDICS | Facility: CLINIC | Age: 54
End: 2024-08-05
Payer: COMMERCIAL

## 2024-10-17 ENCOUNTER — OFFICE VISIT (OUTPATIENT)
Dept: DERMATOLOGY | Facility: CLINIC | Age: 54
End: 2024-10-17
Payer: COMMERCIAL

## 2024-10-17 DIAGNOSIS — L82.1 SK (SEBORRHEIC KERATOSIS): ICD-10-CM

## 2024-10-17 DIAGNOSIS — L21.9 SEBORRHEIC DERMATITIS: ICD-10-CM

## 2024-10-17 DIAGNOSIS — Z80.8 FAMILY HISTORY OF MELANOMA: ICD-10-CM

## 2024-10-17 DIAGNOSIS — L57.8 OTHER SKIN CHANGES DUE TO CHRONIC EXPOSURE TO NONIONIZING RADIATION: Primary | ICD-10-CM

## 2024-10-17 DIAGNOSIS — D48.9 NEOPLASM OF UNCERTAIN BEHAVIOR: ICD-10-CM

## 2024-10-17 PROCEDURE — 88305 TISSUE EXAM BY PATHOLOGIST: CPT | Mod: 26,,, | Performed by: PATHOLOGY

## 2024-10-17 PROCEDURE — 88305 TISSUE EXAM BY PATHOLOGIST: CPT | Mod: TC,SUR | Performed by: DERMATOLOGY

## 2024-10-17 RX ORDER — KETOCONAZOLE 20 MG/ML
SHAMPOO, SUSPENSION TOPICAL
Qty: 120 ML | Refills: 4 | Status: SHIPPED | OUTPATIENT
Start: 2024-10-17

## 2024-10-17 RX ORDER — CLOBETASOL PROPIONATE 0.5 MG/ML
SOLUTION TOPICAL
Qty: 50 ML | Refills: 5 | Status: SHIPPED | OUTPATIENT
Start: 2024-10-17

## 2024-10-17 NOTE — PROGRESS NOTES
[facility]   Sveta Dillon MD    Patient Name: Marian Foster  Patient YOB: 1970   Date of Service: 10/17/24    CC: Full Skin Exam    HPI: Marian Foster is a 54 y.o. female presents today for a full skin exam.  Patient was last seen 08/14/2023 and dermatologic history includes family hx of melanoma. Patient is concerned today about a lesions  located on the right arm.  It has been present for 4 month(s). It has not been treated in the past.  Patient is also concerned about lesions located on the abdomen, scalp, and back.    Past Medical History:   Diagnosis Date    Arthritis     COPD (chronic obstructive pulmonary disease)     Disorder of kidney and ureter     Hypertension     Liver disease     Osteoarthritis     Seizures     Sleep apnea      Past Surgical History:   Procedure Laterality Date    ABDOMINAL SURGERY      x3    APPENDECTOMY      BREAST SURGERY      CHOLECYSTECTOMY      GANGLION CYST EXCISION Left     HYSTERECTOMY      KNEE ARTHROSCOPY      LAPAROSCOPY      lithrotripsy      MYRINGOTOMY W/ TUBES Left     OOPHORECTOMY      TONSILLECTOMY      TOTAL REDUCTION MAMMOPLASTY      TRIGGER FINGER RELEASE Right      Review of patient's allergies indicates:   Allergen Reactions    Fluoxetine Other (See Comments)     Other reaction(s): Unknown  Suicidal    Other reaction(s): Unknown  Suicidal    Other reaction(s): Unknown Suicidal    Meperidine Other (See Comments)     Other reaction(s): Unknown  Seizures    Other reaction(s): Unknown  Seizures    Other reaction(s): Unknown Seizures    Tetracyclines Other (See Comments)     Other reaction(s): Unknown  Childhood unknown allergy    Other reaction(s): Unknown  Childhood unknown allergy    Other reaction(s): Unknown Childhood unknown allergy    Adhesive tape-silicones Rash    Erythromycin      Other reaction(s): Unknown  Other reaction(s): Unknown  Other reaction(s): Unknown    Celecoxib      Other reaction(s): Unknown  Other reaction(s):  Unknown  Other reaction(s): Unknown    Cyclobenzaprine      depression    Adhesive Rash     Other reaction(s): Unknown  Note: plastic tape       Current Outpatient Medications:     carBAMazepine (TEGRETOL XR) 100 MG 12 hr tablet, Take 5 tablets (500 mg total) by mouth 3 (three) times daily., Disp: 540 tablet, Rfl: 3    cholecalciferol, vitamin D3, 1,250 mcg (50,000 unit) capsule, Vitamin D3 1.25 MG (52710 UT) Oral Capsule QTY: 12 capsule Days: 30 Refills: 3  Written: 12/05/21 Patient Instructions: 1 capsule weekly x 12 weeks, Disp: , Rfl:     clobetasoL (TEMOVATE) 0.05 % external solution, Apply to scalp  BID tapering with improvement, Disp: 50 mL, Rfl: 5    clotrimazole-betamethasone 1-0.05% (LOTRISONE) cream, , Disp: , Rfl:     cyanocobalamin (VITAMIN B-12) 1000 MCG tablet, Take 2,000 mcg by mouth once daily., Disp: , Rfl:     dicyclomine (BENTYL) 10 MG capsule, Take 1 capsule (10 mg total) by mouth 3 (three) times daily as needed (abdominal pain)., Disp: 120 capsule, Rfl: 0    ergocalciferol (ERGOCALCIFEROL) 50,000 unit Cap, Take 50,000 Units by mouth every 7 days., Disp: , Rfl:     EScitalopram oxalate (LEXAPRO) 20 MG tablet, Take 20 mg by mouth once daily., Disp: , Rfl:     etanercept (ENBREL) 25 mg (1 mL) SolR injection, Inject 25 mg into the skin once a week., Disp: , Rfl:     febuxostat (ULORIC) 40 mg Tab, Take 40 mg by mouth once daily., Disp: , Rfl:     fluconazole (DIFLUCAN) 100 MG tablet, Take 100 mg by mouth as needed., Disp: , Rfl:     furosemide (LASIX) 20 MG tablet, Take 20 mg by mouth as needed., Disp: , Rfl:     ibuprofen (ADVIL,MOTRIN) 800 MG tablet, Take 800 mg by mouth every 6 (six) hours as needed for Pain., Disp: , Rfl:     ketoconazole (NIZORAL) 2 % shampoo, Use as a scalp treatment 2-3 times a week for maintenance, massaging into scalp and leaving on for up to 3 minutes before rinsing, Disp: 120 mL, Rfl: 4    magnesium 200 mg Tab, Magnesium 400 MG Oral Tablet QTY: 30 tablet Days: 30 Refills:  5  Written: 10/17/22 Patient Instructions: Take one po qd, Disp: , Rfl:     mupirocin (BACTROBAN) 2 % ointment, SMARTSI Application Topical 2-3 Times Daily, Disp: , Rfl:     nabumetone (RELAFEN) 500 MG tablet, Take 500 mg by mouth once daily. (Patient not taking: Reported on 5/15/2024), Disp: , Rfl:     oxyCODONE-acetaminophen (PERCOCET)  mg per tablet, , Disp: , Rfl:     phentermine (ADIPEX-P) 37.5 mg tablet, , Disp: , Rfl:     potassium chloride SA (K-DUR,KLOR-CON) 20 MEQ tablet, , Disp: , Rfl:     predniSONE (DELTASONE) 10 MG tablet, Take 10 mg by mouth as needed., Disp: , Rfl:     promethazine (PHENERGAN) 25 MG tablet, Take 25 mg by mouth as needed., Disp: , Rfl:     triamcinolone acetonide 0.025% (KENALOG) 0.025 % cream, Apply to AA on body BID PRN flares tapering with improvement, Disp: 80 g, Rfl: 6    VENTOLIN HFA 90 mcg/actuation inhaler, , Disp: , Rfl:     zafirlukast (ACCOLATE) 10 MG Tab, Take 10 mg by mouth once daily., Disp: , Rfl:     ROS: A focused review of systems was obtained and negative.     Exam: A full skin exam was performed including scalp, hair, face, neck, chest, back, abdomen, right arm, left arm, right hand, left hand, nails, right leg, and left leg.  All areas examined were normal expect as per below in assessment and plan.  General Appearance of the patient is well developed and well nourished.  Orientation: alert and oriented x 3.  Mood and affect: pleasant.    Assessment:   The primary encounter diagnosis was Other skin changes due to chronic exposure to nonionizing radiation. Diagnoses of SK (seborrheic keratosis), Neoplasm of uncertain behavior, Seborrheic dermatitis, and Family history of melanoma were also pertinent to this visit.    Plan:   Medications Ordered This Encounter   Medications    clobetasoL (TEMOVATE) 0.05 % external solution     Sig: Apply to scalp  BID tapering with improvement     Dispense:  50 mL     Refill:  5    ketoconazole (NIZORAL) 2 % shampoo      Sig: Use as a scalp treatment 2-3 times a week for maintenance, massaging into scalp and leaving on for up to 3 minutes before rinsing     Dispense:  120 mL     Refill:  4     Other Skin Changes Due to Chronic Exposure of Nonionizing Radiation (L57.8)    Plan: Monitoring.     Plan: Sunscreen Recommendations.  I recommended a broad spectrum sunscreen with a SPF of 30 or higher. I explained that SPF 30 sunscreens block approximately 97 percent of the  sun's harmful rays. Sunscreens should be applied at least 15 minutes prior to expected sun exposure and then every 2 hours after that as long as  sun exposure continues. If swimming or exercising sunscreen should be reapplied every 45 minutes to an hour after getting wet or sweating. One  ounce, or the equivalent of a shot glass full of sunscreen, is adequate to protect the skin not covered by a bathing suit. I also recommended a lip  balm with a sunscreen as well. Sun protective clothing can be used in lieu of sunscreen but must be worn the entire time you are exposed to the  sun's rays.    Seborrheic Keratosis (L82.1)  - Stuck-on, warty, greasy brown papule with pseudo-horn cysts scattered on the trunk and extremities    Plan: Counseling.  I counseled the patient regarding the following:  Skin Care: Seborrheic Keratoses are benign. No treatment is necessary.  Expectations: Seborrheic Keratoses are benign warty growths. Patients get more of them as they age    Plan: Reassurance    Seborrheic Dermatitis  - Pink/orange scaly plaques located on the scalp    Status: Inadequately controlled      Plan: Counseling.  I counseled the patient regarding the following:  Skin care: Emollients, shampoos with tar, selenium or zinc pyrithione can improve seborrheic dermatitis.  Expectations: Seborrheic Dermatitis is chronic in nature with periods of remissions and flares. Flares can be  triggered by stress.  Contact office if: Seborrheic dermatitis worsens, or fails to improve despite  several months of treatment.    Plan: Prescription     -begin Keto shampoo and clobetasol solution     Neoplasm of Uncertain Behavior (D48.5)  - pink smooth papule located on the right distal dorsal forearm  Ddx includes: PN vs DF r/o SCC    Plan: Counseling.  I counseled the patient regarding the following:  Instructions: Neoplasms of Uncertain Behavior can be observed, biopsied or surgically removed depending on the  level of clinical suspicion.  Instructions: Neoplasms of Uncertain Behavior can be observed, biopsied or surgically removed depending on the  level of clinical suspicion.  Contact Office if: patient develops any new lesions that fail to heal, ulcerate or bleed.    Plan: Biopsy by Shave Method.  Location (1): right distal dorsal forearm   Written consent was obtained and risks were reviewed including but not  limited to scarring, infection, bleeding, scabbing, incomplete removal, nerve damage and allergy to anesthesia.  The area was prepped with Chloraprep. Local anesthesia was obtained with approximately 0.5cc of 1% lidocaine  with epinephrine. A biopsy by shave method to the level of the dermis (sent for H and E) was performed using  a Dermablade on the above location. Aluminum chloride was used for hemostasis. Following the biopsy  Petrolatum and a bandage were applied. Patient will be notified of biopsy results. However, patient instructed to  call the office if not contacted within 2 weeks.    Family history of malignant melanoma  - no suspicious lesions noted    Plan: Counseling  I counseled the patient regarding the following:  Skin Care: Patients with a family history of melanoma should wear broad spectrum sunscreen and sun protective clothing.  Expectations: Patients with a family history of melanoma from a 1st degree relative have a higher risk of developing a melanoma compared with the rest of the population. Monthly self-skin checks should be performed to monitor for any moles that change  in size, shape or color, itch burn or bleed.  Contact Office if: Patient notices any new or changing moles.        Follow up in about 1 year (around 10/17/2025) for fse.    Sveta Dillon MD

## 2024-10-21 ENCOUNTER — OFFICE VISIT (OUTPATIENT)
Dept: GASTROENTEROLOGY | Facility: CLINIC | Age: 54
End: 2024-10-21
Payer: COMMERCIAL

## 2024-10-21 VITALS
HEART RATE: 91 BPM | DIASTOLIC BLOOD PRESSURE: 101 MMHG | OXYGEN SATURATION: 96 % | SYSTOLIC BLOOD PRESSURE: 142 MMHG | BODY MASS INDEX: 47.09 KG/M2 | HEIGHT: 66 IN | WEIGHT: 293 LBS

## 2024-10-21 DIAGNOSIS — K59.00 CONSTIPATION, UNSPECIFIED CONSTIPATION TYPE: Primary | ICD-10-CM

## 2024-10-21 PROCEDURE — 3008F BODY MASS INDEX DOCD: CPT | Mod: CPTII,,,

## 2024-10-21 PROCEDURE — 99215 OFFICE O/P EST HI 40 MIN: CPT | Mod: PBBFAC

## 2024-10-21 PROCEDURE — 3080F DIAST BP >= 90 MM HG: CPT | Mod: CPTII,,,

## 2024-10-21 PROCEDURE — 1159F MED LIST DOCD IN RCRD: CPT | Mod: CPTII,,,

## 2024-10-21 PROCEDURE — 1160F RVW MEDS BY RX/DR IN RCRD: CPT | Mod: CPTII,,,

## 2024-10-21 PROCEDURE — 3077F SYST BP >= 140 MM HG: CPT | Mod: CPTII,,,

## 2024-10-21 PROCEDURE — 99999 PR PBB SHADOW E&M-EST. PATIENT-LVL V: CPT | Mod: PBBFAC,,,

## 2024-10-21 PROCEDURE — 99214 OFFICE O/P EST MOD 30 MIN: CPT | Mod: S$PBB,,,

## 2024-10-21 NOTE — PROGRESS NOTES
Gastroenterology Clinic Note    Patient ID: [id number]   Referring MD: No ref. provider found   Chief Complaint:   Chief Complaint   Patient presents with    Follow-up     No problems       History of Present Illness   Marian Foster is an 54 y.o. WF who is referred for abdominal pain.  Patient reports chronic abdominal pain, nausea, and alternating constipation and diarrhea.  Describes pain as severe when present.  She has increased fiber in her diet without improvement.  She denies any hematochezia or melena.  She denies unintentional weight loss.  She does have a history of colon polyps as well as family history of CRC in her father.  She is due for screening colonoscopy.  Last colonoscopy with Dr. Lei.    Last colonoscopy was 06/24/2024 with 5 year recall for screening purposes.    Interval  - symptoms are improved with over-the-counter therapies for constipation  - no new or worsening GI related issues reported    Review of Systems   Constitutional:  Negative for weight loss.   Gastrointestinal:  Positive for constipation. Negative for abdominal pain, blood in stool, diarrhea, melena and nausea.       Past Medical History      Past Medical History:   Diagnosis Date    Arthritis     COPD (chronic obstructive pulmonary disease)     Disorder of kidney and ureter     Hypertension     Liver disease     Osteoarthritis     Seizures     Sleep apnea        Past Surgical History     Past Surgical History:   Procedure Laterality Date    ABDOMINAL SURGERY      x3    APPENDECTOMY      BREAST SURGERY      CHOLECYSTECTOMY      GANGLION CYST EXCISION Left     HYSTERECTOMY      KNEE ARTHROSCOPY      LAPAROSCOPY      lithrotripsy      MYRINGOTOMY W/ TUBES Left     OOPHORECTOMY      TONSILLECTOMY      TOTAL REDUCTION MAMMOPLASTY      TRIGGER FINGER RELEASE Right        Allergies     Review of patient's allergies indicates:   Allergen Reactions    Fluoxetine Other (See Comments)     Other reaction(s): Unknown  Suicidal    Other  "reaction(s): Unknown  Suicidal    Other reaction(s): Unknown Suicidal    Meperidine Other (See Comments)     Other reaction(s): Unknown  Seizures    Other reaction(s): Unknown  Seizures    Other reaction(s): Unknown Seizures    Tetracyclines Other (See Comments)     Other reaction(s): Unknown  Childhood unknown allergy    Other reaction(s): Unknown  Childhood unknown allergy    Other reaction(s): Unknown Childhood unknown allergy    Adhesive tape-silicones Rash    Erythromycin      Other reaction(s): Unknown  Other reaction(s): Unknown  Other reaction(s): Unknown    Celecoxib      Other reaction(s): Unknown  Other reaction(s): Unknown  Other reaction(s): Unknown    Cyclobenzaprine      depression    Adhesive Rash     Other reaction(s): Unknown  Note: plastic tape       Immunization History     There is no immunization history on file for this patient.    Past Family History      Family History   Problem Relation Name Age of Onset    Breast cancer Sister      Breast cancer Paternal Grandmother         Past Social History      Social History     Socioeconomic History    Marital status:    Tobacco Use    Smoking status: Former    Smokeless tobacco: Never   Substance and Sexual Activity    Alcohol use: Not Currently    Drug use: Not Currently       Current Medications     No outpatient medications have been marked as taking for the 10/21/24 encounter (Office Visit) with Nancy Santillan FNP.        I have reviewed the current medications, allergies, vital signs, past medical and surgical history, family medical history, and social history for this encounter and agree with all findings.    OBJECTIVE    Physical Exam    BP (!) 142/101   Pulse 91   Ht 5' 6" (1.676 m)   Wt (!) 149.3 kg (329 lb 3.2 oz)   SpO2 96%   BMI 53.13 kg/m²   GEN: Well appearing, cooperative, NAD.  Obese.  NECK: Supple, no LAD  CV: Normal rate  RESP: Unlabored  ABD: ND, no guarding  EXT: No clubbing, cyanosis, or edema  SKIN: Warm and " dry  NEURO: AAO x4.     LABS    CBC (with or without Differential):   Lab Results   Component Value Date    WBC 13.62 (H) 05/20/2022    HGB 11.2 (L) 05/20/2022    HCT 34.2 (L) 05/20/2022    MCV 88.8 05/20/2022    MCH 29.1 05/20/2022    MCHC 32.7 05/20/2022    RDW 13.3 05/20/2022     05/20/2022    MPV 10.3 05/20/2022    NEUTOPHILPCT 64.9 05/20/2022    DIFFTYPE Auto 05/20/2022     BMP/CMP:   Lab Results   Component Value Date     05/20/2022    K 3.9 05/20/2022     05/20/2022    CO2 25 05/20/2022    BUN 12 05/20/2022    CREATININE 0.71 05/20/2022     (H) 05/20/2022    CALCIUM 8.3 (L) 05/20/2022    ALBUMIN 3.3 (L) 05/20/2022    AST 27 05/20/2022    ALT 42 05/20/2022    ALKPHOS 98 05/20/2022        IMAGING  X-ray KUB 05/2024 with mild to moderate colonic stool consistent with constipation.    ASSESSMENT  Marian Foster is a 54 y.o. WF with history of hypertension who is referred for abdominal pain.    1. Constipation, unspecified constipation type             PLAN    - continue over-the-counter therapies for constipation as these are working to control symptoms  - return to GI clinic for follow-up as needed    There are no Patient Instructions on file for this visit.      No orders of the defined types were placed in this encounter.        The risks and benefits of my recommendations, as well as other treatment options were discussed with the patient today. All questions were answered.    30 minutes of total time spent on the encounter, which includes face to face time and non-face to face time preparing to see the patient (eg, review of tests), obtaining and/or reviewing separately obtained history, documenting clinical information in the electronic or other health record, Independently interpreting results (not separately reported) and communicating results to the patient/family/caregiver, or care coordination (not separately reported).        Nancy Santillan, ROBERTP/ACNP [facility]astro[facility]

## 2025-01-24 ENCOUNTER — TELEPHONE (OUTPATIENT)
Dept: PULMONOLOGY | Facility: CLINIC | Age: 55
End: 2025-01-24
Payer: MEDICARE

## 2025-01-24 NOTE — TELEPHONE ENCOUNTER
Spoke to Mi at  office voiced patient use to see  (Internal Med) many years ago. Patient requesting to be seen. Voiced  next available will be on 2/12/24 at 1250pm. Patient voiced that she was sick with pneumonia and was seen at the ED yesterday. Patient requested to be seen today. Advised  is finished with clinic for today. Patient goes on to say that she does not feel well and medication that was given in the ED is not working. Advised to return to ED if symptoms are worse. Patient aggressively voiced that she is not going back to the ED because they will not treat her and she does not want to be around all those sick people. Even offered to follow back up with PCP. Patient ignored suggestions and voiced she will just sit around and be sick. Patient repeated appt with  and voiced will keep. No other questions or concerns at this time.

## 2025-01-24 NOTE — TELEPHONE ENCOUNTER
----- Message from Mary sent at 1/24/2025 10:33 AM CST -----  Who Called: Marian Pearson    Caller is requesting assistance/information from provider's office.      Preferred Method of Contact: Phone Call  Patient's Preferred Phone Number on File: [phone]  Best Call Back Number, if different:  Additional Information: pt calling requesting to be seen by Dr. Mata. She was seeing Dr. Sutton, where Dr. Mata use to work, and she states that Dr. Sutton gave her paperwork. She states she had pneumonia as well. She tried going to [facility], and she states that she was treated badly, so she wanted to try requesting to see Dr. Mata

## 2025-01-25 ENCOUNTER — HOSPITAL ENCOUNTER (INPATIENT)
Facility: HOSPITAL | Age: 55
LOS: 1 days | Discharge: HOME | DRG: 177 | End: 2025-01-27
Attending: EMERGENCY MEDICINE | Admitting: FAMILY MEDICINE
Payer: MEDICARE

## 2025-01-25 DIAGNOSIS — R06.02 SHORTNESS OF BREATH: ICD-10-CM

## 2025-01-25 DIAGNOSIS — R07.9 CHEST PAIN: ICD-10-CM

## 2025-01-25 DIAGNOSIS — U07.1 PNEUMONIA DUE TO COVID-19 VIRUS: Primary | ICD-10-CM

## 2025-01-25 DIAGNOSIS — R07.9 ACUTE CHEST PAIN: ICD-10-CM

## 2025-01-25 DIAGNOSIS — J12.82 PNEUMONIA DUE TO COVID-19 VIRUS: Primary | ICD-10-CM

## 2025-01-25 PROBLEM — E66.01 MORBID OBESITY: Status: ACTIVE | Noted: 2025-01-25

## 2025-01-25 PROBLEM — D64.9 ANEMIA: Status: ACTIVE | Noted: 2025-01-25

## 2025-01-25 PROBLEM — J20.8 VIRAL BRONCHITIS: Status: ACTIVE | Noted: 2025-01-25

## 2025-01-25 PROBLEM — E87.6 HYPOKALEMIA: Status: ACTIVE | Noted: 2025-01-25

## 2025-01-25 PROBLEM — M17.0 PRIMARY OSTEOARTHRITIS OF BOTH KNEES: Chronic | Status: RESOLVED | Noted: 2021-10-04 | Resolved: 2025-01-25

## 2025-01-25 LAB
ALBUMIN SERPL BCP-MCNC: 3.2 G/DL (ref 3.5–5)
ALBUMIN/GLOB SERPL: 1 {RATIO}
ALP SERPL-CCNC: 98 U/L (ref 40–150)
ALT SERPL W P-5'-P-CCNC: 52 U/L
ANION GAP SERPL CALCULATED.3IONS-SCNC: 13 MMOL/L (ref 7–16)
AST SERPL W P-5'-P-CCNC: 29 U/L (ref 5–34)
BASOPHILS # BLD AUTO: 0.06 K/UL (ref 0–0.2)
BASOPHILS NFR BLD AUTO: 0.5 % (ref 0–1)
BILIRUB SERPL-MCNC: 0.4 MG/DL
BUN SERPL-MCNC: 14 MG/DL (ref 10–20)
BUN/CREAT SERPL: 19 (ref 6–20)
CALCIUM SERPL-MCNC: 9 MG/DL (ref 8.4–10.2)
CHLORIDE SERPL-SCNC: 104 MMOL/L (ref 98–107)
CO2 SERPL-SCNC: 29 MMOL/L (ref 22–29)
CREAT SERPL-MCNC: 0.73 MG/DL (ref 0.55–1.02)
CRP SERPL HS-MCNC: 85.66 MG/L
DIFFERENTIAL METHOD BLD: ABNORMAL
EGFR (NO RACE VARIABLE) (RUSH/TITUS): 98 ML/MIN/1.73M2
EOSINOPHIL # BLD AUTO: 0.04 K/UL (ref 0–0.5)
EOSINOPHIL NFR BLD AUTO: 0.3 % (ref 1–4)
ERYTHROCYTE [DISTWIDTH] IN BLOOD BY AUTOMATED COUNT: 13.2 % (ref 11.5–14.5)
GLOBULIN SER-MCNC: 3.3 G/DL (ref 2–4)
GLUCOSE SERPL-MCNC: 103 MG/DL (ref 74–100)
HCT VFR BLD AUTO: 35.4 % (ref 38–47)
HGB BLD-MCNC: 11.1 G/DL (ref 12–16)
IMM GRANULOCYTES # BLD AUTO: 0.15 K/UL (ref 0–0.04)
IMM GRANULOCYTES NFR BLD: 1.1 % (ref 0–0.4)
INFLUENZA A MOLECULAR (OHS): NEGATIVE
INFLUENZA B MOLECULAR (OHS): NEGATIVE
LYMPHOCYTES # BLD AUTO: 2.71 K/UL (ref 1–4.8)
LYMPHOCYTES NFR BLD AUTO: 20.6 % (ref 27–41)
MCH RBC QN AUTO: 30.5 PG (ref 27–31)
MCHC RBC AUTO-ENTMCNC: 31.4 G/DL (ref 32–36)
MCV RBC AUTO: 97.3 FL (ref 80–96)
MONOCYTES # BLD AUTO: 1.03 K/UL (ref 0–0.8)
MONOCYTES NFR BLD AUTO: 7.8 % (ref 2–6)
MPC BLD CALC-MCNC: 10.8 FL (ref 9.4–12.4)
NEUTROPHILS # BLD AUTO: 9.15 K/UL (ref 1.8–7.7)
NEUTROPHILS NFR BLD AUTO: 69.7 % (ref 53–65)
NRBC # BLD AUTO: 0 X10E3/UL
NRBC, AUTO (.00): 0 %
NT-PROBNP SERPL-MCNC: 292 PG/ML (ref 1–125)
PLATELET # BLD AUTO: 246 K/UL (ref 150–400)
POTASSIUM SERPL-SCNC: 2.9 MMOL/L (ref 3.5–5.1)
PROCALCITONIN SERPL-MCNC: 0.07 NG/ML
PROT SERPL-MCNC: 6.5 G/DL (ref 6.4–8.3)
RBC # BLD AUTO: 3.64 M/UL (ref 4.2–5.4)
SARS-COV-2 RDRP RESP QL NAA+PROBE: POSITIVE
SODIUM SERPL-SCNC: 143 MMOL/L (ref 136–145)
WBC # BLD AUTO: 13.14 K/UL (ref 4.5–11)

## 2025-01-25 PROCEDURE — G0378 HOSPITAL OBSERVATION PER HR: HCPCS

## 2025-01-25 PROCEDURE — 85025 COMPLETE CBC W/AUTO DIFF WBC: CPT | Performed by: EMERGENCY MEDICINE

## 2025-01-25 PROCEDURE — 84145 PROCALCITONIN (PCT): CPT

## 2025-01-25 PROCEDURE — 87502 INFLUENZA DNA AMP PROBE: CPT | Performed by: EMERGENCY MEDICINE

## 2025-01-25 PROCEDURE — 36415 COLL VENOUS BLD VENIPUNCTURE: CPT

## 2025-01-25 PROCEDURE — 36415 COLL VENOUS BLD VENIPUNCTURE: CPT | Performed by: EMERGENCY MEDICINE

## 2025-01-25 PROCEDURE — 25000003 PHARM REV CODE 250

## 2025-01-25 PROCEDURE — 93005 ELECTROCARDIOGRAM TRACING: CPT

## 2025-01-25 PROCEDURE — 63600175 PHARM REV CODE 636 W HCPCS: Mod: JZ,TB

## 2025-01-25 PROCEDURE — 87635 SARS-COV-2 COVID-19 AMP PRB: CPT | Performed by: EMERGENCY MEDICINE

## 2025-01-25 PROCEDURE — 99285 EMERGENCY DEPT VISIT HI MDM: CPT | Mod: 25

## 2025-01-25 PROCEDURE — XW033E5 INTRODUCTION OF REMDESIVIR ANTI-INFECTIVE INTO PERIPHERAL VEIN, PERCUTANEOUS APPROACH, NEW TECHNOLOGY GROUP 5: ICD-10-PCS | Performed by: FAMILY MEDICINE

## 2025-01-25 PROCEDURE — 83880 ASSAY OF NATRIURETIC PEPTIDE: CPT | Performed by: EMERGENCY MEDICINE

## 2025-01-25 PROCEDURE — 99223 1ST HOSP IP/OBS HIGH 75: CPT | Mod: AI,GC,, | Performed by: FAMILY MEDICINE

## 2025-01-25 PROCEDURE — 25500020 PHARM REV CODE 255: Performed by: EMERGENCY MEDICINE

## 2025-01-25 PROCEDURE — 96372 THER/PROPH/DIAG INJ SC/IM: CPT | Mod: 59 | Performed by: EMERGENCY MEDICINE

## 2025-01-25 PROCEDURE — 86141 C-REACTIVE PROTEIN HS: CPT

## 2025-01-25 PROCEDURE — 80053 COMPREHEN METABOLIC PANEL: CPT | Performed by: EMERGENCY MEDICINE

## 2025-01-25 PROCEDURE — 96365 THER/PROPH/DIAG IV INF INIT: CPT

## 2025-01-25 PROCEDURE — 93010 ELECTROCARDIOGRAM REPORT: CPT | Mod: ,,, | Performed by: INTERNAL MEDICINE

## 2025-01-25 PROCEDURE — 87040 BLOOD CULTURE FOR BACTERIA: CPT | Performed by: EMERGENCY MEDICINE

## 2025-01-25 PROCEDURE — 25000242 PHARM REV CODE 250 ALT 637 W/ HCPCS: Performed by: EMERGENCY MEDICINE

## 2025-01-25 PROCEDURE — 63600175 PHARM REV CODE 636 W HCPCS: Mod: JZ,TB | Performed by: EMERGENCY MEDICINE

## 2025-01-25 RX ORDER — CARBAMAZEPINE 100 MG/1
500 TABLET, EXTENDED RELEASE ORAL 3 TIMES DAILY
Status: DISCONTINUED | OUTPATIENT
Start: 2025-01-25 | End: 2025-01-25

## 2025-01-25 RX ORDER — ENOXAPARIN SODIUM 100 MG/ML
40 INJECTION SUBCUTANEOUS EVERY 12 HOURS
Status: DISCONTINUED | OUTPATIENT
Start: 2025-01-25 | End: 2025-01-27 | Stop reason: HOSPADM

## 2025-01-25 RX ORDER — POTASSIUM CHLORIDE 20 MEQ/1
20 TABLET, EXTENDED RELEASE ORAL DAILY
Status: DISCONTINUED | OUTPATIENT
Start: 2025-01-26 | End: 2025-01-25

## 2025-01-25 RX ORDER — TALC
6 POWDER (GRAM) TOPICAL NIGHTLY PRN
Status: DISCONTINUED | OUTPATIENT
Start: 2025-01-25 | End: 2025-01-27 | Stop reason: HOSPADM

## 2025-01-25 RX ORDER — BACLOFEN 10 MG/1
10 TABLET ORAL 3 TIMES DAILY
COMMUNITY
Start: 2025-01-03

## 2025-01-25 RX ORDER — CARBAMAZEPINE 100 MG/1
500 TABLET, EXTENDED RELEASE ORAL 3 TIMES DAILY
Status: DISCONTINUED | OUTPATIENT
Start: 2025-01-25 | End: 2025-01-26

## 2025-01-25 RX ORDER — ACETAMINOPHEN 325 MG/1
650 TABLET ORAL EVERY 8 HOURS PRN
Status: DISCONTINUED | OUTPATIENT
Start: 2025-01-25 | End: 2025-01-27 | Stop reason: HOSPADM

## 2025-01-25 RX ORDER — SIMETHICONE 80 MG
1 TABLET,CHEWABLE ORAL 4 TIMES DAILY PRN
Status: DISCONTINUED | OUTPATIENT
Start: 2025-01-25 | End: 2025-01-27 | Stop reason: HOSPADM

## 2025-01-25 RX ORDER — ETANERCEPT 50 MG/ML
50 SOLUTION SUBCUTANEOUS
COMMUNITY
Start: 2025-01-02

## 2025-01-25 RX ORDER — ONDANSETRON HYDROCHLORIDE 2 MG/ML
4 INJECTION, SOLUTION INTRAVENOUS EVERY 8 HOURS PRN
Status: DISCONTINUED | OUTPATIENT
Start: 2025-01-25 | End: 2025-01-27 | Stop reason: HOSPADM

## 2025-01-25 RX ORDER — GLUCAGON 1 MG
1 KIT INJECTION
Status: DISCONTINUED | OUTPATIENT
Start: 2025-01-25 | End: 2025-01-27 | Stop reason: HOSPADM

## 2025-01-25 RX ORDER — POTASSIUM CHLORIDE 20 MEQ/1
20 TABLET, EXTENDED RELEASE ORAL DAILY
Status: DISCONTINUED | OUTPATIENT
Start: 2025-01-25 | End: 2025-01-27 | Stop reason: HOSPADM

## 2025-01-25 RX ORDER — METHYLPREDNISOLONE SOD SUCC 125 MG
125 VIAL (EA) INJECTION
Status: DISCONTINUED | OUTPATIENT
Start: 2025-01-25 | End: 2025-01-25

## 2025-01-25 RX ORDER — IBUPROFEN 200 MG
16 TABLET ORAL
Status: DISCONTINUED | OUTPATIENT
Start: 2025-01-25 | End: 2025-01-27 | Stop reason: HOSPADM

## 2025-01-25 RX ORDER — SODIUM CHLORIDE 0.9 % (FLUSH) 0.9 %
10 SYRINGE (ML) INJECTION EVERY 12 HOURS PRN
Status: DISCONTINUED | OUTPATIENT
Start: 2025-01-25 | End: 2025-01-27 | Stop reason: HOSPADM

## 2025-01-25 RX ORDER — HYDROCODONE BITARTRATE AND ACETAMINOPHEN 10; 325 MG/1; MG/1
1 TABLET ORAL EVERY 6 HOURS PRN
Status: DISCONTINUED | OUTPATIENT
Start: 2025-01-25 | End: 2025-01-27 | Stop reason: HOSPADM

## 2025-01-25 RX ORDER — LEVOFLOXACIN 750 MG/1
750 TABLET ORAL DAILY
Status: DISCONTINUED | OUTPATIENT
Start: 2025-01-26 | End: 2025-01-27 | Stop reason: HOSPADM

## 2025-01-25 RX ORDER — IOPAMIDOL 755 MG/ML
100 INJECTION, SOLUTION INTRAVASCULAR
Status: COMPLETED | OUTPATIENT
Start: 2025-01-25 | End: 2025-01-25

## 2025-01-25 RX ORDER — IPRATROPIUM BROMIDE AND ALBUTEROL SULFATE 2.5; .5 MG/3ML; MG/3ML
3 SOLUTION RESPIRATORY (INHALATION)
Status: DISCONTINUED | OUTPATIENT
Start: 2025-01-25 | End: 2025-01-25

## 2025-01-25 RX ORDER — IBUPROFEN 200 MG
24 TABLET ORAL
Status: DISCONTINUED | OUTPATIENT
Start: 2025-01-25 | End: 2025-01-27 | Stop reason: HOSPADM

## 2025-01-25 RX ORDER — POLYETHYLENE GLYCOL 3350 17 G/17G
17 POWDER, FOR SOLUTION ORAL DAILY PRN
Status: DISCONTINUED | OUTPATIENT
Start: 2025-01-25 | End: 2025-01-27 | Stop reason: HOSPADM

## 2025-01-25 RX ORDER — IPRATROPIUM BROMIDE AND ALBUTEROL SULFATE 2.5; .5 MG/3ML; MG/3ML
3 SOLUTION RESPIRATORY (INHALATION)
Status: COMPLETED | OUTPATIENT
Start: 2025-01-25 | End: 2025-01-25

## 2025-01-25 RX ORDER — METHYLPREDNISOLONE SOD SUCC 125 MG
125 VIAL (EA) INJECTION
Status: COMPLETED | OUTPATIENT
Start: 2025-01-25 | End: 2025-01-25

## 2025-01-25 RX ORDER — ESCITALOPRAM OXALATE 10 MG/1
20 TABLET ORAL DAILY
Status: DISCONTINUED | OUTPATIENT
Start: 2025-01-26 | End: 2025-01-27 | Stop reason: HOSPADM

## 2025-01-25 RX ORDER — ACETAMINOPHEN 325 MG/1
650 TABLET ORAL EVERY 4 HOURS PRN
Status: DISCONTINUED | OUTPATIENT
Start: 2025-01-25 | End: 2025-01-27 | Stop reason: HOSPADM

## 2025-01-25 RX ORDER — NALOXONE HCL 0.4 MG/ML
0.02 VIAL (ML) INJECTION
Status: DISCONTINUED | OUTPATIENT
Start: 2025-01-25 | End: 2025-01-27 | Stop reason: HOSPADM

## 2025-01-25 RX ORDER — MONTELUKAST SODIUM 10 MG/1
10 TABLET ORAL DAILY
Status: DISCONTINUED | OUTPATIENT
Start: 2025-01-26 | End: 2025-01-27 | Stop reason: HOSPADM

## 2025-01-25 RX ORDER — PROCHLORPERAZINE EDISYLATE 5 MG/ML
5 INJECTION INTRAMUSCULAR; INTRAVENOUS EVERY 6 HOURS PRN
Status: DISCONTINUED | OUTPATIENT
Start: 2025-01-25 | End: 2025-01-27 | Stop reason: HOSPADM

## 2025-01-25 RX ADMIN — IOPAMIDOL 100 ML: 755 INJECTION, SOLUTION INTRAVENOUS at 04:01

## 2025-01-25 RX ADMIN — REMDESIVIR 200 MG: 100 INJECTION, POWDER, LYOPHILIZED, FOR SOLUTION INTRAVENOUS at 06:01

## 2025-01-25 RX ADMIN — POTASSIUM CHLORIDE 20 MEQ: 1500 TABLET, EXTENDED RELEASE ORAL at 06:01

## 2025-01-25 RX ADMIN — CARBAMAZEPINE 500 MG: 100 TABLET, EXTENDED RELEASE ORAL at 06:01

## 2025-01-25 RX ADMIN — IPRATROPIUM BROMIDE AND ALBUTEROL SULFATE 3 ML: .5; 3 SOLUTION RESPIRATORY (INHALATION) at 05:01

## 2025-01-25 RX ADMIN — METHYLPREDNISOLONE SODIUM SUCCINATE 125 MG: 125 INJECTION, POWDER, FOR SOLUTION INTRAMUSCULAR; INTRAVENOUS at 05:01

## 2025-01-25 NOTE — ASSESSMENT & PLAN NOTE
Body mass index is 53.42 kg/m².   Morbid obesity complicates all aspects of disease management from diagnostic modalities to treatment.   Weight loss encouraged and health benefits explained to patient.

## 2025-01-25 NOTE — ASSESSMENT & PLAN NOTE
Patient is identified as High risk for severe complications of COVID 19 based on COVID risk score of 2   Initiate standard COVID protocols; COVID-19 testing ,Infection Control notification  and isolation- respiratory, contact and droplet per protocol    Diagnostics: CBC, CMP, CRP, and Portable CXR, Pro calcitonin and CT chest    Management: Initiate targeted therapy with Remdesivir, 200mg IV x1, followed by 100mg IV daily x3 days total.   Maintain oxygen saturations 92-96% via Nasal Cannula  LPM and monitor with continuous/intermittent pulse oximetry. , and Inhaled bronchodilators as needed for shortness of breath.    Advance Care Planning  Current advance care plan has been discussed with patient and patient currently wishes Full Code.

## 2025-01-25 NOTE — ED PROVIDER NOTES
Encounter Date: 1/25/2025       History     Chief Complaint   Patient presents with    Pneumonia     Patient complains of shortness breath and cough for the past 6 weeks.  She has seen by her primary care multiple times in his tried multiple rounds of antibiotics.  She has had Rocephin shots and Decadron shots x3.  She has been treated with Z-Chandra doxycycline and Augmentin.  Currently she is on Levaquin as of the past 2 days.  Her primary care doctor had wanted her to be seen at the hospital for consideration of the admission.  She had went to [facility] and was upset by the triage nurse.  Patient does have history of COPD says she had smoking many years ago and used to follow up with pulmonology more than several years ago.  She does not have a functional nebulizer at home.  Patient says her physician told her that chest x-ray done recently showed possible pneumonia      Review of patient's allergies indicates:   Allergen Reactions    Fluoxetine Other (See Comments)     Other reaction(s): Unknown  Suicidal    Other reaction(s): Unknown  Suicidal    Other reaction(s): Unknown Suicidal    Meperidine Other (See Comments)     Other reaction(s): Unknown  Seizures    Other reaction(s): Unknown  Seizures    Other reaction(s): Unknown Seizures    Tetracyclines Other (See Comments)     Other reaction(s): Unknown  Childhood unknown allergy    Other reaction(s): Unknown  Childhood unknown allergy    Other reaction(s): Unknown Childhood unknown allergy    Adhesive tape-silicones Rash    Erythromycin      Other reaction(s): Unknown  Other reaction(s): Unknown  Other reaction(s): Unknown    Celecoxib      Other reaction(s): Unknown  Other reaction(s): Unknown  Other reaction(s): Unknown    Cyclobenzaprine      depression    Adhesive Rash     Other reaction(s): Unknown  Note: plastic tape     Past Medical History:   Diagnosis Date    Arthritis     COPD (chronic obstructive pulmonary disease)     Disorder of kidney and  ureter     Hypertension     Liver disease     Osteoarthritis     Seizures     Sleep apnea      Past Surgical History:   Procedure Laterality Date    ABDOMINAL SURGERY      x3    APPENDECTOMY      BREAST SURGERY      CHOLECYSTECTOMY      GANGLION CYST EXCISION Left     HYSTERECTOMY      KNEE ARTHROSCOPY      LAPAROSCOPY      lithrotripsy      MYRINGOTOMY W/ TUBES Left     OOPHORECTOMY      TONSILLECTOMY      TOTAL REDUCTION MAMMOPLASTY      TRIGGER FINGER RELEASE Right      Family History   Problem Relation Name Age of Onset    Breast cancer Sister      Breast cancer Paternal Grandmother       Social History     Tobacco Use    Smoking status: Former    Smokeless tobacco: Never   Substance Use Topics    Alcohol use: Not Currently    Drug use: Not Currently     Review of Systems   Constitutional:  Negative for fever.   HENT:  Positive for congestion. Negative for sore throat.    Respiratory:  Positive for cough and shortness of breath.    Cardiovascular:  Negative for chest pain.   Gastrointestinal:  Negative for nausea.   Genitourinary:  Negative for dysuria.   Musculoskeletal:  Negative for back pain.   Skin:  Negative for rash.   Neurological:  Negative for weakness.   Hematological:  Does not bruise/bleed easily.       Physical Exam     Initial Vitals [01/25/25 0427]   BP Pulse Resp Temp SpO2   (!) 188/94 76 18 97.3 °F (36.3 °C) 95 %      MAP       --         Physical Exam    Nursing note and vitals reviewed.  Constitutional: She appears well-developed and well-nourished.   HENT:   Head: Normocephalic and atraumatic.   Eyes: EOM are normal. Pupils are equal, round, and reactive to light.   Neck: Neck supple. No thyromegaly present.   Normal range of motion.  Cardiovascular:  Normal rate, regular rhythm, normal heart sounds and intact distal pulses.           No murmur heard.  Pulmonary/Chest: Breath sounds normal. No respiratory distress. She has no wheezes.   Abdominal: Abdomen is soft. Bowel sounds are normal.  She exhibits no distension. There is no abdominal tenderness.   Musculoskeletal:         General: No tenderness or edema. Normal range of motion.      Cervical back: Normal range of motion and neck supple.     Lymphadenopathy:     She has no cervical adenopathy.   Neurological: She is alert and oriented to person, place, and time. She has normal strength. No cranial nerve deficit or sensory deficit.   Skin: Skin is warm and dry. No rash noted.   Psychiatric: She has a normal mood and affect.         Medical Screening Exam   See Full Note    ED Course   Procedures  Labs Reviewed   COMPREHENSIVE METABOLIC PANEL - Abnormal       Result Value    Sodium 143      Potassium 2.9 (*)     Chloride 104      CO2 29      Anion Gap 13      Glucose 103 (*)     BUN 14      Creatinine 0.73      BUN/Creatinine Ratio 19      Calcium 9.0      Total Protein 6.5      Albumin 3.2 (*)     Globulin 3.3      A/G Ratio 1.0      Bilirubin, Total 0.4      Alk Phos 98      ALT 52      AST 29      eGFR 98     NT-PRO NATRIURETIC PEPTIDE - Abnormal    ProBNP 292 (*)    SARS-COV-2 RNA AMPLIFICATION, QUAL - Abnormal    SARS COV-2 Molecular Positive (*)    CBC WITH DIFFERENTIAL - Abnormal    WBC 13.14 (*)     RBC 3.64 (*)     Hemoglobin 11.1 (*)     Hematocrit 35.4 (*)     MCV 97.3 (*)     MCH 30.5      MCHC 31.4 (*)     RDW 13.2      Platelet Count 246      MPV 10.8      Neutrophils % 69.7 (*)     Lymphocytes % 20.6 (*)     Monocytes % 7.8 (*)     Eosinophils % 0.3 (*)     Basophils % 0.5      Immature Granulocytes % 1.1 (*)     nRBC, Auto 0.0      Neutrophils, Abs 9.15 (*)     Lymphocytes, Absolute 2.71      Monocytes, Absolute 1.03 (*)     Eosinophils, Absolute 0.04      Basophils, Absolute 0.06      Immature Granulocytes, Absolute 0.15 (*)     nRBC, Absolute 0.00      Diff Type Auto     HIGH SENSITIVITY CRP - Abnormal    CRP, High Senstivity 85.66 (*)    INFLUENZA A & B BY MOLECULAR - Normal    INFLUENZA A MOLECULAR Negative      INFLUENZA B  MOLECULAR  Negative     PROCALCITONIN - Normal    Procalcitonin 0.07     CBC W/ AUTO DIFFERENTIAL    Narrative:     The following orders were created for panel order CBC auto differential.  Procedure                               Abnormality         Status                     ---------                               -----------         ------                     CBC with Differential[[id number]]       Abnormal            Final result                 Please view results for these tests on the individual orders.        ECG Results    None       Imaging Results               CTA Chest Non-Coronary (PE Studies) (Final result)  Result time 01/25/25 17:09:15      Final result by Jonathan Nicholas MD (01/25/25 17:09:15)                   Impression:      1. No evidence of pulmonary embolism.  Limited evaluation.  See above comments.  2. Bilateral mild lower lobe patchy alveolar infiltrate, right greater than left suggesting mild pneumonitis.  Mild infiltrate in the right upper lobe also.  Follow-up recommended.  3. Splenomegaly.  4. Hepatic steatosis.  5. Small hiatal hernia.  6. This report was flagged in Epic as abnormal.      Electronically signed by: Jonathan Nicholas  Date:    01/25/2025  Time:    17:09               Narrative:    EXAMINATION:  CTA CHEST NON CORONARY (PE STUDIES)    CLINICAL HISTORY:  Pulmonary embolism (PE) suspected, unknown D-dimer;COVID; Chest pain, unspecified    TECHNIQUE:  Low dose axial images, sagittal and coronal reformations were obtained from the thoracic inlet to the lung bases following the IV administration of 100 mL of Omnipaque 350.  Contrast timing was optimized to evaluate the pulmonary arteries.  MIP images were performed.    COMPARISON:  None    FINDINGS:  Pulmonary arteries:Pulmonary arteries are suboptimally enhanced.No large PE in the main pulmonary arteries.  Evaluation of segmental and subsegmental pulmonary arteries is nondiagnostic with limited enhancement.  Recommend follow-up  if there is high clinical suspicion.    Thoracic soft tissues: Unremarkable.    Aorta: Left-sided aortic arch.  No aneurysm or dissection.    Heart: Normal size.  Trace pericardial effusion.    Sofia/Mediastinum: No pathologic randal enlargement.    Airways: Patent.    Lungs/Pleura: Clear lungs. No pleural effusion or thickening.    Esophagus: Bilateral mild lower lobe patchy alveolar infiltrate right greater than left suggesting mild pneumonitis.  Mild infiltrate in the right upper lobe also.    No mass or consolidation.  No effusion or pneumothorax.    Upper Abdomen: Spleen is enlarged.  Fatty infiltration of the liver.    Small hiatal hernia.    Bones: No acute fracture. No suspicious lytic or sclerotic lesions.                                       X-Ray Chest PA And Lateral (Final result)  Result time 01/25/25 08:25:56      Final result by Leeroy Ruano MD (01/25/25 08:25:56)                   Impression:      There is no focal consolidation or evidence of gross congestive failure.  There does appear to be prominence of the interstitium diffusely but these findings were present previously and may be exaggerated due to technique and patient body habitus.  Some degree of underlying edema is not excluded.      Electronically signed by: Leeroy Ruano MD  Date:    01/25/2025  Time:    08:25               Narrative:    EXAMINATION:  XR CHEST PA AND LATERAL    CLINICAL HISTORY:  Shortness of breath    TECHNIQUE:  PA and lateral views of the chest were performed.    COMPARISON:  Portable erect AP chest x-ray dated 01/29/2016    FINDINGS:  There is no recent study for comparison.  There is no focal consolidation or mass.  Heart size at the upper limits of normal.  There is prominence of the interstitium diffusely which may be exaggerated due to patient body habitus and technique.  Some degree of interstitial edema is not excluded.  There is no gross congestive failure.  Cardiac monitoring leads overlie the  chest.                                       Medications   albuterol-ipratropium 2.5 mg-0.5 mg/3 mL nebulizer solution 3 mL (3 mLs Nebulization Given 1/25/25 0550)   methylPREDNISolone sodium succinate injection 125 mg (125 mg Intramuscular Given 1/25/25 0550)   remdesivir 200 mg in 0.9% NaCl 250 mL infusion (0 mg Intravenous Stopped 1/25/25 1840)     Followed by   remdesivir 100 mg in 0.9% NaCl 250 mL infusion (0 mg Intravenous Stopped 1/27/25 0930)   iopamidoL (ISOVUE-370) injection 100 mL (100 mLs Intravenous Given 1/25/25 1633)     Medical Decision Making  Amount and/or Complexity of Data Reviewed  Labs: ordered.  Radiology: ordered.    Risk  Prescription drug management.               ED Course as of 01/27/25 1845   Sat Jan 25, 2025   0648 Care transferred to Dr. Sotelo [PK]      ED Course User Index  [PK] Bijan Arias MD                           Clinical Impression:   Final diagnoses:  [R06.02] Shortness of breath        ED Disposition Condition    Observation                 Bijan Arias MD  01/27/25 1845

## 2025-01-25 NOTE — HPI
Patient complains of shortness breath, chest discomfort and cough for the past 6 weeks which started after exposure to her granddaughter who had flu symptoms at that time. The symptoms progressively worsened which led her to see her primary care multiple times She has had Rocephin shots and Decadron shots x3. She has been treated with Z-Chandra doxycycline and Augmentin. Currently she is on Levaquin as in the past 2 days. Her primary care doctor referred her to the hospital for consideration of the admission.   ED presenting vitals /94, HR 76, RR 18, 02 sat 95 percent  ED initial labs Covid positive, WBC 13, Hb 11, Plat 246, Na 243, K 2.9, BUN 14, Creat 0.73  Chest X-ray:no focal consolidation or evidence of gross congestive failure. There does appear to be prominence of the interstitium diffusely. CT chest PE studies awaited   Patient is admitted to the observation unit for further management and care.

## 2025-01-25 NOTE — ASSESSMENT & PLAN NOTE
History of sleep apnea, likely due to morbid obesity  Patient was recommended home CPAP  Not using currently since she does not have the machine at home  Continue CPAP at hospital during naps/ nightly

## 2025-01-25 NOTE — ED TRIAGE NOTES
"Pt in with cc of pneumonia, pt was seen at Dr. claudio office today and referred to [facility]  ER for admission, pt left AMA because she said they treated her "like a dog" so she came to us   "

## 2025-01-25 NOTE — ASSESSMENT & PLAN NOTE
Viral bronchitis is a differential given her history of Cough and shortness of breath since past 6 weeks. Negative Covid status in these past few weeks as per patient. Covid positive today  Has completed course of Z-Chandra, doxycycline and Augmentin, Rocephin shots, Medrol dose pack. Currently on Levofloxacin Day 2  Viral bronchitis likely secondary to recent upper respiratory viral infection  Suggestive symptoms: cough(non-productive), chest discomfort, fatigue  No high grade fever or purulent sputum  Conservative treatment for now  Duo-nebs inhalation treatment, adequate hydration and monitor to prevent secondary bacterial infection

## 2025-01-25 NOTE — SUBJECTIVE & OBJECTIVE
Past Medical History:   Diagnosis Date    Arthritis     COPD (chronic obstructive pulmonary disease)     Disorder of kidney and ureter     Hypertension     Liver disease     Osteoarthritis     Seizures     Sleep apnea        Past Surgical History:   Procedure Laterality Date    ABDOMINAL SURGERY      x3    APPENDECTOMY      BREAST SURGERY      CHOLECYSTECTOMY      GANGLION CYST EXCISION Left     HYSTERECTOMY      KNEE ARTHROSCOPY      LAPAROSCOPY      lithrotripsy      MYRINGOTOMY W/ TUBES Left     OOPHORECTOMY      TONSILLECTOMY      TOTAL REDUCTION MAMMOPLASTY      TRIGGER FINGER RELEASE Right        Review of patient's allergies indicates:   Allergen Reactions    Fluoxetine Other (See Comments)     Other reaction(s): Unknown  Suicidal    Other reaction(s): Unknown  Suicidal    Other reaction(s): Unknown Suicidal    Meperidine Other (See Comments)     Other reaction(s): Unknown  Seizures    Other reaction(s): Unknown  Seizures    Other reaction(s): Unknown Seizures    Tetracyclines Other (See Comments)     Other reaction(s): Unknown  Childhood unknown allergy    Other reaction(s): Unknown  Childhood unknown allergy    Other reaction(s): Unknown Childhood unknown allergy    Adhesive tape-silicones Rash    Erythromycin      Other reaction(s): Unknown  Other reaction(s): Unknown  Other reaction(s): Unknown    Celecoxib      Other reaction(s): Unknown  Other reaction(s): Unknown  Other reaction(s): Unknown    Cyclobenzaprine      depression    Adhesive Rash     Other reaction(s): Unknown  Note: plastic tape       No current facility-administered medications on file prior to encounter.     Current Outpatient Medications on File Prior to Encounter   Medication Sig    ENBREL SURECLICK 50 mg/mL (1 mL) PnIj Inject 50 mg into the skin every 7 days.    carBAMazepine (TEGRETOL XR) 100 MG 12 hr tablet Take 5 tablets (500 mg total) by mouth 3 (three) times daily.    cholecalciferol, vitamin D3, 1,250 mcg (50,000 unit) capsule  Vitamin D3 1.25 MG (39487 UT) Oral Capsule QTY: 12 capsule Days: 30 Refills: 3  Written: 21 Patient Instructions: 1 capsule weekly x 12 weeks    clobetasoL (TEMOVATE) 0.05 % external solution Apply to scalp  BID tapering with improvement    clotrimazole-betamethasone 1-0.05% (LOTRISONE) cream     cyanocobalamin (VITAMIN B-12) 1000 MCG tablet Take 2,000 mcg by mouth once daily.    dicyclomine (BENTYL) 10 MG capsule Take 1 capsule (10 mg total) by mouth 3 (three) times daily as needed (abdominal pain).    ergocalciferol (ERGOCALCIFEROL) 50,000 unit Cap Take 50,000 Units by mouth every 7 days.    EScitalopram oxalate (LEXAPRO) 20 MG tablet Take 20 mg by mouth once daily.    febuxostat (ULORIC) 40 mg Tab Take 40 mg by mouth once daily.    fluconazole (DIFLUCAN) 100 MG tablet Take 100 mg by mouth as needed.    furosemide (LASIX) 20 MG tablet Take 20 mg by mouth as needed.    ibuprofen (ADVIL,MOTRIN) 800 MG tablet Take 800 mg by mouth every 6 (six) hours as needed for Pain.    ketoconazole (NIZORAL) 2 % shampoo Use as a scalp treatment 2-3 times a week for maintenance, massaging into scalp and leaving on for up to 3 minutes before rinsing    magnesium 200 mg Tab Magnesium 400 MG Oral Tablet QTY: 30 tablet Days: 30 Refills: 5  Written: 10/17/22 Patient Instructions: Take one po qd    mupirocin (BACTROBAN) 2 % ointment SMARTSI Application Topical 2-3 Times Daily    oxyCODONE-acetaminophen (PERCOCET)  mg per tablet     phentermine (ADIPEX-P) 37.5 mg tablet     potassium chloride SA (K-DUR,KLOR-CON) 20 MEQ tablet     predniSONE (DELTASONE) 10 MG tablet Take 10 mg by mouth as needed.    promethazine (PHENERGAN) 25 MG tablet Take 25 mg by mouth as needed.    triamcinolone acetonide 0.025% (KENALOG) 0.025 % cream Apply to AA on body BID PRN flares tapering with improvement    VENTOLIN HFA 90 mcg/actuation inhaler     zafirlukast (ACCOLATE) 10 MG Tab Take 10 mg by mouth once daily.    [DISCONTINUED] etanercept  (ENBREL) 25 mg (1 mL) SolR injection Inject 25 mg into the skin once a week.    [DISCONTINUED] nabumetone (RELAFEN) 500 MG tablet Take 500 mg by mouth once daily. (Patient not taking: Reported on 10/21/2024)     Family History       Problem Relation (Age of Onset)    Breast cancer Sister, Paternal Grandmother          Tobacco Use    Smoking status: Former    Smokeless tobacco: Never   Substance and Sexual Activity    Alcohol use: Not Currently    Drug use: Not Currently    Sexual activity: Not on file     Review of Systems   HENT:  Negative for sinus pain and sore throat.    Respiratory:  Positive for cough and shortness of breath.    Cardiovascular:  Positive for chest pain.   Gastrointestinal:  Negative for abdominal pain.   Musculoskeletal:  Positive for arthralgias.   Neurological:  Positive for weakness. Negative for syncope.   Psychiatric/Behavioral:  Negative for agitation and behavioral problems.      Objective:     Vital Signs (Most Recent):  Temp: 97.3 °F (36.3 °C) (01/25/25 0427)  Pulse: 77 (01/25/25 1527)  Resp: 13 (01/25/25 0550)  BP: (!) 177/98 (01/25/25 1523)  SpO2: 95 % (01/25/25 1527) Vital Signs (24h Range):  Temp:  [97.3 °F (36.3 °C)] 97.3 °F (36.3 °C)  Pulse:  [64-83] 77  Resp:  [13-21] 13  SpO2:  [92 %-97 %] 95 %  BP: (145-188)/(57-98) 177/98     Weight: (!) 150.1 kg (331 lb)  Body mass index is 53.42 kg/m².     Physical Exam  Constitutional:       Appearance: She is obese.   Cardiovascular:      Rate and Rhythm: Normal rate and regular rhythm.      Pulses: Normal pulses.      Heart sounds: Normal heart sounds.   Pulmonary:      Effort: Pulmonary effort is normal.      Breath sounds: No wheezing or rales.   Abdominal:      Palpations: Abdomen is soft.   Musculoskeletal:         General: Normal range of motion.      Cervical back: Neck supple.   Skin:     General: Skin is warm.      Capillary Refill: Capillary refill takes less than 2 seconds.   Neurological:      General: No focal deficit  present.      Mental Status: She is alert and oriented to person, place, and time.   Psychiatric:         Mood and Affect: Mood normal.                Significant Labs: All pertinent labs within the past 24 hours have been reviewed.    Significant Imaging: I have reviewed all pertinent imaging results/findings within the past 24 hours.

## 2025-01-25 NOTE — ASSESSMENT & PLAN NOTE
Patient's most recent potassium results are listed below.   Recent Labs     01/25/25  0519   K 2.9*     Plan  - Replete potassium per protocol.   - Monitor potassium Daily  - Patient's hypokalemia is stable  -Oral KCL is supplemented. Will adjust dose as needed

## 2025-01-25 NOTE — H&P
[facility]  [facility]  History & Physical    Patient Name: Marian Foster  MRN: [id number]  Patient Class: OP- Observation  Admission Date: 1/25/2025  Attending Physician: Jose Martin Long Jr., MD   Primary Care Provider: Diallo Sutton II, MD         Patient information was obtained from patient and ER records.     Subjective:     Principal Problem:<principal problem not specified>    Chief Complaint:   Chief Complaint   Patient presents with    Pneumonia        HPI: Patient complains of shortness breath, chest discomfort and cough for the past 6 weeks which started after exposure to her granddaughter who had flu symptoms at that time. The symptoms progressively worsened which led her to see her primary care multiple times She has had Rocephin shots and Decadron shots x3. She has been treated with Z-Chandra doxycycline and Augmentin. Currently she is on Levaquin as in the past 2 days. Her primary care doctor referred her to the hospital for consideration of the admission.   ED presenting vitals /94, HR 76, RR 18, 02 sat 95 percent  ED initial labs Covid positive, WBC 13, Hb 11, Plat 246, Na 243, K 2.9, BUN 14, Creat 0.73  Chest X-ray:no focal consolidation or evidence of gross congestive failure. There does appear to be prominence of the interstitium diffusely. CT chest PE studies awaited   Patient is admitted to the observation unit for further management and care.    Past Medical History:   Diagnosis Date    Arthritis     COPD (chronic obstructive pulmonary disease)     Disorder of kidney and ureter     Hypertension     Liver disease     Osteoarthritis     Seizures     Sleep apnea        Past Surgical History:   Procedure Laterality Date    ABDOMINAL SURGERY      x3    APPENDECTOMY      BREAST SURGERY      CHOLECYSTECTOMY      GANGLION CYST EXCISION Left     HYSTERECTOMY      KNEE ARTHROSCOPY      LAPAROSCOPY      lithrotripsy      MYRINGOTOMY W/ TUBES Left      OOPHORECTOMY      TONSILLECTOMY      TOTAL REDUCTION MAMMOPLASTY      TRIGGER FINGER RELEASE Right        Review of patient's allergies indicates:   Allergen Reactions    Fluoxetine Other (See Comments)     Other reaction(s): Unknown  Suicidal    Other reaction(s): Unknown  Suicidal    Other reaction(s): Unknown Suicidal    Meperidine Other (See Comments)     Other reaction(s): Unknown  Seizures    Other reaction(s): Unknown  Seizures    Other reaction(s): Unknown Seizures    Tetracyclines Other (See Comments)     Other reaction(s): Unknown  Childhood unknown allergy    Other reaction(s): Unknown  Childhood unknown allergy    Other reaction(s): Unknown Childhood unknown allergy    Adhesive tape-silicones Rash    Erythromycin      Other reaction(s): Unknown  Other reaction(s): Unknown  Other reaction(s): Unknown    Celecoxib      Other reaction(s): Unknown  Other reaction(s): Unknown  Other reaction(s): Unknown    Cyclobenzaprine      depression    Adhesive Rash     Other reaction(s): Unknown  Note: plastic tape       No current facility-administered medications on file prior to encounter.     Current Outpatient Medications on File Prior to Encounter   Medication Sig    ENBREL SURECLICK 50 mg/mL (1 mL) PnIj Inject 50 mg into the skin every 7 days.    carBAMazepine (TEGRETOL XR) 100 MG 12 hr tablet Take 5 tablets (500 mg total) by mouth 3 (three) times daily.    cholecalciferol, vitamin D3, 1,250 mcg (50,000 unit) capsule Vitamin D3 1.25 MG (76860 UT) Oral Capsule QTY: 12 capsule Days: 30 Refills: 3  Written: 12/05/21 Patient Instructions: 1 capsule weekly x 12 weeks    clobetasoL (TEMOVATE) 0.05 % external solution Apply to scalp  BID tapering with improvement    clotrimazole-betamethasone 1-0.05% (LOTRISONE) cream     cyanocobalamin (VITAMIN B-12) 1000 MCG tablet Take 2,000 mcg by mouth once daily.    dicyclomine (BENTYL) 10 MG capsule Take 1 capsule (10 mg total) by mouth 3 (three) times daily as needed (abdominal  pain).    ergocalciferol (ERGOCALCIFEROL) 50,000 unit Cap Take 50,000 Units by mouth every 7 days.    EScitalopram oxalate (LEXAPRO) 20 MG tablet Take 20 mg by mouth once daily.    febuxostat (ULORIC) 40 mg Tab Take 40 mg by mouth once daily.    fluconazole (DIFLUCAN) 100 MG tablet Take 100 mg by mouth as needed.    furosemide (LASIX) 20 MG tablet Take 20 mg by mouth as needed.    ibuprofen (ADVIL,MOTRIN) 800 MG tablet Take 800 mg by mouth every 6 (six) hours as needed for Pain.    ketoconazole (NIZORAL) 2 % shampoo Use as a scalp treatment 2-3 times a week for maintenance, massaging into scalp and leaving on for up to 3 minutes before rinsing    magnesium 200 mg Tab Magnesium 400 MG Oral Tablet QTY: 30 tablet Days: 30 Refills: 5  Written: 10/17/22 Patient Instructions: Take one po qd    mupirocin (BACTROBAN) 2 % ointment SMARTSI Application Topical 2-3 Times Daily    oxyCODONE-acetaminophen (PERCOCET)  mg per tablet     phentermine (ADIPEX-P) 37.5 mg tablet     potassium chloride SA (K-DUR,KLOR-CON) 20 MEQ tablet     predniSONE (DELTASONE) 10 MG tablet Take 10 mg by mouth as needed.    promethazine (PHENERGAN) 25 MG tablet Take 25 mg by mouth as needed.    triamcinolone acetonide 0.025% (KENALOG) 0.025 % cream Apply to AA on body BID PRN flares tapering with improvement    VENTOLIN HFA 90 mcg/actuation inhaler     zafirlukast (ACCOLATE) 10 MG Tab Take 10 mg by mouth once daily.    [DISCONTINUED] etanercept (ENBREL) 25 mg (1 mL) SolR injection Inject 25 mg into the skin once a week.    [DISCONTINUED] nabumetone (RELAFEN) 500 MG tablet Take 500 mg by mouth once daily. (Patient not taking: Reported on 10/21/2024)     Family History       Problem Relation (Age of Onset)    Breast cancer Sister, Paternal Grandmother          Tobacco Use    Smoking status: Former    Smokeless tobacco: Never   Substance and Sexual Activity    Alcohol use: Not Currently    Drug use: Not Currently    Sexual activity: Not on file      Review of Systems   HENT:  Negative for sinus pain and sore throat.    Respiratory:  Positive for cough and shortness of breath.    Cardiovascular:  Positive for chest pain.   Gastrointestinal:  Negative for abdominal pain.   Musculoskeletal:  Positive for arthralgias.   Neurological:  Positive for weakness. Negative for syncope.   Psychiatric/Behavioral:  Negative for agitation and behavioral problems.      Objective:     Vital Signs (Most Recent):  Temp: 97.3 °F (36.3 °C) (01/25/25 0427)  Pulse: 77 (01/25/25 1527)  Resp: 13 (01/25/25 0550)  BP: (!) 177/98 (01/25/25 1523)  SpO2: 95 % (01/25/25 1527) Vital Signs (24h Range):  Temp:  [97.3 °F (36.3 °C)] 97.3 °F (36.3 °C)  Pulse:  [64-83] 77  Resp:  [13-21] 13  SpO2:  [92 %-97 %] 95 %  BP: (145-188)/(57-98) 177/98     Weight: (!) 150.1 kg (331 lb)  Body mass index is 53.42 kg/m².     Physical Exam  Constitutional:       Appearance: She is obese.   Cardiovascular:      Rate and Rhythm: Normal rate and regular rhythm.      Pulses: Normal pulses.      Heart sounds: Normal heart sounds.   Pulmonary:      Effort: Pulmonary effort is normal.      Breath sounds: No wheezing or rales.   Abdominal:      Palpations: Abdomen is soft.   Musculoskeletal:         General: Normal range of motion.      Cervical back: Neck supple.   Skin:     General: Skin is warm.      Capillary Refill: Capillary refill takes less than 2 seconds.   Neurological:      General: No focal deficit present.      Mental Status: She is alert and oriented to person, place, and time.   Psychiatric:         Mood and Affect: Mood normal.                Significant Labs: All pertinent labs within the past 24 hours have been reviewed.    Significant Imaging: I have reviewed all pertinent imaging results/findings within the past 24 hours.  Assessment/Plan:     Pneumonia due to COVID-19 virus  Patient is identified as High risk for severe complications of COVID 19 based on COVID risk score of 2   Initiate  standard COVID protocols; COVID-19 testing ,Infection Control notification  and isolation- respiratory, contact and droplet per protocol    Diagnostics: CBC, CMP, CRP, and Portable CXR, Pro calcitonin and CT chest    Management: Initiate targeted therapy with Remdesivir, 200mg IV x1, followed by 100mg IV daily x3 days total.   Maintain oxygen saturations 92-96% via Nasal Cannula  LPM and monitor with continuous/intermittent pulse oximetry. , and Inhaled bronchodilators as needed for shortness of breath.    Advance Care Planning Current advance care plan has been discussed with patient and patient currently wishes Full Code.     Viral bronchitis  Viral bronchitis is a differential given her history of Cough and shortness of breath since past 6 weeks. Negative Covid status in these past few weeks as per patient. Covid positive today  Has completed course of Z-Chandra, doxycycline and Augmentin, Rocephin shots, Medrol dose pack. Currently on Levofloxacin Day 2  Viral bronchitis likely secondary to recent upper respiratory viral infection  Suggestive symptoms: cough(non-productive), chest discomfort, fatigue  No high grade fever or purulent sputum  Conservative treatment for now  Duo-nebs inhalation treatment, adequate hydration and monitor to prevent secondary bacterial infection        GAMAL (obstructive sleep apnea)  History of sleep apnea, likely due to morbid obesity  Patient was recommended home CPAP  Not using currently since she does not have the machine at home  Continue CPAP at hospital during naps/ nightly      Severe obesity (BMI >= 40)  Body mass index is 53.42 kg/m².   Morbid obesity complicates all aspects of disease management from diagnostic modalities to treatment.   Weight loss encouraged and health benefits explained to patient.         Hypokalemia  Patient's most recent potassium results are listed below.   Recent Labs     01/25/25  0519   K 2.9*     Plan  - Replete potassium per protocol.   - Monitor  potassium Daily  - Patient's hypokalemia is stable  -Oral KCL is supplemented. Will adjust dose as needed    Epilepsy  Continue with home Carbamazepine    Patient is compliant with her meds      VTE Risk Mitigation (From admission, onward)           Ordered     enoxaparin injection 40 mg  Every 12 hours         01/25/25 1643     IP VTE HIGH RISK PATIENT  Once         01/25/25 1643                           On 01/25/2025, patient should be placed in hospital observation services under services of Dr Long.     Vladimir Mccracken MD  Department of Hospital Medicine  [facility] - Emergency Department

## 2025-01-26 LAB
ANION GAP SERPL CALCULATED.3IONS-SCNC: 13 MMOL/L (ref 7–16)
BASOPHILS # BLD AUTO: 0.04 K/UL (ref 0–0.2)
BASOPHILS NFR BLD AUTO: 0.3 % (ref 0–1)
BUN SERPL-MCNC: 18 MG/DL (ref 10–20)
BUN/CREAT SERPL: 24 (ref 6–20)
CALCIUM SERPL-MCNC: 8.7 MG/DL (ref 8.4–10.2)
CHLORIDE SERPL-SCNC: 108 MMOL/L (ref 98–107)
CO2 SERPL-SCNC: 25 MMOL/L (ref 22–29)
CREAT SERPL-MCNC: 0.74 MG/DL (ref 0.55–1.02)
DIFFERENTIAL METHOD BLD: ABNORMAL
EGFR (NO RACE VARIABLE) (RUSH/TITUS): 96 ML/MIN/1.73M2
EOSINOPHIL # BLD AUTO: 0.01 K/UL (ref 0–0.5)
EOSINOPHIL NFR BLD AUTO: 0.1 % (ref 1–4)
ERYTHROCYTE [DISTWIDTH] IN BLOOD BY AUTOMATED COUNT: 13.4 % (ref 11.5–14.5)
GLUCOSE SERPL-MCNC: 106 MG/DL (ref 74–100)
HCT VFR BLD AUTO: 31.6 % (ref 38–47)
HGB BLD-MCNC: 10.3 G/DL (ref 12–16)
IMM GRANULOCYTES # BLD AUTO: 0.12 K/UL (ref 0–0.04)
IMM GRANULOCYTES NFR BLD: 1 % (ref 0–0.4)
LYMPHOCYTES # BLD AUTO: 3.19 K/UL (ref 1–4.8)
LYMPHOCYTES NFR BLD AUTO: 25.4 % (ref 27–41)
MCH RBC QN AUTO: 31.1 PG (ref 27–31)
MCHC RBC AUTO-ENTMCNC: 32.6 G/DL (ref 32–36)
MCV RBC AUTO: 95.5 FL (ref 80–96)
MONOCYTES # BLD AUTO: 0.79 K/UL (ref 0–0.8)
MONOCYTES NFR BLD AUTO: 6.3 % (ref 2–6)
MPC BLD CALC-MCNC: 11.1 FL (ref 9.4–12.4)
NEUTROPHILS # BLD AUTO: 8.42 K/UL (ref 1.8–7.7)
NEUTROPHILS NFR BLD AUTO: 66.9 % (ref 53–65)
NRBC # BLD AUTO: 0 X10E3/UL
NRBC, AUTO (.00): 0 %
OHS QRS DURATION: 118 MS
OHS QTC CALCULATION: 429 MS
PLATELET # BLD AUTO: 233 K/UL (ref 150–400)
POTASSIUM SERPL-SCNC: 3.3 MMOL/L (ref 3.5–5.1)
RBC # BLD AUTO: 3.31 M/UL (ref 4.2–5.4)
SODIUM SERPL-SCNC: 143 MMOL/L (ref 136–145)
WBC # BLD AUTO: 12.57 K/UL (ref 4.5–11)

## 2025-01-26 PROCEDURE — 25000003 PHARM REV CODE 250

## 2025-01-26 PROCEDURE — 85025 COMPLETE CBC W/AUTO DIFF WBC: CPT

## 2025-01-26 PROCEDURE — 94799 UNLISTED PULMONARY SVC/PX: CPT

## 2025-01-26 PROCEDURE — 63600175 PHARM REV CODE 636 W HCPCS: Mod: JZ,TB

## 2025-01-26 PROCEDURE — 99900035 HC TECH TIME PER 15 MIN (STAT)

## 2025-01-26 PROCEDURE — G0378 HOSPITAL OBSERVATION PER HR: HCPCS

## 2025-01-26 PROCEDURE — 5A09457 ASSISTANCE WITH RESPIRATORY VENTILATION, 24-96 CONSECUTIVE HOURS, CONTINUOUS POSITIVE AIRWAY PRESSURE: ICD-10-PCS | Performed by: FAMILY MEDICINE

## 2025-01-26 PROCEDURE — 27000190 HC CPAP FULL FACE MASK W/VALVE

## 2025-01-26 PROCEDURE — 94660 CPAP INITIATION&MGMT: CPT

## 2025-01-26 PROCEDURE — 11000001 HC ACUTE MED/SURG PRIVATE ROOM

## 2025-01-26 PROCEDURE — 96372 THER/PROPH/DIAG INJ SC/IM: CPT

## 2025-01-26 PROCEDURE — 94761 N-INVAS EAR/PLS OXIMETRY MLT: CPT

## 2025-01-26 PROCEDURE — 27000221 HC OXYGEN, UP TO 24 HOURS

## 2025-01-26 PROCEDURE — 99232 SBSQ HOSP IP/OBS MODERATE 35: CPT | Mod: GC,,, | Performed by: FAMILY MEDICINE

## 2025-01-26 PROCEDURE — 36415 COLL VENOUS BLD VENIPUNCTURE: CPT

## 2025-01-26 PROCEDURE — 80048 BASIC METABOLIC PNL TOTAL CA: CPT

## 2025-01-26 PROCEDURE — 27000207 HC ISOLATION

## 2025-01-26 RX ORDER — CARBAMAZEPINE 100 MG/1
100 TABLET, EXTENDED RELEASE ORAL
Status: DISCONTINUED | OUTPATIENT
Start: 2025-01-26 | End: 2025-01-27 | Stop reason: HOSPADM

## 2025-01-26 RX ORDER — CARBAMAZEPINE 100 MG/1
200 TABLET, EXTENDED RELEASE ORAL 2 TIMES DAILY
Status: DISCONTINUED | OUTPATIENT
Start: 2025-01-26 | End: 2025-01-27 | Stop reason: HOSPADM

## 2025-01-26 RX ADMIN — HYDROCODONE BITARTRATE AND ACETAMINOPHEN 1 TABLET: 10; 325 TABLET ORAL at 09:01

## 2025-01-26 RX ADMIN — ENOXAPARIN SODIUM 40 MG: 40 INJECTION SUBCUTANEOUS at 12:01

## 2025-01-26 RX ADMIN — CARBAMAZEPINE 200 MG: 100 TABLET, EXTENDED RELEASE ORAL at 09:01

## 2025-01-26 RX ADMIN — POTASSIUM CHLORIDE 20 MEQ: 1500 TABLET, EXTENDED RELEASE ORAL at 09:01

## 2025-01-26 RX ADMIN — MONTELUKAST 10 MG: 10 TABLET, FILM COATED ORAL at 09:01

## 2025-01-26 RX ADMIN — HYDROCODONE BITARTRATE AND ACETAMINOPHEN 1 TABLET: 10; 325 TABLET ORAL at 12:01

## 2025-01-26 RX ADMIN — ESCITALOPRAM OXALATE 20 MG: 10 TABLET ORAL at 09:01

## 2025-01-26 RX ADMIN — CARBAMAZEPINE 100 MG: 100 TABLET, EXTENDED RELEASE ORAL at 01:01

## 2025-01-26 RX ADMIN — ENOXAPARIN SODIUM 40 MG: 40 INJECTION SUBCUTANEOUS at 09:01

## 2025-01-26 RX ADMIN — REMDESIVIR 100 MG: 100 INJECTION, POWDER, LYOPHILIZED, FOR SOLUTION INTRAVENOUS at 05:01

## 2025-01-26 RX ADMIN — LEVOFLOXACIN 750 MG: 750 TABLET, FILM COATED ORAL at 09:01

## 2025-01-26 NOTE — SUBJECTIVE & OBJECTIVE
Interval History: Patient doing well overall, improving. Need on more day of IV remedesivir, will discharge likely tomorrow or Tuesday.     Review of Systems   HENT:  Negative for sinus pain and sore throat.    Respiratory:  Positive for cough and shortness of breath.    Cardiovascular:  Positive for chest pain.   Gastrointestinal:  Negative for abdominal pain.   Musculoskeletal:  Positive for arthralgias.   Neurological:  Positive for weakness. Negative for syncope.   Psychiatric/Behavioral:  Negative for agitation and behavioral problems.      Objective:     Vital Signs (Most Recent):  Temp: 97.8 °F (36.6 °C) (01/26/25 0817)  Pulse: 62 (01/26/25 0817)  Resp: 16 (01/26/25 0817)  BP: (!) 187/89 (01/26/25 0817)  SpO2: 96 % (01/26/25 0817) Vital Signs (24h Range):  Temp:  [97.8 °F (36.6 °C)-98.6 °F (37 °C)] 97.8 °F (36.6 °C)  Pulse:  [60-83] 62  Resp:  [15-18] 16  SpO2:  [92 %-98 %] 96 %  BP: (146-188)/(57-98) 187/89     Weight: (!) 150.1 kg (330 lb 14.6 oz)  Body mass index is 53.41 kg/m².  No intake or output data in the 24 hours ending 01/26/25 1045      Physical Exam  Vitals and nursing note reviewed.   Constitutional:       Appearance: She is obese.   Cardiovascular:      Rate and Rhythm: Normal rate and regular rhythm.      Pulses: Normal pulses.      Heart sounds: Normal heart sounds.   Pulmonary:      Effort: Pulmonary effort is normal.      Breath sounds: No wheezing or rales.   Abdominal:      Palpations: Abdomen is soft.   Musculoskeletal:         General: Normal range of motion.      Cervical back: Neck supple.   Skin:     General: Skin is warm.      Capillary Refill: Capillary refill takes less than 2 seconds.   Neurological:      General: No focal deficit present.      Mental Status: She is alert and oriented to person, place, and time.   Psychiatric:         Mood and Affect: Mood normal.             Significant Labs: All pertinent labs within the past 24 hours have been reviewed.    Significant Imaging:  I have reviewed all pertinent imaging results/findings within the past 24 hours.

## 2025-01-26 NOTE — ASSESSMENT & PLAN NOTE
Patient is identified as High risk for severe complications of COVID 19 based on COVID risk score of 2   Initiate standard COVID protocols; COVID-19 testing ,Infection Control notification  and isolation- respiratory, contact and droplet per protocol    Diagnostics: CBC, CMP, CRP, and Portable CXR, Pro calcitonin and CT chest    Management: Initiate targeted therapy with Remdesivir, 200mg IV x1, followed by 100mg IV daily x3 days total.   Maintain oxygen saturations 92-96% via Nasal Cannula  LPM and monitor with continuous/intermittent pulse oximetry. , and Inhaled bronchodilators as needed for shortness of breath.    Advance Care Planning  Current advance care plan has been discussed with patient and patient currently wishes Full Code.     Will start on levaquin

## 2025-01-26 NOTE — ASSESSMENT & PLAN NOTE
Body mass index is 53.41 kg/m².   Morbid obesity complicates all aspects of disease management from diagnostic modalities to treatment.   Weight loss encouraged and health benefits explained to patient.

## 2025-01-26 NOTE — PROGRESS NOTES
[facility] Medicine  Progress Note    Patient Name: Marian Foster  MRN: [id number]  Patient Class: OP- Observation   Admission Date: 1/25/2025  Length of Stay: 0 days  Attending Physician: Jose Martin Long Jr., MD  Primary Care Provider: Diallo Sutton II, MD        Subjective     Principal Problem:Pneumonia due to COVID-19 virus        HPI:  Patient complains of shortness breath, chest discomfort and cough for the past 6 weeks which started after exposure to her granddaughter who had flu symptoms at that time. The symptoms progressively worsened which led her to see her primary care multiple times She has had Rocephin shots and Decadron shots x3. She has been treated with Z-Chandra doxycycline and Augmentin. Currently she is on Levaquin as in the past 2 days. Her primary care doctor referred her to the hospital for consideration of the admission.   ED presenting vitals /94, HR 76, RR 18, 02 sat 95 percent  ED initial labs Covid positive, WBC 13, Hb 11, Plat 246, Na 243, K 2.9, BUN 14, Creat 0.73  Chest X-ray:no focal consolidation or evidence of gross congestive failure. There does appear to be prominence of the interstitium diffusely. CT chest PE studies awaited   Patient is admitted to the observation unit for further management and care.    Overview/Hospital Course:  No notes on file    Interval History: Patient doing well overall, improving. Need on more day of IV remedesivir, will discharge likely tomorrow or Tuesday.     Review of Systems   HENT:  Negative for sinus pain and sore throat.    Respiratory:  Positive for cough and shortness of breath.    Cardiovascular:  Positive for chest pain.   Gastrointestinal:  Negative for abdominal pain.   Musculoskeletal:  Positive for arthralgias.   Neurological:  Positive for weakness. Negative for syncope.   Psychiatric/Behavioral:  Negative for agitation and behavioral problems.      Objective:     Vital Signs (Most Recent):  Temp:  97.8 °F (36.6 °C) (01/26/25 0817)  Pulse: 62 (01/26/25 0817)  Resp: 16 (01/26/25 0817)  BP: (!) 187/89 (01/26/25 0817)  SpO2: 96 % (01/26/25 0817) Vital Signs (24h Range):  Temp:  [97.8 °F (36.6 °C)-98.6 °F (37 °C)] 97.8 °F (36.6 °C)  Pulse:  [60-83] 62  Resp:  [15-18] 16  SpO2:  [92 %-98 %] 96 %  BP: (146-188)/(57-98) 187/89     Weight: (!) 150.1 kg (330 lb 14.6 oz)  Body mass index is 53.41 kg/m².  No intake or output data in the 24 hours ending 01/26/25 1045      Physical Exam  Vitals and nursing note reviewed.   Constitutional:       Appearance: She is obese.   Cardiovascular:      Rate and Rhythm: Normal rate and regular rhythm.      Pulses: Normal pulses.      Heart sounds: Normal heart sounds.   Pulmonary:      Effort: Pulmonary effort is normal.      Breath sounds: No wheezing or rales.   Abdominal:      Palpations: Abdomen is soft.   Musculoskeletal:         General: Normal range of motion.      Cervical back: Neck supple.   Skin:     General: Skin is warm.      Capillary Refill: Capillary refill takes less than 2 seconds.   Neurological:      General: No focal deficit present.      Mental Status: She is alert and oriented to person, place, and time.   Psychiatric:         Mood and Affect: Mood normal.             Significant Labs: All pertinent labs within the past 24 hours have been reviewed.    Significant Imaging: I have reviewed all pertinent imaging results/findings within the past 24 hours.    Assessment and Plan     * Pneumonia due to COVID-19 virus  Patient is identified as High risk for severe complications of COVID 19 based on COVID risk score of 2   Initiate standard COVID protocols; COVID-19 testing ,Infection Control notification  and isolation- respiratory, contact and droplet per protocol    Diagnostics: CBC, CMP, CRP, and Portable CXR, Pro calcitonin and CT chest    Management: Initiate targeted therapy with Remdesivir, 200mg IV x1, followed by 100mg IV daily x3 days total.   Maintain  oxygen saturations 92-96% via Nasal Cannula  LPM and monitor with continuous/intermittent pulse oximetry. , and Inhaled bronchodilators as needed for shortness of breath.    Advance Care Planning Current advance care plan has been discussed with patient and patient currently wishes Full Code.     Will start on levaquin    Viral bronchitis  Viral bronchitis is a differential given her history of Cough and shortness of breath since past 6 weeks. Negative Covid status in these past few weeks as per patient. Covid positive today  Has completed course of Z-Chandra, doxycycline and Augmentin, Rocephin shots, Medrol dose pack. Currently on Levofloxacin Day 2  Viral bronchitis likely secondary to recent upper respiratory viral infection  Suggestive symptoms: cough(non-productive), chest discomfort, fatigue  No high grade fever or purulent sputum  Conservative treatment for now  Duo-nebs inhalation treatment, adequate hydration and monitor to prevent secondary bacterial infection        Hypokalemia  Patient's most recent potassium results are listed below.   Recent Labs     01/25/25  0519 01/26/25  0534   K 2.9* 3.3*       Plan  - Replete potassium per protocol.   - Monitor potassium Daily  - Patient's hypokalemia is stable  -Oral KCL is supplemented. Will adjust dose as needed    Epilepsy  Continue with home Carbamazepine    Patient is compliant with her meds    GAMAL (obstructive sleep apnea)  History of sleep apnea, likely due to morbid obesity  Patient was recommended home CPAP  Not using currently since she does not have the machine at home  Continue CPAP at hospital during naps/ nightly      Severe obesity (BMI >= 40)  Body mass index is 53.41 kg/m².   Morbid obesity complicates all aspects of disease management from diagnostic modalities to treatment.   Weight loss encouraged and health benefits explained to patient.           VTE Risk Mitigation (From admission, onward)           Ordered     enoxaparin injection 40 mg   Every 12 hours         01/25/25 1643     IP VTE HIGH RISK PATIENT  Once         01/25/25 1643                    Discharge Planning   KAROL:      Code Status: Full Code   Medical Readiness for Discharge Date:                            Sumit Tello DO  Department of Hospital Medicine   [facility]

## 2025-01-26 NOTE — PHARMACY MED REC
"Admission Medication History     The home medication history was taken by Tanja Conley.    You may go to "Admission" then "Reconcile Home Medications" tabs to review and/or act upon these items.     The home medication list has been updated by the Pharmacy department.   Please read ALL comments highlighted in yellow.   Please address this information as you see fit.    Feel free to contact us if you have any questions or require assistance.  Medications Added:  Baclofen 10 mg      Patient reports no longer taking the following medication(s). The medication(s) listed below were removed from the home medication list. Please reorder if appropriate:  Vitamin D3 50,000 iu  Clotrimazole-betamethasone 1-0.05% cream  Magnesium 200 mg  Mupirocin 2% ointment  Nabumetone 500 mg  Promethazine 25 mg  Triamcinolone 0.025% cream    Medications listed below were obtained from: [email or web address] software- [employer] and Medical records  (Not in a hospital admission)        Current Outpatient Medications on File Prior to Encounter   Medication Sig Dispense Refill Last Dose/Taking    baclofen (LIORESAL) 10 MG tablet Take 10 mg by mouth 3 (three) times daily.   Taking    carBAMazepine (TEGRETOL XR) 100 MG 12 hr tablet Take 5 tablets (500 mg total) by mouth 3 (three) times daily. 540 tablet 3 Taking    clobetasoL (TEMOVATE) 0.05 % external solution Apply to scalp  BID tapering with improvement 50 mL 5 Taking    cyanocobalamin (VITAMIN B-12) 1000 MCG tablet Take 2,000 mcg by mouth once daily.   Taking    ENBREL SURECLICK 50 mg/mL (1 mL) PnIj Inject 50 mg into the skin every 7 days.   Taking    EScitalopram oxalate (LEXAPRO) 20 MG tablet Take 20 mg by mouth once daily.   Taking    febuxostat (ULORIC) 40 mg Tab Take 40 mg by mouth once daily.   Taking    fluconazole (DIFLUCAN) 100 MG tablet Take 100 mg by mouth as needed.   Taking As Needed    furosemide (LASIX) 20 MG tablet Take 20 mg by mouth as needed.   Taking As Needed    ibuprofen (ADVIL,MOTRIN) " 800 MG tablet Take 800 mg by mouth every 6 (six) hours as needed for Pain.   Taking As Needed    ketoconazole (NIZORAL) 2 % shampoo Use as a scalp treatment 2-3 times a week for maintenance, massaging into scalp and leaving on for up to 3 minutes before rinsing 120 mL 4 Taking    oxyCODONE-acetaminophen (PERCOCET)  mg per tablet Take 1 tablet by mouth every 6 (six) hours as needed.   Taking As Needed    phentermine (ADIPEX-P) 37.5 mg tablet Take 37.5 mg by mouth once daily.   Taking    potassium chloride SA (K-DUR,KLOR-CON) 20 MEQ tablet Take 20 mEq by mouth 2 (two) times daily.   Taking    predniSONE (DELTASONE) 10 MG tablet Take 10 mg by mouth as needed.   Taking As Needed    VENTOLIN HFA 90 mcg/actuation inhaler    Taking    zafirlukast (ACCOLATE) 10 MG Tab Take 10 mg by mouth once daily.   Taking    dicyclomine (BENTYL) 10 MG capsule Take 1 capsule (10 mg total) by mouth 3 (three) times daily as needed (abdominal pain). 120 capsule 0     [DISCONTINUED] cholecalciferol, vitamin D3, 1,250 mcg (50,000 unit) capsule Vitamin D3 1.25 MG (54148 UT) Oral Capsule QTY: 12 capsule Days: 30 Refills: 3  Written: 21 Patient Instructions: 1 capsule weekly x 12 weeks       [DISCONTINUED] clotrimazole-betamethasone 1-0.05% (LOTRISONE) cream        [DISCONTINUED] ergocalciferol (ERGOCALCIFEROL) 50,000 unit Cap Take 50,000 Units by mouth every 7 days.       [DISCONTINUED] etanercept (ENBREL) 25 mg (1 mL) SolR injection Inject 25 mg into the skin once a week.       [DISCONTINUED] magnesium 200 mg Tab Magnesium 400 MG Oral Tablet QTY: 30 tablet Days: 30 Refills: 5  Written: 10/17/22 Patient Instructions: Take one po qd       [DISCONTINUED] mupirocin (BACTROBAN) 2 % ointment SMARTSI Application Topical 2-3 Times Daily       [DISCONTINUED] nabumetone (RELAFEN) 500 MG tablet Take 500 mg by mouth once daily. (Patient not taking: Reported on 10/21/2024)       [DISCONTINUED] promethazine (PHENERGAN) 25 MG tablet Take 25 mg  by mouth as needed.       [DISCONTINUED] triamcinolone acetonide 0.025% (KENALOG) 0.025 % cream Apply to AA on body BID PRN flares tapering with improvement 80 g 6          Potential issues to be addressed PRIOR TO DISCHARGE  N/A.    Tanja Conley  Medication Access Specialist  EXT. [id number]    .

## 2025-01-26 NOTE — ASSESSMENT & PLAN NOTE
Patient's most recent potassium results are listed below.   Recent Labs     01/25/25  0519 01/26/25  0534   K 2.9* 3.3*       Plan  - Replete potassium per protocol.   - Monitor potassium Daily  - Patient's hypokalemia is stable  -Oral KCL is supplemented. Will adjust dose as needed

## 2025-01-26 NOTE — PROGRESS NOTES
[facility] Medicine  Progress Note    Patient Name: Marian Foster  MRN: [id number]  Patient Class: OP- Observation   Admission Date: 1/25/2025  Length of Stay: 0 days  Attending Physician: Jose Martin Long Jr., MD  Primary Care Provider: Diallo Sutton II, MD        Subjective     Principal Problem:Pneumonia due to COVID-19 virus        HPI:  Patient complains of shortness breath, chest discomfort and cough for the past 6 weeks which started after exposure to her granddaughter who had flu symptoms at that time. The symptoms progressively worsened which led her to see her primary care multiple times She has had Rocephin shots and Decadron shots x3. She has been treated with Z-Chandra doxycycline and Augmentin. Currently she is on Levaquin as in the past 2 days. Her primary care doctor referred her to the hospital for consideration of the admission.   ED presenting vitals /94, HR 76, RR 18, 02 sat 95 percent  ED initial labs Covid positive, WBC 13, Hb 11, Plat 246, Na 243, K 2.9, BUN 14, Creat 0.73  Chest X-ray:no focal consolidation or evidence of gross congestive failure. There does appear to be prominence of the interstitium diffusely. CT chest PE studies awaited   Patient is admitted to the observation unit for further management and care.    Overview/Hospital Course:  No notes on file    No new subjective & objective note has been filed under this hospital service since the last note was generated.      Assessment and Plan     * Pneumonia due to COVID-19 virus  Patient is identified as High risk for severe complications of COVID 19 based on COVID risk score of 2   Initiate standard COVID protocols; COVID-19 testing ,Infection Control notification  and isolation- respiratory, contact and droplet per protocol    Diagnostics: CBC, CMP, CRP, and Portable CXR, Pro calcitonin and CT chest    Management: Initiate targeted therapy with Remdesivir, 200mg IV x1, followed by 100mg IV daily  x3 days total.   Maintain oxygen saturations 92-96% via Nasal Cannula  LPM and monitor with continuous/intermittent pulse oximetry. , and Inhaled bronchodilators as needed for shortness of breath.    Advance Care Planning Current advance care plan has been discussed with patient and patient currently wishes Full Code.     Will start on levaquin    Viral bronchitis  Viral bronchitis is a differential given her history of Cough and shortness of breath since past 6 weeks. Negative Covid status in these past few weeks as per patient. Covid positive today  Has completed course of Z-Chandra, doxycycline and Augmentin, Rocephin shots, Medrol dose pack. Currently on Levofloxacin Day 2  Viral bronchitis likely secondary to recent upper respiratory viral infection  Suggestive symptoms: cough(non-productive), chest discomfort, fatigue  No high grade fever or purulent sputum  Conservative treatment for now  Duo-nebs inhalation treatment, adequate hydration and monitor to prevent secondary bacterial infection        Hypokalemia  Patient's most recent potassium results are listed below.   Recent Labs     01/25/25  0519 01/26/25  0534   K 2.9* 3.3*       Plan  - Replete potassium per protocol.   - Monitor potassium Daily  - Patient's hypokalemia is stable  -Oral KCL is supplemented. Will adjust dose as needed    Epilepsy  Continue with home Carbamazepine    Patient is compliant with her meds    GAMAL (obstructive sleep apnea)  History of sleep apnea, likely due to morbid obesity  Patient was recommended home CPAP  Not using currently since she does not have the machine at home  Continue CPAP at hospital during naps/ nightly      Severe obesity (BMI >= 40)  Body mass index is 53.41 kg/m².   Morbid obesity complicates all aspects of disease management from diagnostic modalities to treatment.   Weight loss encouraged and health benefits explained to patient.           VTE Risk Mitigation (From admission, onward)           Ordered      enoxaparin injection 40 mg  Every 12 hours         01/25/25 1643     IP VTE HIGH RISK PATIENT  Once         01/25/25 1643                    Discharge Planning   KAROL:      Code Status: Full Code   Medical Readiness for Discharge Date:                            Sumit Tello DO  Department of Hospital Medicine   [facility]

## 2025-01-27 VITALS
HEIGHT: 66 IN | DIASTOLIC BLOOD PRESSURE: 79 MMHG | RESPIRATION RATE: 16 BRPM | SYSTOLIC BLOOD PRESSURE: 177 MMHG | BODY MASS INDEX: 47.09 KG/M2 | TEMPERATURE: 98 F | OXYGEN SATURATION: 96 % | HEART RATE: 79 BPM | WEIGHT: 293 LBS

## 2025-01-27 DIAGNOSIS — U07.1 COVID-19 VIRUS DETECTED: ICD-10-CM

## 2025-01-27 LAB
ANION GAP SERPL CALCULATED.3IONS-SCNC: 15 MMOL/L (ref 7–16)
AORTIC ROOT ANNULUS: 2.67 CM
AORTIC VALVE CUSP SEPERATION: 1.95 CM
APICAL FOUR CHAMBER EJECTION FRACTION: 45 %
AV INDEX (PROSTH): 0.92
AV MEAN GRADIENT: 3 MMHG
AV PEAK GRADIENT: 6 MMHG
AV VALVE AREA BY VELOCITY RATIO: 2.6 CM²
AV VALVE AREA: 2.9 CM²
AV VELOCITY RATIO: 0.83
BASOPHILS # BLD AUTO: 0.06 K/UL (ref 0–0.2)
BASOPHILS NFR BLD AUTO: 0.6 % (ref 0–1)
BSA FOR ECHO PROCEDURE: 2.64 M2
BUN SERPL-MCNC: 21 MG/DL (ref 10–20)
BUN/CREAT SERPL: 30 (ref 6–20)
CALCIUM SERPL-MCNC: 7.8 MG/DL (ref 8.4–10.2)
CHLORIDE SERPL-SCNC: 108 MMOL/L (ref 98–107)
CO2 SERPL-SCNC: 26 MMOL/L (ref 22–29)
CREAT SERPL-MCNC: 0.71 MG/DL (ref 0.55–1.02)
CV ECHO LV RWT: 0.54 CM
DIFFERENTIAL METHOD BLD: ABNORMAL
DOP CALC AO PEAK VEL: 1.2 M/S
DOP CALC AO VTI: 29.2 CM
DOP CALC LVOT AREA: 3.1 CM2
DOP CALC LVOT DIAMETER: 2 CM
DOP CALC LVOT PEAK VEL: 1 M/S
DOP CALC LVOT STROKE VOLUME: 84.2 CM3
DOP CALCLVOT PEAK VEL VTI: 26.8 CM
E WAVE DECELERATION TIME: 172 MSEC
E/A RATIO: 1.36
E/E' RATIO: 8 M/S
ECHO LV POSTERIOR WALL: 1.5 CM (ref 0.6–1.1)
EGFR (NO RACE VARIABLE) (RUSH/TITUS): 101 ML/MIN/1.73M2
EJECTION FRACTION: 60 %
EOSINOPHIL # BLD AUTO: 0.06 K/UL (ref 0–0.5)
EOSINOPHIL NFR BLD AUTO: 0.6 % (ref 1–4)
ERYTHROCYTE [DISTWIDTH] IN BLOOD BY AUTOMATED COUNT: 13.2 % (ref 11.5–14.5)
ERYTHROCYTE [SEDIMENTATION RATE] IN BLOOD BY WESTERGREN METHOD: 26 MM/HR (ref 0–30)
FRACTIONAL SHORTENING: 26.8 % (ref 28–44)
GLUCOSE SERPL-MCNC: 78 MG/DL (ref 74–100)
HCT VFR BLD AUTO: 32.1 % (ref 38–47)
HGB BLD-MCNC: 10 G/DL (ref 12–16)
IMM GRANULOCYTES # BLD AUTO: 0.09 K/UL (ref 0–0.04)
IMM GRANULOCYTES NFR BLD: 0.9 % (ref 0–0.4)
INTERVENTRICULAR SEPTUM: 1.3 CM (ref 0.6–1.1)
IVC DIAMETER: 0.97 CM
LEFT ATRIUM AREA SYSTOLIC (APICAL 4 CHAMBER): 17.57 CM2
LEFT ATRIUM SIZE: 3.7 CM
LEFT INTERNAL DIMENSION IN SYSTOLE: 4.1 CM (ref 2.1–4)
LEFT VENTRICLE DIASTOLIC VOLUME INDEX: 61.74 ML/M2
LEFT VENTRICLE DIASTOLIC VOLUME: 153.11 ML
LEFT VENTRICLE END DIASTOLIC VOLUME APICAL 4 CHAMBER: 77.46 ML
LEFT VENTRICLE END SYSTOLIC VOLUME APICAL 4 CHAMBER: 48.61 ML
LEFT VENTRICLE MASS INDEX: 140.2 G/M2
LEFT VENTRICLE SYSTOLIC VOLUME INDEX: 29.2 ML/M2
LEFT VENTRICLE SYSTOLIC VOLUME: 72.31 ML
LEFT VENTRICULAR INTERNAL DIMENSION IN DIASTOLE: 5.6 CM (ref 3.5–6)
LEFT VENTRICULAR MASS: 347.6 G
LV LATERAL E/E' RATIO: 7.9 M/S
LV SEPTAL E/E' RATIO: 8.8 M/S
LVED V (TEICH): 153.11 ML
LVES V (TEICH): 72.31 ML
LVOT MG: 2.12 MMHG
LVOT MV: 0.68 CM/S
LYMPHOCYTES # BLD AUTO: 3.72 K/UL (ref 1–4.8)
LYMPHOCYTES NFR BLD AUTO: 38 % (ref 27–41)
MCH RBC QN AUTO: 30.4 PG (ref 27–31)
MCHC RBC AUTO-ENTMCNC: 31.2 G/DL (ref 32–36)
MCV RBC AUTO: 97.6 FL (ref 80–96)
MONOCYTES # BLD AUTO: 0.64 K/UL (ref 0–0.8)
MONOCYTES NFR BLD AUTO: 6.5 % (ref 2–6)
MPC BLD CALC-MCNC: 10.8 FL (ref 9.4–12.4)
MV PEAK A VEL: 0.58 M/S
MV PEAK E VEL: 0.79 M/S
NEUTROPHILS # BLD AUTO: 5.23 K/UL (ref 1.8–7.7)
NEUTROPHILS NFR BLD AUTO: 53.4 % (ref 53–65)
NRBC # BLD AUTO: 0 X10E3/UL
NRBC, AUTO (.00): 0 %
OHS CV RV/LV RATIO: 0.68 CM
PISA TR MAX VEL: 2.2 M/S
PLATELET # BLD AUTO: 214 K/UL (ref 150–400)
POTASSIUM SERPL-SCNC: 3.5 MMOL/L (ref 3.5–5.1)
PV PEAK GRADIENT: 3 MMHG
PV PEAK VELOCITY: 0.81 M/S
RA MAJOR: 4.02 CM
RA PRESSURE ESTIMATED: 3 MMHG
RBC # BLD AUTO: 3.29 M/UL (ref 4.2–5.4)
RIGHT VENTRICLE DIASTOLIC BASEL DIMENSION: 3.8 CM
RIGHT VENTRICLE DIASTOLIC LENGTH: 5.1 CM
RIGHT VENTRICLE DIASTOLIC MID DIMENSION: 3.2 CM
RIGHT VENTRICULAR LENGTH IN DIASTOLE (APICAL 4-CHAMBER VIEW): 5.05 CM
RV MID DIAMA: 3.22 CM
RV TB RVSP: 5 MMHG
SODIUM SERPL-SCNC: 145 MMOL/L (ref 136–145)
TDI LATERAL: 0.1 M/S
TDI SEPTAL: 0.09 M/S
TDI: 0.1 M/S
TR MAX PG: 19 MMHG
TRICUSPID ANNULAR PLANE SYSTOLIC EXCURSION: 1.89 CM
TV REST PULMONARY ARTERY PRESSURE: 22 MMHG
WBC # BLD AUTO: 9.8 K/UL (ref 4.5–11)
Z-SCORE OF LEFT VENTRICULAR DIMENSION IN END DIASTOLE: -8.6
Z-SCORE OF LEFT VENTRICULAR DIMENSION IN END SYSTOLE: -5.15

## 2025-01-27 PROCEDURE — 25000003 PHARM REV CODE 250

## 2025-01-27 PROCEDURE — 25000003 PHARM REV CODE 250: Performed by: FAMILY MEDICINE

## 2025-01-27 PROCEDURE — 99900035 HC TECH TIME PER 15 MIN (STAT)

## 2025-01-27 PROCEDURE — 94660 CPAP INITIATION&MGMT: CPT

## 2025-01-27 PROCEDURE — 99239 HOSP IP/OBS DSCHRG MGMT >30: CPT | Mod: GC,,, | Performed by: FAMILY MEDICINE

## 2025-01-27 PROCEDURE — 1111F DSCHRG MED/CURRENT MED MERGE: CPT | Mod: CPTII,GC,, | Performed by: FAMILY MEDICINE

## 2025-01-27 PROCEDURE — 63600175 PHARM REV CODE 636 W HCPCS: Mod: JZ,TB

## 2025-01-27 PROCEDURE — 85651 RBC SED RATE NONAUTOMATED: CPT

## 2025-01-27 PROCEDURE — 94761 N-INVAS EAR/PLS OXIMETRY MLT: CPT

## 2025-01-27 PROCEDURE — 36415 COLL VENOUS BLD VENIPUNCTURE: CPT

## 2025-01-27 PROCEDURE — 80048 BASIC METABOLIC PNL TOTAL CA: CPT

## 2025-01-27 PROCEDURE — 27000221 HC OXYGEN, UP TO 24 HOURS

## 2025-01-27 PROCEDURE — 85025 COMPLETE CBC W/AUTO DIFF WBC: CPT

## 2025-01-27 RX ORDER — LEVOFLOXACIN 750 MG/1
750 TABLET ORAL DAILY
Qty: 2 TABLET | Refills: 0 | Status: SHIPPED | OUTPATIENT
Start: 2025-01-28 | End: 2025-01-30

## 2025-01-27 RX ORDER — MUPIROCIN 20 MG/G
OINTMENT TOPICAL 2 TIMES DAILY
Status: DISCONTINUED | OUTPATIENT
Start: 2025-01-27 | End: 2025-01-27 | Stop reason: HOSPADM

## 2025-01-27 RX ADMIN — POTASSIUM CHLORIDE 20 MEQ: 1500 TABLET, EXTENDED RELEASE ORAL at 09:01

## 2025-01-27 RX ADMIN — LEVOFLOXACIN 750 MG: 750 TABLET, FILM COATED ORAL at 09:01

## 2025-01-27 RX ADMIN — ESCITALOPRAM OXALATE 20 MG: 10 TABLET ORAL at 09:01

## 2025-01-27 RX ADMIN — CARBAMAZEPINE 200 MG: 100 TABLET, EXTENDED RELEASE ORAL at 09:01

## 2025-01-27 RX ADMIN — ENOXAPARIN SODIUM 40 MG: 40 INJECTION SUBCUTANEOUS at 09:01

## 2025-01-27 RX ADMIN — CARBAMAZEPINE 100 MG: 100 TABLET, EXTENDED RELEASE ORAL at 12:01

## 2025-01-27 RX ADMIN — MUPIROCIN: 20 OINTMENT TOPICAL at 09:01

## 2025-01-27 RX ADMIN — MONTELUKAST 10 MG: 10 TABLET, FILM COATED ORAL at 09:01

## 2025-01-27 RX ADMIN — REMDESIVIR 100 MG: 100 INJECTION, POWDER, LYOPHILIZED, FOR SOLUTION INTRAVENOUS at 09:01

## 2025-01-27 NOTE — HOSPITAL COURSE
1/27  Chest CT: was negative for pulmonary embolism.Bilateral mild lower lobe patchy alveolar infiltrate, right greater than left suggesting mild pneumonitis.  Mild infiltrate in the right upper lobe also. Pro calcitonin was normal. Normal ESR with raised CRP. Patient is being discharged today. She completed 3 doses of Remdesivir during the hospital stay. She feels better, cough and Shortness of breath has improved. Discharging on two more days of levofloxacin to complete a 5 day course. She is asked to continue CPAP at home and  follow up with her PCP in a week.

## 2025-01-27 NOTE — DISCHARGE SUMMARY
[facility] Medicine  Discharge Summary      Patient Name: Marian Foster  MRN: [id number]  LESLIE: [id number]  Patient Class: IP- Inpatient  Admission Date: 1/25/2025  Hospital Length of Stay: 1 days  Discharge Date and Time:  01/27/2025 11:49 AM  Attending Physician: Jose Martin Long Jr., MD   Discharging Provider: Vladimir Mccracken MD  Primary Care Provider: Diallo Sutton II, MD    Primary Care Team: Networked reference to record PCT     HPI:   Patient complains of shortness breath, chest discomfort and cough for the past 6 weeks which started after exposure to her granddaughter who had flu symptoms at that time. The symptoms progressively worsened which led her to see her primary care multiple times She has had Rocephin shots and Decadron shots x3. She has been treated with Z-Chandra doxycycline and Augmentin. Currently she is on Levaquin as in the past 2 days. Her primary care doctor referred her to the hospital for consideration of the admission.   ED presenting vitals /94, HR 76, RR 18, 02 sat 95 percent  ED initial labs Covid positive, WBC 13, Hb 11, Plat 246, Na 243, K 2.9, BUN 14, Creat 0.73  Chest X-ray:no focal consolidation or evidence of gross congestive failure. There does appear to be prominence of the interstitium diffusely. CT chest PE studies awaited   Patient is admitted to the observation unit for further management and care.    * No surgery found *      Hospital Course:   1/27  Chest CT: was negative for pulmonary embolism.Bilateral mild lower lobe patchy alveolar infiltrate, right greater than left suggesting mild pneumonitis.  Mild infiltrate in the right upper lobe also. Pro calcitonin was normal. Normal ESR with raised CRP. Patient is being discharged today. She completed 3 doses of Remdesivir during the hospital stay. She feels better, cough and Shortness of breath has improved. Discharging on two more days of levofloxacin to complete a 5 day course. She is  asked to continue CPAP at home and  follow up with her PCP in a week.     Goals of Care Treatment Preferences:  Code Status: Full Code      SDOH Screening:  The patient was screened for utility difficulties, food insecurity, transport difficulties, housing insecurity, and interpersonal safety and there were no concerns identified this admission.     Consults:     * Pneumonia due to COVID-19 virus  Patient is identified as High risk for severe complications of COVID 19 based on COVID risk score of 2   Initiate standard COVID protocols; COVID-19 testing ,Infection Control notification  and isolation- respiratory, contact and droplet per protocol    Diagnostics: CBC, CMP, CRP, and Portable CXR, Pro calcitonin and CT chest    Management: Initiate targeted therapy with Remdesivir, 200mg IV x1, followed by 100mg IV daily x3 days total.   Maintain oxygen saturations 92-96% via Nasal Cannula  LPM and monitor with continuous/intermittent pulse oximetry. , and Inhaled bronchodilators as needed for shortness of breath.    Advance Care Planning Current advance care plan has been discussed with patient and patient currently wishes Full Code.     Will start on levaquin    1/27  D/c today  Completed 3 day course of Remdesivir. Marked improvement in cough and shortness of breath  2 more days of oral levofloxacin at d/c to complete 5 days course  Follow up with PCP in a week    Viral bronchitis  Viral bronchitis is a differential given her history of Cough and shortness of breath since past 6 weeks. Negative Covid status in these past few weeks as per patient. Covid positive today  Has completed course of Z-Chandra, doxycycline and Augmentin, Rocephin shots, Medrol dose pack. Currently on Levofloxacin Day 2  Viral bronchitis likely secondary to recent upper respiratory viral infection  Suggestive symptoms: cough(non-productive), chest discomfort, fatigue  No high grade fever or purulent sputum  Conservative treatment for now  Duo-nebs  inhalation treatment, adequate hydration and monitor to prevent secondary bacterial infection          GAMAL (obstructive sleep apnea)  History of sleep apnea, likely due to morbid obesity  Patient was recommended home CPAP  Not using currently since she does not have the machine at home  Continue CPAP at hospital during naps/ nightly    1/27  D/c today  Continue home CPAP  Follow up with PCP in a week    Severe obesity (BMI >= 40)  Body mass index is 53.41 kg/m².   Morbid obesity complicates all aspects of disease management from diagnostic modalities to treatment.   Weight loss encouraged and health benefits explained to patient.         Hypokalemia  Patient's most recent potassium results are listed below.   Recent Labs     01/25/25  0519 01/26/25  0534 01/27/25  0555   K 2.9* 3.3* 3.5       Plan  - Replete potassium per protocol.   - Monitor potassium Daily  - Patient's hypokalemia is stable  -Oral KCL is supplemented. Will adjust dose as needed    1/27  Resolved    Epilepsy  Continue with home Carbamazepine    Patient is compliant with her meds      Final Active Diagnoses:    Diagnosis Date Noted POA    PRINCIPAL PROBLEM:  Pneumonia due to COVID-19 virus [U07.1, J12.82] 01/25/2025 Yes    Viral bronchitis [J20.8] 01/25/2025 Yes    GAMAL (obstructive sleep apnea) [G47.33] 12/07/2021 Yes     Chronic    Severe obesity (BMI >= 40) [E66.01] 12/07/2021 Yes     Chronic    Hypokalemia [E87.6] 01/25/2025 Yes    Epilepsy [G40.909] 12/07/2021 Yes     Chronic    Anemia [D64.9] 01/25/2025 Unknown    Morbid obesity [E66.01] 01/25/2025 Yes    History of total bilateral knee replacement (TKR) [Z96.653] 06/09/2022 Not Applicable      Problems Resolved During this Admission:    Diagnosis Date Noted Date Resolved POA    Primary osteoarthritis of both knees [M17.0] 10/04/2021 01/25/2025 Yes     Chronic       Discharged Condition: stable    Disposition: Home or Self Care    Follow Up:   Follow-up Information       Diallo Sutton II,  MD. Schedule an appointment as soon as possible for a visit in 1 week(s).    Specialty: Family Medicine  Why: Hospital discharge follow up  Contact information:  [address] [facility]  [facility]  [place] [address]  [phone]                           Patient Instructions:      Diet Adult Regular     Notify your health care provider if you experience any of the following:  temperature >100.4     Notify your health care provider if you experience any of the following:  increased confusion or weakness     Notify your health care provider if you experience any of the following:  difficulty breathing or increased cough     Activity as tolerated       Significant Diagnostic Studies: N/A    Pending Diagnostic Studies:       None           Medications:  Reconciled Home Medications:      Medication List        START taking these medications      levoFLOXacin 750 MG tablet  Commonly known as: LEVAQUIN  Take 1 tablet (750 mg total) by mouth once daily. for 2 days  Start taking on: January 28, 2025            CONTINUE taking these medications      baclofen 10 MG tablet  Commonly known as: LIORESAL  Take 10 mg by mouth 3 (three) times daily.     carBAMazepine 100 MG 12 hr tablet  Commonly known as: TEGRETOL XR  Take 5 tablets (500 mg total) by mouth 3 (three) times daily.     clobetasoL 0.05 % external solution  Commonly known as: TEMOVATE  Apply to scalp  BID tapering with improvement     cyanocobalamin 1000 MCG tablet  Commonly known as: VITAMIN B-12  Take 2,000 mcg by mouth once daily.     dicyclomine 10 MG capsule  Commonly known as: BENTYL  Take 1 capsule (10 mg total) by mouth 3 (three) times daily as needed (abdominal pain).     EnbreL SureClick 50 mg/mL (1 mL) Pnij  Generic drug: etanercept  Inject 50 mg into the skin every 7 days.     EScitalopram oxalate 20 MG tablet  Commonly known as: LEXAPRO  Take 20 mg by mouth once daily.     febuxostat 40 mg Tab  Commonly known as: ULORIC  Take 40 mg  by mouth once daily.     furosemide 20 MG tablet  Commonly known as: LASIX  Take 20 mg by mouth as needed.     ibuprofen 800 MG tablet  Commonly known as: ADVIL,MOTRIN  Take 800 mg by mouth every 6 (six) hours as needed for Pain.     ketoconazole 2 % shampoo  Commonly known as: NIZORAL  Use as a scalp treatment 2-3 times a week for maintenance, massaging into scalp and leaving on for up to 3 minutes before rinsing     oxyCODONE-acetaminophen  mg per tablet  Commonly known as: PERCOCET  Take 1 tablet by mouth every 6 (six) hours as needed.     phentermine 37.5 mg tablet  Commonly known as: ADIPEX-P  Take 37.5 mg by mouth once daily.     potassium chloride SA 20 MEQ tablet  Commonly known as: K-DUR,KLOR-CON  Take 20 mEq by mouth 2 (two) times daily.     predniSONE 10 MG tablet  Commonly known as: DELTASONE  Take 10 mg by mouth as needed.     VENTOLIN HFA 90 mcg/actuation inhaler  Generic drug: albuterol     zafirlukast 10 MG Tab  Commonly known as: ACCOLATE  Take 10 mg by mouth once daily.            STOP taking these medications      fluconazole 100 MG tablet  Commonly known as: DIFLUCAN              Indwelling Lines/Drains at time of discharge:   Lines/Drains/Airways       None                   Time spent on the discharge of patient: 45 minutes         Vladimir Mccracken MD  Department of Hospital Medicine  [facility]

## 2025-01-27 NOTE — PROGRESS NOTES
[facility]  Wound Care    Patient Name:  Marian Foster   MRN:  [id number]  Date: 1/27/2025  Diagnosis: Pneumonia due to COVID-19 virus    History:     Past Medical History:   Diagnosis Date    Arthritis     COPD (chronic obstructive pulmonary disease)     Disorder of kidney and ureter     Hypertension     Liver disease     Osteoarthritis     Seizures     Sleep apnea        Social History     Socioeconomic History    Marital status:    Tobacco Use    Smoking status: Former    Smokeless tobacco: Never   Substance and Sexual Activity    Alcohol use: Not Currently    Drug use: Not Currently     Social Drivers of Health     Financial Resource Strain: Low Risk  (1/25/2025)    Overall Financial Resource Strain (CARDIA)     Difficulty of Paying Living Expenses: Not hard at all   Food Insecurity: No Food Insecurity (1/25/2025)    Hunger Vital Sign     Worried About Running Out of Food in the Last Year: Never true     Ran Out of Food in the Last Year: Never true   Transportation Needs: No Transportation Needs (1/25/2025)    TRANSPORTATION NEEDS     Transportation : No   Stress: No Stress Concern Present (1/25/2025)    [employer] - Occupational Stress Questionnaire     Feeling of Stress : Not at all   Housing Stability: Low Risk  (1/25/2025)    Housing Stability Vital Sign     Unable to Pay for Housing in the Last Year: No     Homeless in the Last Year: No       Precautions:     Allergies as of 01/25/2025 - Reviewed 01/25/2025   Allergen Reaction Noted    Fluoxetine Other (See Comments) 01/13/2021    Meperidine Other (See Comments) 01/13/2021    Tetracyclines Other (See Comments) 04/13/2021    Adhesive tape-silicones Rash 04/13/2021    Erythromycin  01/13/2021    Celecoxib  02/02/2021    Cyclobenzaprine  07/26/2023    Adhesive Rash 08/25/2022       WO Assessment Details/Treatment     Narrative: Seen patient for initiation of preventative skin care measures    Patient  in bed, Alert. States skin is ok.   D/c home today  García score 22        01/27/2025

## 2025-01-27 NOTE — ASSESSMENT & PLAN NOTE
History of sleep apnea, likely due to morbid obesity  Patient was recommended home CPAP  Not using currently since she does not have the machine at home  Continue CPAP at hospital during naps/ nightly    1/27  D/c today  Continue home CPAP  Follow up with PCP in a week

## 2025-01-27 NOTE — ASSESSMENT & PLAN NOTE
Patient's most recent potassium results are listed below.   Recent Labs     01/25/25  0519 01/26/25  0534 01/27/25  0555   K 2.9* 3.3* 3.5       Plan  - Replete potassium per protocol.   - Monitor potassium Daily  - Patient's hypokalemia is stable  -Oral KCL is supplemented. Will adjust dose as needed    1/27  Resolved

## 2025-01-27 NOTE — ASSESSMENT & PLAN NOTE
Patient is identified as High risk for severe complications of COVID 19 based on COVID risk score of 2   Initiate standard COVID protocols; COVID-19 testing ,Infection Control notification  and isolation- respiratory, contact and droplet per protocol    Diagnostics: CBC, CMP, CRP, and Portable CXR, Pro calcitonin and CT chest    Management: Initiate targeted therapy with Remdesivir, 200mg IV x1, followed by 100mg IV daily x3 days total.   Maintain oxygen saturations 92-96% via Nasal Cannula  LPM and monitor with continuous/intermittent pulse oximetry. , and Inhaled bronchodilators as needed for shortness of breath.    Advance Care Planning  Current advance care plan has been discussed with patient and patient currently wishes Full Code.     Will start on levaquin    1/27  D/c today  Completed 3 day course of Remdesivir. Marked improvement in cough and shortness of breath  2 more days of oral levofloxacin at d/c to complete 5 days course  Follow up with PCP in a week

## 2025-01-27 NOTE — NURSING
Sitting up at bedside.  Skin warm et dry.  Resp even et unlabored.  Denies pain or discomfort at this time.  Discharge instructions reviewed with pt and copy provided in writing.  No add'l questions or concerns at this time.  Awaiting transport for d/c home via private vehicle.  No s/s of acute distress noted.  Safety measures in place et maintained.

## 2025-01-30 LAB
BACTERIA BLD CULT: NORMAL
BACTERIA BLD CULT: NORMAL

## 2025-02-07 RX ORDER — AMLODIPINE BESYLATE 5 MG/1
5 TABLET ORAL DAILY
COMMUNITY
Start: 2024-12-02

## 2025-02-07 RX ORDER — CLINDAMYCIN HYDROCHLORIDE 300 MG/1
300 CAPSULE ORAL 2 TIMES DAILY
COMMUNITY
Start: 2025-01-24

## 2025-02-07 RX ORDER — LEVOFLOXACIN 750 MG/1
TABLET ORAL
COMMUNITY

## 2025-02-07 RX ORDER — FLUCONAZOLE 100 MG/1
TABLET ORAL
COMMUNITY

## 2025-02-12 ENCOUNTER — OFFICE VISIT (OUTPATIENT)
Dept: PULMONOLOGY | Facility: CLINIC | Age: 55
End: 2025-02-12
Payer: MEDICARE

## 2025-02-12 ENCOUNTER — HOSPITAL ENCOUNTER (OUTPATIENT)
Dept: RADIOLOGY | Facility: HOSPITAL | Age: 55
Discharge: HOME | End: 2025-02-12
Attending: INTERNAL MEDICINE
Payer: MEDICARE

## 2025-02-12 VITALS
OXYGEN SATURATION: 95 % | DIASTOLIC BLOOD PRESSURE: 70 MMHG | WEIGHT: 293 LBS | HEART RATE: 86 BPM | BODY MASS INDEX: 52.71 KG/M2 | SYSTOLIC BLOOD PRESSURE: 104 MMHG

## 2025-02-12 DIAGNOSIS — R06.02 SOB (SHORTNESS OF BREATH): ICD-10-CM

## 2025-02-12 DIAGNOSIS — G47.33 OSA (OBSTRUCTIVE SLEEP APNEA): ICD-10-CM

## 2025-02-12 DIAGNOSIS — R06.02 SHORTNESS OF BREATH: ICD-10-CM

## 2025-02-12 DIAGNOSIS — R06.02 SOB (SHORTNESS OF BREATH): Primary | ICD-10-CM

## 2025-02-12 PROCEDURE — 1159F MED LIST DOCD IN RCRD: CPT | Mod: CPTII,,, | Performed by: INTERNAL MEDICINE

## 2025-02-12 PROCEDURE — 1111F DSCHRG MED/CURRENT MED MERGE: CPT | Mod: CPTII,,, | Performed by: INTERNAL MEDICINE

## 2025-02-12 PROCEDURE — 99215 OFFICE O/P EST HI 40 MIN: CPT | Mod: PBBFAC,25 | Performed by: INTERNAL MEDICINE

## 2025-02-12 PROCEDURE — 71046 X-RAY EXAM CHEST 2 VIEWS: CPT | Mod: 26,,, | Performed by: RADIOLOGY

## 2025-02-12 PROCEDURE — 99999 PR PBB SHADOW E&M-EST. PATIENT-LVL V: CPT | Mod: PBBFAC,,, | Performed by: INTERNAL MEDICINE

## 2025-02-12 PROCEDURE — 71046 X-RAY EXAM CHEST 2 VIEWS: CPT | Mod: TC

## 2025-02-12 PROCEDURE — 3008F BODY MASS INDEX DOCD: CPT | Mod: CPTII,,, | Performed by: INTERNAL MEDICINE

## 2025-02-12 PROCEDURE — 99203 OFFICE O/P NEW LOW 30 MIN: CPT | Mod: S$PBB,,, | Performed by: INTERNAL MEDICINE

## 2025-02-12 PROCEDURE — 3078F DIAST BP <80 MM HG: CPT | Mod: CPTII,,, | Performed by: INTERNAL MEDICINE

## 2025-02-12 PROCEDURE — 3074F SYST BP LT 130 MM HG: CPT | Mod: CPTII,,, | Performed by: INTERNAL MEDICINE

## 2025-02-12 NOTE — PROGRESS NOTES
Subjective:       Patient ID: Marian Foster is a 54 y.o. female.    Chief Complaint: Shortness of Breath (RF by Dr. Sutton, stated that she use to see Dr. Mata and having a lot of pulmonary problems, also recovering covid and pnuemonia. Stated she use to have C-pap through [facility], would like a new one.)    Shortness of Breath  This is a chronic problem. The current episode started more than 1 month ago. The problem has been unchanged. Pertinent negatives include no abdominal pain, chest pain, ear pain, headaches or rash. The symptoms are aggravated by exercise.     Past Medical History:   Diagnosis Date    Arthritis     COPD (chronic obstructive pulmonary disease)     Disorder of kidney and ureter     Hypertension     Liver disease     Osteoarthritis     Seizures     Sleep apnea      Past Surgical History:   Procedure Laterality Date    ABDOMINAL SURGERY      x3    APPENDECTOMY      BREAST SURGERY      CHOLECYSTECTOMY      GANGLION CYST EXCISION Left     HYSTERECTOMY      KNEE ARTHROSCOPY      LAPAROSCOPY      lithrotripsy      MYRINGOTOMY W/ TUBES Left     OOPHORECTOMY      TONSILLECTOMY      TOTAL REDUCTION MAMMOPLASTY      TRIGGER FINGER RELEASE Right      Family History   Problem Relation Name Age of Onset    Breast cancer Sister      Breast cancer Paternal Grandmother       Review of patient's allergies indicates:   Allergen Reactions    Fluoxetine Other (See Comments)     Other reaction(s): Unknown  Suicidal    Other reaction(s): Unknown  Suicidal    Other reaction(s): Unknown Suicidal    Meperidine Other (See Comments)     Other reaction(s): Unknown  Seizures    Other reaction(s): Unknown  Seizures    Other reaction(s): Unknown Seizures    Tetracyclines Other (See Comments)     Other reaction(s): Unknown  Childhood unknown allergy    Other reaction(s): Unknown  Childhood unknown allergy    Other reaction(s): Unknown Childhood unknown allergy    Adhesive tape-silicones Rash    Erythromycin      Other  reaction(s): Unknown  Other reaction(s): Unknown  Other reaction(s): Unknown    Celecoxib      Other reaction(s): Unknown  Other reaction(s): Unknown  Other reaction(s): Unknown    Cyclobenzaprine      depression    Adhesive Rash     Other reaction(s): Unknown  Note: plastic tape      Social History     Tobacco Use    Smoking status: Former    Smokeless tobacco: Never    Tobacco comments:     Use to smoke- cigars, quit about 10 years ago    Substance Use Topics    Alcohol use: Not Currently    Drug use: Not Currently      Review of Systems   Constitutional:  Negative for chills, activity change and night sweats.   HENT:  Negative for congestion and ear pain.    Eyes:  Negative for redness and itching.   Respiratory:  Positive for shortness of breath.    Cardiovascular:  Negative for chest pain and palpitations.   Musculoskeletal:  Negative for arthralgias and back pain.   Skin:  Negative for rash.   Gastrointestinal:  Negative for abdominal pain and abdominal distention.   Neurological:  Negative for dizziness and headaches.   Hematological:  Negative for adenopathy. Does not bruise/bleed easily.   Psychiatric/Behavioral:  Negative for confusion. The patient is not nervous/anxious.        Objective:      Physical Exam   Constitutional: She is oriented to person, place, and time. She appears well-developed and well-nourished.   HENT:   Head: Normocephalic.   Nose: Nose normal.   Mouth/Throat: Oropharynx is clear and moist.   Neck: No JVD present. No thyromegaly present.   Cardiovascular: Normal rate, regular rhythm, normal heart sounds and intact distal pulses.   Pulmonary/Chest: Normal expansion, hyperinflation, symmetric chest wall expansion, effort normal and breath sounds normal.   Abdominal: Soft. Bowel sounds are normal.   Musculoskeletal:         General: Normal range of motion.      Cervical back: Normal range of motion and neck supple.   Lymphadenopathy: No supraclavicular adenopathy is present.     She has  no cervical adenopathy.   Neurological: She is alert and oriented to person, place, and time. She has normal reflexes.   Skin: Skin is warm and dry.   Psychiatric: She has a normal mood and affect. Her behavior is normal.     Personal Diagnostic Review  none pertinent        2/12/2025     1:07 PM 1/27/2025    12:01 PM 1/27/2025     7:55 AM 1/27/2025     7:45 AM 1/27/2025     4:47 AM 1/27/2025    12:17 AM 1/26/2025    11:00 PM   Pulmonary Function Tests   SpO2 95 % 96 % 98 % 97 % 98 % 98 % 99 %   Weight 148.1 kg (326 lb 9.6 oz)               Assessment:       1. SOB (shortness of breath)    2. GAMAL (obstructive sleep apnea)    3. Shortness of breath        Outpatient Encounter Medications as of 2/12/2025   Medication Sig Dispense Refill    amLODIPine (NORVASC) 5 MG tablet Take 5 mg by mouth once daily.      clindamycin (CLEOCIN) 300 MG capsule Take 300 mg by mouth 2 (two) times daily.      baclofen (LIORESAL) 10 MG tablet Take 10 mg by mouth 3 (three) times daily.      carBAMazepine (TEGRETOL XR) 100 MG 12 hr tablet Take 5 tablets (500 mg total) by mouth 3 (three) times daily. 540 tablet 3    clobetasoL (TEMOVATE) 0.05 % external solution Apply to scalp  BID tapering with improvement 50 mL 5    cyanocobalamin (VITAMIN B-12) 1000 MCG tablet Take 2,000 mcg by mouth once daily.      dicyclomine (BENTYL) 10 MG capsule Take 1 capsule (10 mg total) by mouth 3 (three) times daily as needed (abdominal pain). 120 capsule 0    ENBREL SURECLICK 50 mg/mL (1 mL) PnIj Inject 50 mg into the skin every 7 days.      EScitalopram oxalate (LEXAPRO) 20 MG tablet Take 20 mg by mouth once daily.      febuxostat (ULORIC) 40 mg Tab Take 40 mg by mouth once daily.      fluconazole (DIFLUCAN) 100 MG tablet       furosemide (LASIX) 20 MG tablet Take 20 mg by mouth as needed.      ibuprofen (ADVIL,MOTRIN) 800 MG tablet Take 800 mg by mouth every 6 (six) hours as needed for Pain.      ketoconazole (NIZORAL) 2 % shampoo Use as a scalp treatment  2-3 times a week for maintenance, massaging into scalp and leaving on for up to 3 minutes before rinsing 120 mL 4    [] levoFLOXacin (LEVAQUIN) 750 MG tablet Take 1 tablet (750 mg total) by mouth once daily. for 2 days 2 tablet 0    levoFLOXacin (LEVAQUIN) 750 MG tablet       oxyCODONE-acetaminophen (PERCOCET)  mg per tablet Take 1 tablet by mouth every 6 (six) hours as needed.      phentermine (ADIPEX-P) 37.5 mg tablet Take 37.5 mg by mouth once daily.      potassium chloride SA (K-DUR,KLOR-CON) 20 MEQ tablet Take 20 mEq by mouth 2 (two) times daily.      predniSONE (DELTASONE) 10 MG tablet Take 10 mg by mouth as needed.      VENTOLIN HFA 90 mcg/actuation inhaler       zafirlukast (ACCOLATE) 10 MG Tab Take 10 mg by mouth once daily.      [DISCONTINUED] cholecalciferol, vitamin D3, 1,250 mcg (50,000 unit) capsule Vitamin D3 1.25 MG (72010 UT) Oral Capsule QTY: 12 capsule Days: 30 Refills: 3  Written: 21 Patient Instructions: 1 capsule weekly x 12 weeks      [DISCONTINUED] clotrimazole-betamethasone 1-0.05% (LOTRISONE) cream       [DISCONTINUED] ergocalciferol (ERGOCALCIFEROL) 50,000 unit Cap Take 50,000 Units by mouth every 7 days.      [DISCONTINUED] etanercept (ENBREL) 25 mg (1 mL) SolR injection Inject 25 mg into the skin once a week.      [DISCONTINUED] fluconazole (DIFLUCAN) 100 MG tablet Take 100 mg by mouth as needed.      [DISCONTINUED] magnesium 200 mg Tab Magnesium 400 MG Oral Tablet QTY: 30 tablet Days: 30 Refills: 5  Written: 10/17/22 Patient Instructions: Take one po qd      [DISCONTINUED] mupirocin (BACTROBAN) 2 % ointment SMARTSI Application Topical 2-3 Times Daily      [DISCONTINUED] nabumetone (RELAFEN) 500 MG tablet Take 500 mg by mouth once daily. (Patient not taking: Reported on 10/21/2024)      [DISCONTINUED] promethazine (PHENERGAN) 25 MG tablet Take 25 mg by mouth as needed.      [DISCONTINUED] triamcinolone acetonide 0.025% (KENALOG) 0.025 % cream Apply to AA on body BID  PRN flares tapering with improvement 80 g 6    [] remdesivir 100 mg in 0.9% NaCl 250 mL infusion       [DISCONTINUED] acetaminophen tablet 650 mg       [DISCONTINUED] acetaminophen tablet 650 mg       [DISCONTINUED] carBAMazepine 12 hr tablet 100 mg       [DISCONTINUED] carBAMazepine 12 hr tablet 200 mg       [DISCONTINUED] carBAMazepine 12 hr tablet 500 mg       [DISCONTINUED] dextrose 50% injection 12.5 g       [DISCONTINUED] dextrose 50% injection 25 g       [DISCONTINUED] enoxaparin injection 40 mg       [DISCONTINUED] EScitalopram oxalate tablet 20 mg       [DISCONTINUED] glucagon (human recombinant) injection 1 mg       [DISCONTINUED] glucose chewable tablet 16 g       [DISCONTINUED] glucose chewable tablet 24 g       [DISCONTINUED] HYDROcodone-acetaminophen  mg per tablet 1 tablet       [DISCONTINUED] levoFLOXacin tablet 750 mg       [DISCONTINUED] melatonin tablet 6 mg       [DISCONTINUED] montelukast tablet 10 mg       [DISCONTINUED] naloxone 0.4 mg/mL injection 0.02 mg       [DISCONTINUED] ondansetron injection 4 mg       [DISCONTINUED] polyethylene glycol packet 17 g       [DISCONTINUED] potassium chloride SA CR tablet 20 mEq       [DISCONTINUED] prochlorperazine injection Soln 5 mg       [DISCONTINUED] simethicone chewable tablet 80 mg       [DISCONTINUED] sodium chloride 0.9% flush 10 mL        No facility-administered encounter medications on file as of 2025.     Orders Placed This Encounter   Procedures    Arterial blood gas     Standing Status:   Future     Standing Expiration Date:   2026    X-Ray Chest PA And Lateral     Standing Status:   Future     Number of Occurrences:   1     Standing Expiration Date:   2026     Order Specific Question:   May the Radiologist modify the order per protocol to meet the clinical needs of the patient?     Answer:   Yes    Ambulatory referral/consult to Sleep Disorders     Standing Status:   Future     Standing Expiration Date:    3/12/2026     Referral Priority:   Routine     Referral Type:   Consultation     Requested Specialty:   Sleep Medicine     Number of Visits Requested:   1    Complete PFT with bronchodilator     Standing Status:   Future     Standing Expiration Date:   2/12/2026     Order Specific Question:   Release to patient     Answer:   Immediate    Stress test, pulmonary     Standing Status:   Future     Standing Expiration Date:   2/12/2026     Order Specific Question:   Reason for study     Answer:   Functional status     Order Specific Question:   Release to patient     Answer:   Immediate       Plan:       Problem List Items Addressed This Visit          Pulmonary    Shortness of breath     Patient has not been here to see me in over 3 years she is no longer using any inhalers but has not had a lot of difficulties with breathing has been a little bit worse lately she has had COVID recently my recommendation is that she probably has a obesity hypoventilation syndrome we need to restudy were going to do arterial blood gases PFTs 6 minute walk test chest x-ray continue on Ventolin as a p.r.n. drug she needs a sleep study to get back on CPAP            Other    GAMAL (obstructive sleep apnea) (Chronic)    Relevant Orders    Ambulatory referral/consult to Sleep Disorders     Other Visit Diagnoses       SOB (shortness of breath)    -  Primary    Relevant Orders    X-Ray Chest PA And Lateral (Completed)    Complete PFT with bronchodilator    Stress test, pulmonary    Arterial blood gas

## 2025-02-12 NOTE — ASSESSMENT & PLAN NOTE
Patient has not been here to see me in over 3 years she is no longer using any inhalers but has not had a lot of difficulties with breathing has been a little bit worse lately she has had COVID recently my recommendation is that she probably has a obesity hypoventilation syndrome we need to restudy were going to do arterial blood gases PFTs 6 minute walk test chest x-ray continue on Ventolin as a p.r.n. drug she needs a sleep study to get back on CPAP

## 2025-04-14 ENCOUNTER — CLINICAL SUPPORT (OUTPATIENT)
Dept: PULMONOLOGY | Facility: HOSPITAL | Age: 55
End: 2025-04-14
Attending: INTERNAL MEDICINE
Payer: MEDICARE

## 2025-04-14 ENCOUNTER — OFFICE VISIT (OUTPATIENT)
Dept: PULMONOLOGY | Facility: CLINIC | Age: 55
End: 2025-04-14
Payer: MEDICARE

## 2025-04-14 VITALS
RESPIRATION RATE: 18 BRPM | DIASTOLIC BLOOD PRESSURE: 74 MMHG | WEIGHT: 293 LBS | SYSTOLIC BLOOD PRESSURE: 136 MMHG | BODY MASS INDEX: 47.09 KG/M2 | HEART RATE: 76 BPM | HEIGHT: 66 IN | OXYGEN SATURATION: 95 %

## 2025-04-14 VITALS — HEIGHT: 66 IN | WEIGHT: 293 LBS | BODY MASS INDEX: 47.09 KG/M2 | OXYGEN SATURATION: 96 %

## 2025-04-14 DIAGNOSIS — J44.9 CHRONIC OBSTRUCTIVE PULMONARY DISEASE, UNSPECIFIED COPD TYPE: Primary | ICD-10-CM

## 2025-04-14 DIAGNOSIS — R06.02 SOB (SHORTNESS OF BREATH): ICD-10-CM

## 2025-04-14 LAB
HCO3 UR-SCNC: 29.9 MMOL/L (ref 21–28)
PCO2 BLDA: 45 MMHG (ref 35–48)
PH SMN: 7.43 [PH] (ref 7.35–7.45)
PO2 BLDA: 62 MMHG (ref 83–108)
POC BASE EXCESS: 4.8 MMOL/L (ref -2–3)
POC SATURATED O2: 92 % (ref 95–98)

## 2025-04-14 PROCEDURE — 94727 GAS DIL/WSHOT DETER LNG VOL: CPT

## 2025-04-14 PROCEDURE — 99999 PR PBB SHADOW E&M-EST. PATIENT-LVL V: CPT | Mod: PBBFAC,,, | Performed by: INTERNAL MEDICINE

## 2025-04-14 PROCEDURE — 99215 OFFICE O/P EST HI 40 MIN: CPT | Mod: PBBFAC,25 | Performed by: INTERNAL MEDICINE

## 2025-04-14 PROCEDURE — 94618 PULMONARY STRESS TESTING: CPT

## 2025-04-14 PROCEDURE — 3075F SYST BP GE 130 - 139MM HG: CPT | Mod: CPTII,,, | Performed by: INTERNAL MEDICINE

## 2025-04-14 PROCEDURE — 3008F BODY MASS INDEX DOCD: CPT | Mod: CPTII,,, | Performed by: INTERNAL MEDICINE

## 2025-04-14 PROCEDURE — 94060 EVALUATION OF WHEEZING: CPT

## 2025-04-14 PROCEDURE — 1159F MED LIST DOCD IN RCRD: CPT | Mod: CPTII,,, | Performed by: INTERNAL MEDICINE

## 2025-04-14 PROCEDURE — 94729 DIFFUSING CAPACITY: CPT

## 2025-04-14 PROCEDURE — 27100098 HC SPACER

## 2025-04-14 PROCEDURE — 36600 WITHDRAWAL OF ARTERIAL BLOOD: CPT

## 2025-04-14 PROCEDURE — 99213 OFFICE O/P EST LOW 20 MIN: CPT | Mod: S$PBB,,, | Performed by: INTERNAL MEDICINE

## 2025-04-14 PROCEDURE — 3078F DIAST BP <80 MM HG: CPT | Mod: CPTII,,, | Performed by: INTERNAL MEDICINE

## 2025-04-14 RX ORDER — FLUTICASONE PROPIONATE AND SALMETEROL 250; 50 UG/1; UG/1
1 POWDER RESPIRATORY (INHALATION) 2 TIMES DAILY
Qty: 180 EACH | Refills: 0 | Status: SHIPPED | OUTPATIENT
Start: 2025-04-14 | End: 2025-07-13

## 2025-04-14 NOTE — PROCEDURES
Pulmonary function test   Forced vital capacity is 3.89 L or 108% predicted   FEV1 3.32 L or 170 team% predicted FEV1 ratio 85%  Normal lung volumes   Normal DLCO   Normal pulmonary function tests with small airways broncho reactivity

## 2025-04-14 NOTE — PROGRESS NOTES
Subjective:       Patient ID: Marian Foster is a 54 y.o. female.    Chief Complaint: Shortness of Breath (PFT completed today )    History of Present Illness    HPI:  Ms. Foster presents with chest pressure and shortness of breath. Ms. Foster reports pressure in the center of her chest, initially attributed to heartburn. She attempted to treat it with Nexium and mustard, but neither provided relief. Ms. Foster also complains of shortness of breath, occurring with prolonged walking or physical activities. She uses a dual nebulizer containing albuterol and ipratropium 3 times daily, as well as a Pro Air (albuterol) inhaler. Ms. Foster reports expectorating thick white sputum at times and indicates some level of functional limitation in performing desired activities. She uses oxygen at home during sleep and reports improved sleep quality when using CPAP.    Ms. Foster denies fever.    MEDICATIONS:  Ms. Foster is on Combivent Respimat (albuterol/ipratropium) inhaler 3 times daily and Pro Air (albuterol) inhaler for respiratory issues. She is also on Embrel for arthritis. She uses oxygen during sleep for respiratory support.    MEDICAL HISTORY:  Ms. Foster has a history of reflux, arthritis, and sleep apnea.    TEST RESULTS:  Ms. Foster's arterial blood gases show a slightly low oxygen level at 62, with normal PCO2 and pH. Her pulmonary function tests reveal an FEV1 of 3.32 L, indicating normal results with slight bronchoreactivity.      ROS:  General: -fever, -chills, -fatigue, -weight gain, -weight loss  Eyes: -vision changes, -redness, -discharge  ENT: -ear pain, -nasal congestion, -sore throat  Cardiovascular: -chest pain, -palpitations, -lower extremity edema, +chest pressure  Respiratory: -cough, -shortness of breath, +exertional dyspnea, +productive cough  Gastrointestinal: -abdominal pain, -nausea, -vomiting, -diarrhea, -constipation, -blood in stool, +indigestion  Genitourinary: -dysuria, -hematuria,  "-frequency  Musculoskeletal: -joint pain, -muscle pain  Skin: -rash, -lesion  Neurological: -headache, -dizziness, -numbness, -tingling  Psychiatric: -anxiety, -depression, -sleep difficulty          Objective:      Physical Exam  Personal Diagnostic Review  PFTs look normal except for broncho reactivity now        4/14/2025    10:56 AM 2/12/2025     1:07 PM 1/27/2025    12:01 PM 1/27/2025     7:55 AM 1/27/2025     7:45 AM 1/27/2025     4:47 AM 1/27/2025    12:17 AM   Pulmonary Function Tests   SpO2 95 % 95 % 96 % 98 % 97 % 98 % 98 %   Height 5' 6" (1.676 m)         Weight 147.9 kg (326 lb) 148.1 kg (326 lb 9.6 oz)        BMI (Calculated) 52.6               Assessment:       1. Chronic obstructive pulmonary disease, unspecified COPD type        Encounter Medications[1]  No orders of the defined types were placed in this encounter.      Plan:       Problem List Items Addressed This Visit          Pulmonary    COPD (chronic obstructive pulmonary disease) - Primary         Assessment & Plan    IMPRESSION:  - Arterial blood gases and pulmonary functions normal, with no signs of respiratory failure.  - FEV1 at 3.32 L, indicating good lung function.  - Considered adding an inhaled steroid to improve symptoms and functionality.  - Evaluated current medication regimen, identifying need for adjustment.  - Recommend increasing oxygen therapy duration to at least 14 hours daily for potential lung benefit.    - Started Wixela inhaler.  - Continued Combivent Respimat (red inhaler, albuterol + ipratropium) 3 times daily.  - Follow up in 6 months.            This note was generated with the assistance of ambient listening technology. Verbal consent was obtained by the patient and accompanying visitor(s) for the recording of patient appointment to facilitate this note. I attest to having reviewed and edited the generated note for accuracy, though some syntax or spelling errors may persist. Please contact the author of this note for " any clarification.                   [1]   Outpatient Encounter Medications as of 4/14/2025   Medication Sig Dispense Refill    amLODIPine (NORVASC) 5 MG tablet Take 5 mg by mouth once daily.      baclofen (LIORESAL) 10 MG tablet Take 10 mg by mouth 3 (three) times daily.      carBAMazepine (TEGRETOL XR) 100 MG 12 hr tablet Take 5 tablets (500 mg total) by mouth 3 (three) times daily. 540 tablet 3    clobetasoL (TEMOVATE) 0.05 % external solution Apply to scalp  BID tapering with improvement 50 mL 5    cyanocobalamin (VITAMIN B-12) 1000 MCG tablet Take 2,000 mcg by mouth once daily.      dicyclomine (BENTYL) 10 MG capsule Take 1 capsule (10 mg total) by mouth 3 (three) times daily as needed (abdominal pain). 120 capsule 0    ENBREL SURECLICK 50 mg/mL (1 mL) PnIj Inject 50 mg into the skin every 7 days.      EScitalopram oxalate (LEXAPRO) 20 MG tablet Take 20 mg by mouth once daily.      febuxostat (ULORIC) 40 mg Tab Take 40 mg by mouth once daily.      fluconazole (DIFLUCAN) 100 MG tablet       furosemide (LASIX) 20 MG tablet Take 20 mg by mouth as needed.      ibuprofen (ADVIL,MOTRIN) 800 MG tablet Take 800 mg by mouth every 6 (six) hours as needed for Pain.      ketoconazole (NIZORAL) 2 % shampoo Use as a scalp treatment 2-3 times a week for maintenance, massaging into scalp and leaving on for up to 3 minutes before rinsing 120 mL 4    levoFLOXacin (LEVAQUIN) 750 MG tablet       oxyCODONE-acetaminophen (PERCOCET)  mg per tablet Take 1 tablet by mouth every 6 (six) hours as needed.      phentermine (ADIPEX-P) 37.5 mg tablet Take 37.5 mg by mouth once daily.      potassium chloride SA (K-DUR,KLOR-CON) 20 MEQ tablet Take 20 mEq by mouth 2 (two) times daily.      predniSONE (DELTASONE) 10 MG tablet Take 10 mg by mouth as needed.      VENTOLIN HFA 90 mcg/actuation inhaler       zafirlukast (ACCOLATE) 10 MG Tab Take 10 mg by mouth once daily.      fluticasone-salmeterol diskus inhaler 250-50 mcg Inhale 1 puff into  the lungs 2 (two) times daily. Controller 180 each 0    [DISCONTINUED] clindamycin (CLEOCIN) 300 MG capsule Take 300 mg by mouth 2 (two) times daily.       No facility-administered encounter medications on file as of 4/14/2025.

## 2025-04-14 NOTE — PROGRESS NOTES
ABG drawn from left radial. Positive Param's test. Pressure held five minutes. Tolerated well with no adverse reaction. D/C in good condition.

## 2025-04-14 NOTE — PROCEDURES
6 minute walk test   patient walked 1051 ft  Was no desaturations during the study no decompensation normal 6 minute walk test

## 2025-07-22 NOTE — ASSESSMENT & PLAN NOTE
- Orders per orthopedics specific to surgery   - SCD lower legs   - Anticoagulation  When ok Surgery   - PO Protonix daily for ulcer ppx   - Incentive spirometry hourly    - early ambulation  12/8/21  - Progressing well P/O day#1  - SS working on SWB placement.   [General Appearance - Alert] : alert [General Appearance - In No Acute Distress] : in no acute distress [Sclera] : the sclera and conjunctiva were normal [PERRL With Normal Accommodation] : pupils were equal in size, round, reactive to light [Extraocular Movements] : extraocular movements were intact [Outer Ear] : the ears and nose were normal in appearance [Oropharynx] : the oropharynx was normal with no thrush [Neck Appearance] : the appearance of the neck was normal [Neck Cervical Mass (___cm)] : no neck mass was observed [Jugular Venous Distention Increased] : there was no jugular-venous distention [Thyroid Diffuse Enlargement] : the thyroid was not enlarged [Auscultation Breath Sounds / Voice Sounds] : lungs were clear to auscultation bilaterally [Heart Rate And Rhythm] : heart rate was normal and rhythm regular [Heart Sounds] : normal S1 and S2 [Heart Sounds Gallop] : no gallops [Murmurs] : no murmurs [Heart Sounds Pericardial Friction Rub] : no pericardial rub [Full Pulse] : the pedal pulses are present [Edema] : there was no peripheral edema [Bowel Sounds] : normal bowel sounds [Abdomen Soft] : soft [Abdomen Tenderness] : non-tender [Abdomen Mass (___ Cm)] : no abdominal mass palpated [Costovertebral Angle Tenderness] : no CVA tenderness [No Palpable Adenopathy] : no palpable adenopathy [Musculoskeletal - Swelling] : no joint swelling [Nail Clubbing] : no clubbing  or cyanosis of the fingernails [Motor Tone] : muscle strength and tone were normal [Skin Color & Pigmentation] : normal skin color and pigmentation [] : no rash [Cranial Nerves] : cranial nerves 2-12 were intact [No Focal Deficits] : no focal deficits [FreeTextEntry1] : decreaed sensation of the feet. [Oriented To Time, Place, And Person] : oriented to person, place, and time [Affect] : the affect was normal

## (undated) DEVICE — BLADE SAGITTAL 21MM EDGE 90X1.27MM

## (undated) DEVICE — THERAPY COLD UNIT COMBO SHOULDER AND KNEE

## (undated) DEVICE — FILM IOBAN ANTIMICROBL 35X35CM

## (undated) DEVICE — GUIDEPIN 3.20MM 4 KANT SQUARE
Type: IMPLANTABLE DEVICE | Site: KNEE | Status: NON-FUNCTIONAL
Removed: 2022-05-19

## (undated) DEVICE — BOWL MIXER PRIZM

## (undated) DEVICE — SUTURE VICRYL 1 CP UD 27IN

## (undated) DEVICE — GLOVE SURGICAL PROTEXIS PI CLASSIC SIZE 7.5

## (undated) DEVICE — TOURNIQUET CUFF DISP QC 44 INCH

## (undated) DEVICE — GLOVE SURGICAL PROTEXIS PI BLUE SIZE 8.0

## (undated) DEVICE — IMMOBILIZER KNEE CUTAWAY 20IN

## (undated) DEVICE — BANDAGE ELASTIC 6 INCH X 5 YD STERILE

## (undated) DEVICE — STAPLER SKIN PROXIMATE PLUS MD 35 REG DISP

## (undated) DEVICE — SOL IRRIGATION SALINE 0.9% 1000ML BOTTLE

## (undated) DEVICE — SUTURE VICRYL 2-0 UNDYED CT-1 ANTI

## (undated) DEVICE — NAVIGATION KEY TOTAL KNEE FEE

## (undated) DEVICE — BATTERY NAVIGATION

## (undated) DEVICE — GLOVE SURGICAL PROTEXIS PI CLASSIC SIZE 6.5

## (undated) DEVICE — BLADE REAMER PATELLA SZ 46

## (undated) DEVICE — BLADE REAMER PATELLA SZ 42

## (undated) DEVICE — GLOVE SURGICAL PROTEXIS PI BLUE SIZE 7.5

## (undated) DEVICE — WOUND DRAINAGE KIT MEDIUM 10

## (undated) DEVICE — APPLICATOR CHLORAPREP HI-LITE TINTED ORANGE 26ML

## (undated) DEVICE — GOWN TOGA SZ LG (DR. P)

## (undated) DEVICE — GUIDEPIN 3.20MM 4 KANT SQUARE
Type: IMPLANTABLE DEVICE | Site: KNEE | Status: NON-FUNCTIONAL
Removed: 2021-12-07

## (undated) DEVICE — DRESSING AQUACEL A/G ADVANTAGE ANTIMICROBIAL 6 X 6IN

## (undated) DEVICE — CDS KNEE TOTAL

## (undated) DEVICE — GUIDESCREW 6 KANT HEXAGONAL 3.20MM
Type: IMPLANTABLE DEVICE | Site: KNEE | Status: NON-FUNCTIONAL
Removed: 2022-05-19

## (undated) DEVICE — PADDING CAST SPECIALIST SYNTHETIC 4IN X 4YD STERILE

## (undated) DEVICE — DRESSING KNEE COMPRESSION 24IN

## (undated) DEVICE — GUIDESCREW 6 KANT HEXAGONAL 3.20MM
Type: IMPLANTABLE DEVICE | Site: KNEE | Status: NON-FUNCTIONAL
Removed: 2021-12-07